# Patient Record
Sex: FEMALE | Race: WHITE | NOT HISPANIC OR LATINO | Employment: FULL TIME | URBAN - METROPOLITAN AREA
[De-identification: names, ages, dates, MRNs, and addresses within clinical notes are randomized per-mention and may not be internally consistent; named-entity substitution may affect disease eponyms.]

---

## 2017-01-25 RX ORDER — METFORMIN HYDROCHLORIDE 500 MG/1
500 TABLET ORAL 2 TIMES DAILY WITH MEALS
Qty: 60 TABLET | Refills: 0 | Status: SHIPPED | OUTPATIENT
Start: 2017-01-25 | End: 2017-02-27 | Stop reason: SDUPTHER

## 2017-02-24 DIAGNOSIS — E78.5 HYPERLIPIDEMIA LDL GOAL <100: ICD-10-CM

## 2017-02-24 RX ORDER — PRAVASTATIN SODIUM 20 MG/1
TABLET ORAL
Qty: 30 TABLET | Refills: 5 | OUTPATIENT
Start: 2017-02-24

## 2017-02-24 RX ORDER — METFORMIN HYDROCHLORIDE 500 MG/1
TABLET ORAL
Qty: 60 TABLET | Refills: 0 | OUTPATIENT
Start: 2017-02-24

## 2017-02-27 DIAGNOSIS — E78.5 HYPERLIPIDEMIA LDL GOAL <100: ICD-10-CM

## 2017-02-27 NOTE — TELEPHONE ENCOUNTER
----- Message from Jany Parker sent at 2/27/2017 10:22 AM CST -----  Contact: self  Patient [phone] (please only call the work # only)/do not call home number/ is requesting a refill on Metformin 500mg - takes one tablet twice daily and Pravastatin 20mg - takes one tab daily

## 2017-02-27 NOTE — TELEPHONE ENCOUNTER
----- Message from Jany Parker sent at 2/27/2017  2:30 PM CST -----  Contact: self  Patient [phone] is returning call to Nurse Macias/please call

## 2017-03-02 DIAGNOSIS — Z00.00 ANNUAL PHYSICAL EXAM: Primary | ICD-10-CM

## 2017-03-02 RX ORDER — METFORMIN HYDROCHLORIDE 500 MG/1
500 TABLET ORAL 2 TIMES DAILY WITH MEALS
Qty: 60 TABLET | Refills: 0 | Status: SHIPPED | OUTPATIENT
Start: 2017-03-02 | End: 2017-03-31 | Stop reason: SDUPTHER

## 2017-03-02 RX ORDER — PRAVASTATIN SODIUM 40 MG/1
40 TABLET ORAL DAILY
Qty: 30 TABLET | Refills: 0 | Status: SHIPPED | OUTPATIENT
Start: 2017-03-02 | End: 2017-03-31

## 2017-03-02 NOTE — TELEPHONE ENCOUNTER
----- Message from Carter Menjivar sent at 3/2/2017 12:34 PM CST -----  Contact: nzlz- [phone]  Patient returning the nurse phone call.Thanks!

## 2017-03-02 NOTE — TELEPHONE ENCOUNTER
Spoke to pt. Pt declined to schedule a visit as she will be coming in April for her Executive health and would like her appt at that time. Advised pt she needs to call back once appt scheduled for any further refills

## 2017-03-24 ENCOUNTER — TELEPHONE (OUTPATIENT)
Dept: OPHTHALMOLOGY | Facility: CLINIC | Age: 67
End: 2017-03-24

## 2017-03-27 ENCOUNTER — OFFICE VISIT (OUTPATIENT)
Dept: OPTOMETRY | Facility: CLINIC | Age: 67
End: 2017-03-27
Payer: COMMERCIAL

## 2017-03-27 DIAGNOSIS — H43.811 POSTERIOR VITREOUS DETACHMENT, RIGHT EYE: Primary | ICD-10-CM

## 2017-03-27 PROCEDURE — 99999 PR PBB SHADOW E&M-EST. PATIENT-LVL II: CPT | Mod: PBBFAC,,, | Performed by: OPTOMETRIST

## 2017-03-27 PROCEDURE — 92004 COMPRE OPH EXAM NEW PT 1/>: CPT | Mod: S$GLB,,, | Performed by: OPTOMETRIST

## 2017-03-27 NOTE — PATIENT INSTRUCTIONS
FLASHES / FLOATERS / POSTERIOR VITREOUS DETACHMENT    Call the clinic if you have any further changes in symptoms.  Including:  Increased numbers of floaters or flashing lights, dimness or darkness that moves through or stays constant in your vision, or any pain in the eye (s).

## 2017-03-27 NOTE — LETTER
March 27, 2017        Ector Phillips, CAITLIN  [address]  [address]  [facility] [address]             [facility]  [address]  [facility] [address]  Phone: [phone]  Fax: [phone]   Patient: Eulalia Villarreal   MR Number: [id number]   YOB: 1950   Date of Visit: 3/27/2017       Dear Dr. Phillips:    I examined Eulalia Villarreal today in clinic. Attached you will find relevant portions of my assessment and plan of care.    Acute PVD OD, she'll f/u with your office for annual visit.    If you have questions, please do not hesitate to call me. I look forward to following Eulalia Villarreal along with you.    Sincerely,      TOBY Le, OD            CC  No Recipients    Enclosure

## 2017-03-27 NOTE — PROGRESS NOTES
"HPI     Eye Problem    Additional comments: pt seeing light flashes OD x 1 week            Blurred Vision    Additional comments: "blurry line that moves across OD"           Comments   Agree above  DM 2 x 4 years  "line" in vision OD, slight blur, no pain, redness, discharge  No previous episode  Last weeks had lights in peripheral vision OD, "a lot" no only "once in a   while"         Last edited by TOBY Le, OD on 3/27/2017  9:33 AM. (History)            Assessment /Plan     For exam results, see Encounter Report.    Posterior vitreous detachment, right eye      Acute PVD OD  No evidence of RD or tear  Gave RD precautions and reviewed, knows to call if any changes.  Pt defers 4 week f/u, and will schedule with Dr Phillips for yearly visit in July                 "

## 2017-03-27 NOTE — MR AVS SNAPSHOT
[facility]  [address]  [facility] [address]  Phone: [phone]  Fax: [phone]                  Eulalia Villarreal   3/27/2017 9:00 AM   Office Visit    Description:  Female : 1950   Provider:  TOBY Le OD   Department:  [facility]           Reason for Visit     Eye Problem     Blurred Vision           Diagnoses this Visit        Comments    Posterior vitreous detachment, right eye    -  Primary            To Do List           Future Appointments        Provider Department Dept Phone    3/28/2017 7:30 AM PAMELA  HEALTH NURSE [facility] [phone]    3/28/2017 7:45 AM EKG, [facility] [phone]    3/31/2017 8:20 AM Sunny Gee MD [facility] [phone]      Goals (5 Years of Data)     None      Follow-Up and Disposition     Return in about 4 weeks (around 2017).      [facility]     [facility] -  Assistance  Registered nurses in the [facility] provide clinical advisement, health education, appointment booking, and other advisory services.  Call for this free service at [phone].             Medications           Message regarding Medications     Verify the changes and/or additions to your medication regime listed below are the same as discussed with your clinician today.  If any of these changes or additions are incorrect, please notify your healthcare provider.             Verify that the below list of medications is an accurate representation of the medications you are currently taking.  If none reported, the list may be blank. If incorrect, please contact your healthcare provider. Carry this list with you in case of emergency.           Current Medications     ascorbic acid (VITAMIN C) 500 MG tablet Take 1 tablet by mouth Daily.    b complex vitamins (B COMPLEX) capsule Take 1 capsule by mouth Daily.    metformin (GLUCOPHAGE) 500 MG  tablet Take 1 tablet (500 mg total) by mouth 2 (two) times daily with meals. Schedule a visit for further refills    pravastatin (PRAVACHOL) 40 MG tablet Take 1 tablet (40 mg total) by mouth once daily.    UBIDECARENONE (CO Q-10 ORAL) Take by mouth.    vitamin D 185 MG Tab Take 2,000 mg by mouth once daily.      VITAMIN E ACETATE (VITAMIN E ORAL) Take 1 capsule by mouth Daily.           Clinical Reference Information           Allergies as of 3/27/2017     Statins-hmg-coa Reductase Inhibitors    Meperidine    Sulfa (Sulfonamide Antibiotics)      Immunizations Administered on Date of Encounter - 3/27/2017     None      Instructions    FLASHES / FLOATERS / POSTERIOR VITREOUS DETACHMENT    Call the clinic if you have any further changes in symptoms.  Including:  Increased numbers of floaters or flashing lights, dimness or darkness that moves through or stays constant in your vision, or any pain in the eye (s).                   Language Assistance Services     ATTENTION: Language assistance services are available, free of charge. Please call [phone].      ATENCIÓN: Si earnestinela mary, tiene a desai disposición servicios gratuitos de asistencia lingüística. Llame al [phone].     CHIVO Ý: N?u b?n nói Ti?ng Vi?t, có các d?ch v? h? tr? ngôn ng? mi?n phí dành cho b?n. G?i s? [phone].         [facility] complies with applicable Federal civil rights laws and does not discriminate on the basis of race, color, national origin, age, disability, or sex.

## 2017-03-28 ENCOUNTER — CLINICAL SUPPORT (OUTPATIENT)
Dept: CARDIOLOGY | Facility: CLINIC | Age: 67
End: 2017-03-28
Payer: COMMERCIAL

## 2017-03-28 ENCOUNTER — CLINICAL SUPPORT (OUTPATIENT)
Dept: INTERNAL MEDICINE | Facility: CLINIC | Age: 67
End: 2017-03-28
Payer: COMMERCIAL

## 2017-03-28 DIAGNOSIS — Z00.00 ANNUAL PHYSICAL EXAM: Primary | ICD-10-CM

## 2017-03-28 DIAGNOSIS — Z00.00 ANNUAL PHYSICAL EXAM: ICD-10-CM

## 2017-03-28 LAB
ALBUMIN SERPL BCP-MCNC: 4 G/DL
ALP SERPL-CCNC: 79 U/L
ALT SERPL W/O P-5'-P-CCNC: 18 U/L
ANION GAP SERPL CALC-SCNC: 10 MMOL/L
AST SERPL-CCNC: 17 U/L
BILIRUB SERPL-MCNC: 1.2 MG/DL
BILIRUB UR QL STRIP: NEGATIVE
BUN SERPL-MCNC: 15 MG/DL
CALCIUM SERPL-MCNC: 9.3 MG/DL
CHLORIDE SERPL-SCNC: 103 MMOL/L
CHOLEST/HDLC SERPL: 4.5 {RATIO}
CLARITY UR: CLEAR
CO2 SERPL-SCNC: 28 MMOL/L
COLOR UR: YELLOW
CREAT SERPL-MCNC: 0.8 MG/DL
ERYTHROCYTE [DISTWIDTH] IN BLOOD BY AUTOMATED COUNT: 12.7 %
EST. GFR  (AFRICAN AMERICAN): >60 ML/MIN/1.73 M^2
EST. GFR  (NON AFRICAN AMERICAN): >60 ML/MIN/1.73 M^2
GLUCOSE SERPL-MCNC: 132 MG/DL
GLUCOSE UR QL STRIP: NEGATIVE
HCT VFR BLD AUTO: 38.8 %
HDL/CHOLESTEROL RATIO: 22.1 %
HDLC SERPL-MCNC: 235 MG/DL
HDLC SERPL-MCNC: 52 MG/DL
HGB BLD-MCNC: 13.3 G/DL
HGB UR QL STRIP: ABNORMAL
KETONES UR QL STRIP: NEGATIVE
LDLC SERPL CALC-MCNC: 146.6 MG/DL
LEUKOCYTE ESTERASE UR QL STRIP: NEGATIVE
MCH RBC QN AUTO: 30.6 PG
MCHC RBC AUTO-ENTMCNC: 34.3 %
MCV RBC AUTO: 89 FL
NITRITE UR QL STRIP: NEGATIVE
NONHDLC SERPL-MCNC: 183 MG/DL
PH UR STRIP: 6 [PH] (ref 5–8)
PLATELET # BLD AUTO: 272 K/UL
PMV BLD AUTO: 10.6 FL
POTASSIUM SERPL-SCNC: 4.5 MMOL/L
PROT SERPL-MCNC: 7.4 G/DL
PROT UR QL STRIP: NEGATIVE
RBC # BLD AUTO: 4.35 M/UL
SODIUM SERPL-SCNC: 141 MMOL/L
SP GR UR STRIP: 1.02 (ref 1–1.03)
TRIGL SERPL-MCNC: 182 MG/DL
URN SPEC COLLECT METH UR: ABNORMAL
WBC # BLD AUTO: 6.72 K/UL

## 2017-03-28 PROCEDURE — 85027 COMPLETE CBC AUTOMATED: CPT | Mod: PO

## 2017-03-28 PROCEDURE — 83036 HEMOGLOBIN GLYCOSYLATED A1C: CPT

## 2017-03-28 PROCEDURE — 81003 URINALYSIS AUTO W/O SCOPE: CPT | Mod: PO

## 2017-03-28 PROCEDURE — 80053 COMPREHEN METABOLIC PANEL: CPT | Mod: PO

## 2017-03-28 PROCEDURE — 80061 LIPID PANEL: CPT

## 2017-03-28 PROCEDURE — 93000 ELECTROCARDIOGRAM COMPLETE: CPT | Mod: S$GLB,,, | Performed by: INTERNAL MEDICINE

## 2017-03-29 LAB
ESTIMATED AVG GLUCOSE: 134 MG/DL
HBA1C MFR BLD HPLC: 6.3 %

## 2017-03-31 ENCOUNTER — OFFICE VISIT (OUTPATIENT)
Dept: FAMILY MEDICINE | Facility: CLINIC | Age: 67
End: 2017-03-31
Payer: COMMERCIAL

## 2017-03-31 VITALS
HEIGHT: 64 IN | WEIGHT: 141.13 LBS | TEMPERATURE: 98 F | SYSTOLIC BLOOD PRESSURE: 110 MMHG | BODY MASS INDEX: 24.1 KG/M2 | DIASTOLIC BLOOD PRESSURE: 70 MMHG | HEART RATE: 76 BPM

## 2017-03-31 DIAGNOSIS — E11.9 CONTROLLED TYPE 2 DIABETES MELLITUS WITHOUT COMPLICATION, WITHOUT LONG-TERM CURRENT USE OF INSULIN: ICD-10-CM

## 2017-03-31 DIAGNOSIS — Z00.00 PREVENTATIVE HEALTH CARE: Primary | ICD-10-CM

## 2017-03-31 DIAGNOSIS — E78.5 HYPERLIPIDEMIA LDL GOAL <100: ICD-10-CM

## 2017-03-31 PROCEDURE — 99999 PR PBB SHADOW E&M-EST. PATIENT-LVL III: CPT | Mod: PBBFAC,,, | Performed by: FAMILY MEDICINE

## 2017-03-31 PROCEDURE — 99397 PER PM REEVAL EST PAT 65+ YR: CPT | Mod: S$GLB,,, | Performed by: FAMILY MEDICINE

## 2017-03-31 RX ORDER — GLUCOSAMINE/D3/BOSWELLIA SERRA 1500MG-400
TABLET ORAL
COMMUNITY

## 2017-03-31 RX ORDER — ROSUVASTATIN CALCIUM 10 MG/1
10 TABLET, COATED ORAL DAILY
Qty: 30 TABLET | Refills: 11 | Status: SHIPPED | OUTPATIENT
Start: 2017-03-31 | End: 2018-05-24 | Stop reason: SDUPTHER

## 2017-03-31 RX ORDER — METFORMIN HYDROCHLORIDE 500 MG/1
500 TABLET ORAL 2 TIMES DAILY WITH MEALS
Qty: 60 TABLET | Refills: 11 | Status: SHIPPED | OUTPATIENT
Start: 2017-03-31 | End: 2018-05-03 | Stop reason: SDUPTHER

## 2017-03-31 NOTE — MR AVS SNAPSHOT
[facility]  [address]  [facility] [address]  Phone: [phone]  Fax: [phone]                  Eulalia Villarreal   3/31/2017 8:20 AM   Office Visit    Description:  Female : 1950   Provider:  Sunny Gee MD   Department:  [facility]           Reason for Visit     Executive Health           Diagnoses this Visit        Comments    Preventative health care    -  Primary     Hyperlipidemia LDL goal <100         Controlled type 2 diabetes mellitus without complication, without long-term current use of insulin                To Do List           Future Appointments        Provider Department Dept Phone    2017 10:05 AM LAB, [place] [facility] [phone]      Goals (5 Years of Data)     None      Follow-Up and Disposition     Return in about 1 year (around 3/31/2018).       These Medications        Disp Refills Start End    rosuvastatin (CRESTOR) 10 MG tablet 30 tablet 11 3/31/2017 3/31/2018    Take 1 tablet (10 mg total) by mouth once daily. - Oral    Pharmacy: [facility]/pharmacy #[id number] - [place] - [address] AT [facility] Ph #: [phone]       metformin (GLUCOPHAGE) 500 MG tablet 60 tablet 11 3/31/2017     Take 1 tablet (500 mg total) by mouth 2 (two) times daily with meals. - Oral    Pharmacy: [facility]/pharmacy #[id number] - [place], [place] - [address] AT [facility] Ph #: [phone]         [facility]     [facility] Care [facility] -  Assistance  Unless otherwise directed by your provider, please contact [facility], our nurse care line that is available for  assistance.     Registered nurses in the [facility] provide: appointment scheduling, clinical advisement, health education, and other advisory services.  Call: [phone] (toll free)               Medications           Message regarding Medications     Verify the changes and/or additions to your medication regime  listed below are the same as discussed with your clinician today.  If any of these changes or additions are incorrect, please notify your healthcare provider.        START taking these NEW medications        Refills    rosuvastatin (CRESTOR) 10 MG tablet 11    Sig: Take 1 tablet (10 mg total) by mouth once daily.    Class: Normal    Route: Oral      CHANGE how you are taking these medications     Start Taking Instead of    metformin (GLUCOPHAGE) 500 MG tablet metformin (GLUCOPHAGE) 500 MG tablet    Dosage:  Take 1 tablet (500 mg total) by mouth 2 (two) times daily with meals. Dosage:  Take 1 tablet (500 mg total) by mouth 2 (two) times daily with meals. Schedule a visit for further refills    Reason for Change:  Reorder       STOP taking these medications     pravastatin (PRAVACHOL) 40 MG tablet Take 1 tablet (40 mg total) by mouth once daily.           Verify that the below list of medications is an accurate representation of the medications you are currently taking.  If none reported, the list may be blank. If incorrect, please contact your healthcare provider. Carry this list with you in case of emergency.           Current Medications     ascorbic acid (VITAMIN C) 500 MG tablet Take 1 tablet by mouth Daily.    b complex vitamins (B COMPLEX) capsule Take 1 capsule by mouth Daily.    glucosamine-D3-Boswellia serr (OSTEO BI-FLEX, 5-LOXIN,) 1,500-400-100 mg-unit-mg Tab Take by mouth.    metformin (GLUCOPHAGE) 500 MG tablet Take 1 tablet (500 mg total) by mouth 2 (two) times daily with meals.    UBIDECARENONE (CO Q-10 ORAL) Take by mouth.    vitamin D 185 MG Tab Take 2,000 mg by mouth once daily.      VITAMIN E ACETATE (VITAMIN E ORAL) Take 1 capsule by mouth Daily.    rosuvastatin (CRESTOR) 10 MG tablet Take 1 tablet (10 mg total) by mouth once daily.           Clinical Reference Information           Your Vitals Were     BP Pulse Temp Height Weight BMI    110/70 (BP Location: Left arm, Patient Position: Sitting, BP  "Method: Manual) 76 97.9 °F (36.6 °C) (Oral) 5' 4" (1.626 m) 64 kg (141 lb 1.5 oz) 24.22 kg/m2      Blood Pressure          Most Recent Value    BP  110/70      Allergies as of 3/31/2017     Statins-hmg-coa Reductase Inhibitors    Meperidine    Sulfa (Sulfonamide Antibiotics)      Immunizations Administered on Date of Encounter - 3/31/2017     None      Orders Placed During Today's Visit     Future Labs/Procedures Expected by Expires    Comprehensive metabolic panel  6/29/2017 3/31/2018    Lipid panel  6/29/2017 3/31/2018      Language Assistance Services     ATTENTION: Language assistance services are available, free of charge. Please call [phone].      ATENCIÓN: Si dinora hernandez, tiene a desai disposición servicios gratuitos de asistencia lingüística. Llame al [phone].     CHIVO Ý: N?u b?n nói Ti?ng Vi?t, có các d?ch v? h? tr? ngôn ng? mi?n phí dành cho b?n. G?i s? [phone].         [facility] complies with applicable Federal civil rights laws and does not discriminate on the basis of race, color, national origin, age, disability, or sex.        "

## 2017-03-31 NOTE — PROGRESS NOTES
March 31, 2017                                                                                                                                                                                                                                                                                      Eulalia Villarreal  [address] [facility] [address]                                                                                                                                                                                                                                                                                                RE: Eulalia Villarreal                                                        Clinic #:[id number]                                                                                                                                   Dear  Eulalia Villarreal,                                                                                                                                           Thank you for allowing me to serve you and perform your Executive Health exam on March 31, 2017.   This letter will serve a brief summary of the history, physical findings, and laboratory/studies performed and recommendations at that time.        Can only tolerate pravastatin 20mg daily                                                                                   REASON FOR VISIT: Executive Health Preventive Physical Examination    Past Medical History:   Diagnosis Date    Arthritis     hands    Diabetes mellitus     Herniated disc     L5    Hyperlipidemia        Past Surgical History:   Procedure Laterality Date    ADENOIDECTOMY      BACK SURGERY      discectomy    FLEXIBLE SIGMOIDOSCOPY  8/7/1990  [facility]    TONSILLECTOMY      WISDOM TOOTH EXTRACTION         Family History   Problem Relation Age of Onset    Diabetes Mother     Coronary artery disease Mother     Diabetes Son     Cancer Maternal Aunt      Cerebral aneurysm Father     Coronary artery disease Sister     Coronary artery disease Brother     Heart disease Neg Hx        Social History     Social History    Marital status:      Spouse name: N/A     Occupational History          Social History Main Topics    Smoking status: Never Smoker    Smokeless tobacco: Never Used    Alcohol use No      Comment: rare     Allergies: Statins-hmg-coa reductase inhibitors; Meperidine; and Sulfa (sulfonamide antibiotics)    Current Outpatient Prescriptions   Medication Sig Dispense Refill    ascorbic acid (VITAMIN C) 500 MG tablet Take 1 tablet by mouth Daily.      b complex vitamins (B COMPLEX) capsule Take 1 capsule by mouth Daily.      metformin (GLUCOPHAGE) 500 MG tablet TAKE 1 TABLET (500 MG TOTAL) BY MOUTH 2 (TWO) TIMES DAILY WITH MEALS. 60 tablet 5    pravastatin (PRAVACHOL) 20 MG tablet Take 1 tablet (20 mg total) by mouth once daily. 90 tablet 3    UBIDECARENONE (CO Q-10 ORAL) Take by mouth.      vitamin D 185 MG Tab Take 2,000 mg by mouth once daily.        VITAMIN E ACETATE (VITAMIN E ORAL) Take 1 capsule by mouth Daily.       No current facility-administered medications for this visit.        REVIEW OF SYSTEMS:  No recent changes in weight, or complaints of fatigue. No recent changes in vision, or hearing. Denies frequent headaches.No recent changes in voice. No new or changing skin lesions. Denies abnormal bruising or bleeding.  Denies chest pain or sensation of skipped beats. No new onset of shortness of breath, or dyspnea on exertion. Denies abdominal discomfort, constipation, diarrhea,or blood in stool. Denies difficulty with urination.   No recent joint swelling or muscle discomfort. Denies pain or weakness in extremeties. No recent loss of balance. Denies problems with sleep or depression.        Remainder of the review of systems without pertinent positves at this time.                                                   "                            PHYSICAL EXAM:   VITAL SIGNS:   /70 (BP Location: Left arm, Patient Position: Sitting, BP Method: Manual)  Pulse 76  Temp 97.9 °F (36.6 °C) (Oral)   Ht 5' 4" (1.626 m)  Wt 64 kg (141 lb 1.5 oz)  BMI 24.22 kg/m2  GENERAL APPEARANCE:  Well nourished and normally developed,  pleasant 66 y.o. female, in good spirits.  SKIN: Without rashes or overt lesions.  HEENT: Head normacephalic. There was no scleral icterus. Mucous membranes were moist. Dentition. Neck is supple, no thyromegally, or carotid bruits.  LUNGS: Clear to auscultation bilaterally. Normal respiratory effort.  HEART: Exam reveals regular rate and rhythm. First and second heart sounds normal. No murmurs, rubs or gallops.   ABDOMEN: Soft, non-tender, non-distended. Exam reveals normal bowl sounds, no masses, no organomegaly and no aortic enlargement.    EXTREMITIES:  Nonedematous, both femoral and pedal pulses are normal. No joint stiffness or tenderness. Full range of motion and strength, upper and lower bilaterally.  Foot exam: inspection normal, sensation intact; 2+ DP pulses bilaterally    LAB DATA/STUDIES REVIEWED:  LABS: reviewed  Urine: normal  EKG: normal    ASSESSMENT/RECOMMENDATIONS :    At this time,  you appear to be in good medical condition.    Continue your present medications  Continue to work on regular exercise, maintenance of a healthy weight, balanced diet rich in fruits/vegetables and lean protein, and avoidance of unhealthy habits like smoking and excessive alcohol intake.  Switch to Crestor 10mg daily. Recheck labs in 3 months  I look forward to seeing you again next year.    Please contact me should you have any questions or concerns regarding physical findings, or my recommendations.       Sincerely yours,    Sunny Gee M.D.  Department of Family Practice  [facility]  "

## 2017-04-03 DIAGNOSIS — E78.5 HYPERLIPIDEMIA LDL GOAL <100: ICD-10-CM

## 2017-04-03 NOTE — TELEPHONE ENCOUNTER
Check which statin she is on; i understand she does executive health but as her pcp i am responsible for her readings such as uncontrolled ldl; recommend f/u with me in next 3 months

## 2017-04-10 RX ORDER — PRAVASTATIN SODIUM 40 MG/1
TABLET ORAL
Qty: 30 TABLET | Refills: 0 | OUTPATIENT
Start: 2017-04-10

## 2017-04-10 RX ORDER — METFORMIN HYDROCHLORIDE 500 MG/1
TABLET ORAL
Qty: 60 TABLET | Refills: 0 | Status: SHIPPED | OUTPATIENT
Start: 2017-04-10 | End: 2018-05-02 | Stop reason: SDUPTHER

## 2017-04-10 NOTE — TELEPHONE ENCOUNTER
"Left message asking patient call back.   Patient states Dr. Gee changed her to Crestor because she "couldn't take the Pravastatin 40mg"   "

## 2017-06-26 ENCOUNTER — PATIENT OUTREACH (OUTPATIENT)
Dept: ADMINISTRATIVE | Facility: HOSPITAL | Age: 67
End: 2017-06-26

## 2017-07-03 ENCOUNTER — TELEPHONE (OUTPATIENT)
Dept: FAMILY MEDICINE | Facility: CLINIC | Age: 67
End: 2017-07-03

## 2017-07-03 NOTE — TELEPHONE ENCOUNTER
----- Message from Carter Menjivar sent at 7/3/2017 11:31 AM CDT -----  Contact: self   [phone]  Patient called asking if the doctor is ordering labs to check diabetes and cholesterol. Thanks!

## 2017-07-06 ENCOUNTER — LAB VISIT (OUTPATIENT)
Dept: LAB | Facility: HOSPITAL | Age: 67
End: 2017-07-06
Attending: FAMILY MEDICINE
Payer: COMMERCIAL

## 2017-07-06 DIAGNOSIS — E78.5 HYPERLIPIDEMIA LDL GOAL <100: ICD-10-CM

## 2017-07-06 LAB
ALBUMIN SERPL BCP-MCNC: 3.9 G/DL
ALP SERPL-CCNC: 85 U/L
ALT SERPL W/O P-5'-P-CCNC: 17 U/L
ANION GAP SERPL CALC-SCNC: 5 MMOL/L
AST SERPL-CCNC: 21 U/L
BILIRUB SERPL-MCNC: 1.3 MG/DL
BUN SERPL-MCNC: 17 MG/DL
CALCIUM SERPL-MCNC: 9.7 MG/DL
CHLORIDE SERPL-SCNC: 103 MMOL/L
CHOLEST/HDLC SERPL: 3.3 {RATIO}
CO2 SERPL-SCNC: 32 MMOL/L
CREAT SERPL-MCNC: 0.8 MG/DL
EST. GFR  (AFRICAN AMERICAN): >60 ML/MIN/1.73 M^2
EST. GFR  (NON AFRICAN AMERICAN): >60 ML/MIN/1.73 M^2
GLUCOSE SERPL-MCNC: 125 MG/DL
HDL/CHOLESTEROL RATIO: 30.1 %
HDLC SERPL-MCNC: 183 MG/DL
HDLC SERPL-MCNC: 55 MG/DL
LDLC SERPL CALC-MCNC: 101.2 MG/DL
NONHDLC SERPL-MCNC: 128 MG/DL
POTASSIUM SERPL-SCNC: 4.9 MMOL/L
PROT SERPL-MCNC: 7.5 G/DL
SODIUM SERPL-SCNC: 140 MMOL/L
TRIGL SERPL-MCNC: 134 MG/DL

## 2017-07-06 PROCEDURE — 36415 COLL VENOUS BLD VENIPUNCTURE: CPT | Mod: PO

## 2017-07-06 PROCEDURE — 80053 COMPREHEN METABOLIC PANEL: CPT

## 2017-07-06 PROCEDURE — 80061 LIPID PANEL: CPT

## 2018-03-28 DIAGNOSIS — E78.5 HYPERLIPIDEMIA LDL GOAL <100: ICD-10-CM

## 2018-03-28 DIAGNOSIS — E11.8 CONTROLLED TYPE 2 DIABETES MELLITUS WITH COMPLICATION, WITHOUT LONG-TERM CURRENT USE OF INSULIN: Primary | ICD-10-CM

## 2018-03-29 NOTE — PROGRESS NOTES
Refill Authorization Note     is requesting a refill authorization.    Brief assessment and rationale for refill: DEFER: needs annual; hasn't been seen since 2015 and labs  Amount/Quantity of medication ordered: 90d        Refills Authorized: Yes  If authorized number of refills: 0        Medication-related problems identified:   Requires appointment  Requires labs  Medication Therapy Plan: Will order a1c; graham 3 more; also needs annual  Name and strength of medication: ROSUVASTATIN CALCIUM 10 MG TAB  How patient will take medication: t1t po daily   Medication reconciliation completed: No    Lab Results   Component Value Date    CHOL 183 07/06/2017    CHOL 235 (H) 03/28/2017    CHOL 248 (H) 07/22/2016     Lab Results   Component Value Date    HDL 55 07/06/2017    HDL 52 03/28/2017    HDL 53 07/22/2016     Lab Results   Component Value Date    LDLCALC 101.2 07/06/2017    LDLCALC 146.6 03/28/2017    LDLCALC 164.0 (H) 07/22/2016     Lab Results   Component Value Date    TRIG 134 07/06/2017    TRIG 182 (H) 03/28/2017    TRIG 155 (H) 07/22/2016     Lab Results   Component Value Date    CHOLHDL 30.1 07/06/2017    CHOLHDL 22.1 03/28/2017    CHOLHDL 21.4 07/22/2016

## 2018-04-02 RX ORDER — ROSUVASTATIN CALCIUM 10 MG/1
10 TABLET, COATED ORAL DAILY
Qty: 90 TABLET | Refills: 0 | OUTPATIENT
Start: 2018-04-02 | End: 2019-04-02

## 2018-04-23 DIAGNOSIS — Z00.00 ANNUAL PHYSICAL EXAM: Primary | ICD-10-CM

## 2018-04-23 DIAGNOSIS — Z12.39 SCREENING BREAST EXAMINATION: ICD-10-CM

## 2018-05-02 DIAGNOSIS — E11.9 CONTROLLED TYPE 2 DIABETES MELLITUS WITHOUT COMPLICATION, WITHOUT LONG-TERM CURRENT USE OF INSULIN: ICD-10-CM

## 2018-05-02 RX ORDER — METFORMIN HYDROCHLORIDE 500 MG/1
500 TABLET ORAL 2 TIMES DAILY WITH MEALS
Qty: 180 TABLET | Refills: 0 | OUTPATIENT
Start: 2018-05-02

## 2018-05-02 NOTE — PROGRESS NOTES
Refill Authorization Note     is requesting a refill authorization.    Brief assessment and rationale for refill: DEFER: unclear who is PCP; needs labs (AREX)   Amount/Quantity of medication ordered: 90d         Refills Authorized: Yes  If authorized number of refills: 0        Medication-related problems identified: Requires labs  Medication Therapy Plan: looks like he will follow with Dr. Gee soon but he routed to your staff; defer; needs labs  Name and strength of medication: METFORMIN  MG TABLET  How patient will take medication: t1t po BID   Medication reconciliation completed: No  Comments:   Lab Results   Component Value Date    HGBA1C 6.3 (H) 03/28/2017      Lab Results   Component Value Date    CREATININE 0.8 07/06/2017    BUN 17 07/06/2017     07/06/2017    K 4.9 07/06/2017     07/06/2017    CO2 32 (H) 07/06/2017

## 2018-05-03 RX ORDER — METFORMIN HYDROCHLORIDE 500 MG/1
500 TABLET ORAL 2 TIMES DAILY WITH MEALS
Qty: 60 TABLET | Refills: 11 | Status: SHIPPED | OUTPATIENT
Start: 2018-05-03 | End: 2019-05-08 | Stop reason: SDUPTHER

## 2018-05-21 ENCOUNTER — HOSPITAL ENCOUNTER (OUTPATIENT)
Dept: RADIOLOGY | Facility: HOSPITAL | Age: 68
Discharge: HOME | End: 2018-05-21
Attending: FAMILY MEDICINE
Payer: COMMERCIAL

## 2018-05-21 ENCOUNTER — CLINICAL SUPPORT (OUTPATIENT)
Dept: INTERNAL MEDICINE | Facility: CLINIC | Age: 68
End: 2018-05-21
Attending: FAMILY MEDICINE
Payer: COMMERCIAL

## 2018-05-21 ENCOUNTER — CLINICAL SUPPORT (OUTPATIENT)
Dept: CARDIOLOGY | Facility: CLINIC | Age: 68
End: 2018-05-21
Payer: COMMERCIAL

## 2018-05-21 VITALS — HEIGHT: 64 IN | BODY MASS INDEX: 24.07 KG/M2 | WEIGHT: 141 LBS

## 2018-05-21 DIAGNOSIS — Z00.00 ANNUAL PHYSICAL EXAM: Primary | ICD-10-CM

## 2018-05-21 DIAGNOSIS — Z12.39 SCREENING BREAST EXAMINATION: ICD-10-CM

## 2018-05-21 DIAGNOSIS — Z00.00 ANNUAL PHYSICAL EXAM: ICD-10-CM

## 2018-05-21 LAB
ALBUMIN SERPL BCP-MCNC: 4.1 G/DL
ALP SERPL-CCNC: 76 U/L
ALT SERPL W/O P-5'-P-CCNC: 15 U/L
ANION GAP SERPL CALC-SCNC: 13 MMOL/L
AST SERPL-CCNC: 18 U/L
BILIRUB SERPL-MCNC: 1.4 MG/DL
BILIRUB UR QL STRIP: NEGATIVE
BUN SERPL-MCNC: 19 MG/DL
CALCIUM SERPL-MCNC: 9.8 MG/DL
CHLORIDE SERPL-SCNC: 108 MMOL/L
CHOLEST SERPL-MCNC: 178 MG/DL
CHOLEST/HDLC SERPL: 3.1 {RATIO}
CLARITY UR: ABNORMAL
CO2 SERPL-SCNC: 27 MMOL/L
COLOR UR: YELLOW
CREAT SERPL-MCNC: 0.8 MG/DL
ERYTHROCYTE [DISTWIDTH] IN BLOOD BY AUTOMATED COUNT: 13 %
EST. GFR  (AFRICAN AMERICAN): >60 ML/MIN/1.73 M^2
EST. GFR  (NON AFRICAN AMERICAN): >60 ML/MIN/1.73 M^2
GLUCOSE SERPL-MCNC: 118 MG/DL
GLUCOSE UR QL STRIP: NEGATIVE
HCT VFR BLD AUTO: 40.8 %
HDLC SERPL-MCNC: 57 MG/DL
HDLC SERPL: 32 %
HGB BLD-MCNC: 13.6 G/DL
HGB UR QL STRIP: ABNORMAL
KETONES UR QL STRIP: NEGATIVE
LDLC SERPL CALC-MCNC: 99 MG/DL
LEUKOCYTE ESTERASE UR QL STRIP: NEGATIVE
MCH RBC QN AUTO: 30.2 PG
MCHC RBC AUTO-ENTMCNC: 33.3 G/DL
MCV RBC AUTO: 91 FL
NITRITE UR QL STRIP: NEGATIVE
NONHDLC SERPL-MCNC: 121 MG/DL
PH UR STRIP: 5 [PH] (ref 5–8)
PLATELET # BLD AUTO: 261 K/UL
PMV BLD AUTO: 11.3 FL
POTASSIUM SERPL-SCNC: 5.4 MMOL/L
PROT SERPL-MCNC: 7.4 G/DL
PROT UR QL STRIP: NEGATIVE
RBC # BLD AUTO: 4.51 M/UL
SODIUM SERPL-SCNC: 148 MMOL/L
SP GR UR STRIP: >=1.03 (ref 1–1.03)
TRIGL SERPL-MCNC: 110 MG/DL
URN SPEC COLLECT METH UR: ABNORMAL
WBC # BLD AUTO: 7.07 K/UL

## 2018-05-21 PROCEDURE — 77067 SCR MAMMO BI INCL CAD: CPT | Mod: 26,,, | Performed by: RADIOLOGY

## 2018-05-21 PROCEDURE — 36415 COLL VENOUS BLD VENIPUNCTURE: CPT | Mod: PO

## 2018-05-21 PROCEDURE — 85027 COMPLETE CBC AUTOMATED: CPT | Mod: PO

## 2018-05-21 PROCEDURE — 93000 ELECTROCARDIOGRAM COMPLETE: CPT | Mod: S$GLB,,, | Performed by: INTERNAL MEDICINE

## 2018-05-21 PROCEDURE — 77080 DXA BONE DENSITY AXIAL: CPT | Mod: TC,PO

## 2018-05-21 PROCEDURE — 77063 BREAST TOMOSYNTHESIS BI: CPT | Mod: 26,,, | Performed by: RADIOLOGY

## 2018-05-21 PROCEDURE — 80061 LIPID PANEL: CPT

## 2018-05-21 PROCEDURE — 81003 URINALYSIS AUTO W/O SCOPE: CPT | Mod: PO

## 2018-05-21 PROCEDURE — 77067 SCR MAMMO BI INCL CAD: CPT | Mod: TC,PO

## 2018-05-21 PROCEDURE — 80053 COMPREHEN METABOLIC PANEL: CPT | Mod: PO

## 2018-05-21 PROCEDURE — 77080 DXA BONE DENSITY AXIAL: CPT | Mod: 26,,, | Performed by: RADIOLOGY

## 2018-05-24 ENCOUNTER — OFFICE VISIT (OUTPATIENT)
Dept: FAMILY MEDICINE | Facility: CLINIC | Age: 68
End: 2018-05-24
Payer: COMMERCIAL

## 2018-05-24 VITALS
OXYGEN SATURATION: 98 % | HEART RATE: 57 BPM | RESPIRATION RATE: 18 BRPM | BODY MASS INDEX: 23.45 KG/M2 | HEIGHT: 64 IN | WEIGHT: 137.38 LBS | SYSTOLIC BLOOD PRESSURE: 110 MMHG | DIASTOLIC BLOOD PRESSURE: 70 MMHG

## 2018-05-24 DIAGNOSIS — Z00.00 PREVENTATIVE HEALTH CARE: Primary | ICD-10-CM

## 2018-05-24 DIAGNOSIS — E78.5 HYPERLIPIDEMIA LDL GOAL <100: ICD-10-CM

## 2018-05-24 DIAGNOSIS — E11.9 CONTROLLED TYPE 2 DIABETES MELLITUS WITHOUT COMPLICATION, WITHOUT LONG-TERM CURRENT USE OF INSULIN: ICD-10-CM

## 2018-05-24 PROCEDURE — 99999 PR PBB SHADOW E&M-EST. PATIENT-LVL III: CPT | Mod: PBBFAC,,, | Performed by: FAMILY MEDICINE

## 2018-05-24 PROCEDURE — 90732 PPSV23 VACC 2 YRS+ SUBQ/IM: CPT | Mod: S$GLB,,, | Performed by: FAMILY MEDICINE

## 2018-05-24 PROCEDURE — 90471 IMMUNIZATION ADMIN: CPT | Mod: S$GLB,,, | Performed by: FAMILY MEDICINE

## 2018-05-24 PROCEDURE — 99397 PER PM REEVAL EST PAT 65+ YR: CPT | Mod: 25,S$GLB,, | Performed by: FAMILY MEDICINE

## 2018-05-24 RX ORDER — ROSUVASTATIN CALCIUM 10 MG/1
10 TABLET, COATED ORAL DAILY
Qty: 30 TABLET | Refills: 11 | Status: SHIPPED | OUTPATIENT
Start: 2018-05-24 | End: 2019-05-29 | Stop reason: SDUPTHER

## 2018-05-24 NOTE — PROGRESS NOTES
May 24, 2018                                                                                                                                                                                                                                                                                      Eulalia Villarreal  [address] [facility] [address]                                                                                                                                                                                                                                                                                                RE: Eulalia Villarreal                                                        Clinic #:[id number]                                                                                                                                   Dear  Eulalia Villarreal,                                                                                                                                           Thank you for allowing me to serve you and perform your Executive Health exam on May 24, 2018.   This letter will serve a brief summary of the history, physical findings, and laboratory/studies performed and recommendations at that time.                                                                                     REASON FOR VISIT: Executive Health Preventive Physical Examination    Past Medical History:   Diagnosis Date    Arthritis     hands    Diabetes mellitus     Herniated disc     L5    Hyperlipidemia        Past Surgical History:   Procedure Laterality Date    ADENOIDECTOMY      BACK SURGERY      discectomy    [facility]7/[facility]    TONSILLECTOMY      WISDOM TOOTH EXTRACTION         Family History   Problem Relation Age of Onset    Diabetes Mother     Coronary artery disease Mother     Diabetes Son     Cancer Maternal Aunt     Cerebral aneurysm Father     Coronary artery  disease Sister     Coronary artery disease Brother     Heart disease Neg Hx        Social History     Social History    Marital status:      Spouse name: N/A     Occupational History          Social History Main Topics    Smoking status: Never Smoker    Smokeless tobacco: Never Used    Alcohol use No      Comment: rare     Allergies: Statins-hmg-coa reductase inhibitors; Meperidine; and Sulfa (sulfonamide antibiotics)    Current Outpatient Prescriptions on File Prior to Visit   Medication Sig Dispense Refill    ascorbic acid (VITAMIN C) 500 MG tablet Take 1 tablet by mouth Daily.      glucosamine-D3-Boswellia serr (OSTEO BI-FLEX, 5-LOXIN,) 1,500-400-100 mg-unit-mg Tab Take by mouth.      metFORMIN (GLUCOPHAGE) 500 MG tablet Take 1 tablet (500 mg total) by mouth 2 (two) times daily with meals. 60 tablet 11    rosuvastatin (CRESTOR) 10 MG tablet Take 1 tablet (10 mg total) by mouth once daily. 30 tablet 11    UBIDECARENONE (CO Q-10 ORAL) Take by mouth.      vitamin D 185 MG Tab Take 2,000 mg by mouth once daily.        VITAMIN E ACETATE (VITAMIN E ORAL) Take 1 capsule by mouth Daily.       No current facility-administered medications on file prior to visit.    ]    REVIEW OF SYSTEMS:  No recent changes in weight, or complaints of fatigue. No recent changes in vision, or hearing. Denies frequent headaches.No recent changes in voice. No new or changing skin lesions. Denies abnormal bruising or bleeding.  Denies chest pain or sensation of skipped beats. No new onset of shortness of breath, or dyspnea on exertion. Denies abdominal discomfort, constipation, diarrhea,or blood in stool. Denies difficulty with urination.   No recent joint swelling or muscle discomfort. Denies pain or weakness in extremeties. No recent loss of balance. Denies problems with sleep or depression.        Remainder of the review of systems without pertinent positves at this time.  Some bilateral hip pain.            "                                                                   PHYSICAL EXAM:   VITAL SIGNS:   /70   Pulse (!) 57   Resp 18   Ht 5' 4" (1.626 m)   Wt 62.3 kg (137 lb 5.6 oz)   SpO2 98%   BMI 23.58 kg/m²   GENERAL APPEARANCE:  Well nourished and normally developed,  pleasant 67 y.o. female, in good spirits.  SKIN: Without rashes or overt lesions.  HEENT: Head normacephalic. There was no scleral icterus. Mucous membranes were moist. Dentition. Neck is supple, no thyromegally, or carotid bruits.  LUNGS: Clear to auscultation bilaterally. Normal respiratory effort.  HEART: Exam reveals regular rate and rhythm. First and second heart sounds normal. No murmurs, rubs or gallops.   ABDOMEN: Soft, non-tender, non-distended. Exam reveals normal bowl sounds, no masses, no organomegaly and no aortic enlargement.    EXTREMITIES:  Nonedematous, both femoral and pedal pulses are normal. No joint stiffness or tenderness. Full range of motion and strength, upper and lower bilaterally.  Foot exam: inspection normal, sensation intact; 2+ DP pulses bilaterally    LAB DATA/STUDIES REVIEWED:  LABS: reviewed  Urine: normal  EKG: normal  Bone density: osteopenia  Mammogram: normal    ASSESSMENT/RECOMMENDATIONS :    At this time,  you appear to be in good medical condition.    Pneumonia vaccine  Continue your present medications  Continue to work on regular exercise, maintenance of a healthy weight, balanced diet rich in fruits/vegetables and lean protein, and avoidance of unhealthy habits like smoking and excessive alcohol intake.  I look forward to seeing you again in 6 months with the labwork  Please contact me should you have any questions or concerns regarding physical findings, or my recommendations.       Sincerely yours,    Sunny Gee M.D.  Department of Family Practice  [facility]  "

## 2018-06-09 ENCOUNTER — OFFICE VISIT (OUTPATIENT)
Dept: URGENT CARE | Facility: CLINIC | Age: 68
End: 2018-06-09
Payer: COMMERCIAL

## 2018-06-09 VITALS
RESPIRATION RATE: 16 BRPM | OXYGEN SATURATION: 99 % | TEMPERATURE: 98 F | WEIGHT: 137 LBS | SYSTOLIC BLOOD PRESSURE: 163 MMHG | HEIGHT: 64 IN | DIASTOLIC BLOOD PRESSURE: 81 MMHG | BODY MASS INDEX: 23.39 KG/M2
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
## 2018-06-09 DIAGNOSIS — E11.9 TYPE 2 DIABETES MELLITUS WITHOUT COMPLICATION, WITHOUT LONG-TERM CURRENT USE OF INSULIN: ICD-10-CM

## 2018-06-09 DIAGNOSIS — R03.0 ELEVATED BLOOD PRESSURE READING: ICD-10-CM

## 2018-06-09 PROCEDURE — 99214 OFFICE O/P EST MOD 30 MIN: CPT | Mod: 25,S$GLB,, | Performed by: FAMILY MEDICINE

## 2018-06-09 PROCEDURE — 96372 THER/PROPH/DIAG INJ SC/IM: CPT | Mod: S$GLB,,, | Performed by: FAMILY MEDICINE

## 2018-06-09 RX ORDER — DEXAMETHASONE SODIUM PHOSPHATE 100 MG/10ML
10 INJECTION INTRAMUSCULAR; INTRAVENOUS
Status: COMPLETED | OUTPATIENT
Start: 2018-06-09 | End: 2018-06-09

## 2018-06-09 RX ADMIN — DEXAMETHASONE SODIUM PHOSPHATE 10 MG: 100 INJECTION INTRAMUSCULAR; INTRAVENOUS at 11:06

## 2018-06-09 NOTE — PROGRESS NOTES
"Subjective:       Patient ID: Eulalia Villarreal is a 67 y.o. female.    Vitals:  height is 5' 4" (1.626 m) and weight is 62.1 kg (137 lb). Her temperature is 97.9 °F (36.6 °C). Her blood pressure is 163/81 (abnormal). Her respiration is 16 and oxygen saturation is 99%.     Chief Complaint: Insect Bite    Pt was picking blackberries and was bit by a bug, on left hand not sure what type. Left hand is swollen and itchy. Had been using OTC benadryl and cortisone cream.       Insect Bite   This is a new problem. The current episode started in the past 7 days. The problem has been gradually worsening.     Review of Systems   Eyes: Negative for blurred vision.   Respiratory: Negative for shortness of breath.    Skin: Positive for color change and itching.   Musculoskeletal: Negative for back pain and joint pain.   Gastrointestinal: Negative for diarrhea.   Psychiatric/Behavioral: The patient is not nervous/anxious.        Objective:      Physical Exam   Constitutional: She appears well-developed and well-nourished.   HENT:   Head: Normocephalic and atraumatic.   Skin:   There is visible swelling and mild erythema of left dorsal hand that is not warm to touch. Good ROM of digits.   Psychiatric: She has a normal mood and affect. Her behavior is normal.   Nursing note and vitals reviewed.      Assessment:       1. Insect bite, initial encounter    2. Type 2 diabetes mellitus without complication, without long-term current use of insulin    3. Elevated blood pressure reading        Plan:         Insect bite, initial encounter  -     dexamethasone injection 10 mg; Inject 1 mL (10 mg total) into the muscle one time.        -     Zyrtec PRN    Type 2 diabetes mellitus without complication, without long-term current use of insulin        -     FBG averages < 120 and last A1C was 6.3    Elevated blood pressure reading         -    Monitor BP at home and notify PCP if readings > 140/90      "

## 2018-06-12 ENCOUNTER — TELEPHONE (OUTPATIENT)
Dept: URGENT CARE | Facility: CLINIC | Age: 68
End: 2018-06-12

## 2018-06-18 ENCOUNTER — PATIENT OUTREACH (OUTPATIENT)
Dept: ADMINISTRATIVE | Facility: HOSPITAL | Age: 68
End: 2018-06-18

## 2018-06-18 NOTE — PROGRESS NOTES
[facility] scanned into media.      Eye exam

## 2018-11-12 ENCOUNTER — PATIENT OUTREACH (OUTPATIENT)
Dept: ADMINISTRATIVE | Facility: HOSPITAL | Age: 68
End: 2018-11-12

## 2018-11-12 NOTE — LETTER
November 12, 2018    Eulalia Villarreal  [address]  [facility] [id number]             [facility]  [address]  [facility] [address]  Phone: [phone] Dear Ms. Villarreal:    [facility] is committed to your overall health.  To help you get the most out of each of your visits, we will review your information to make sure you are up to date on all of your recommended tests and/or procedures.      Sunny Gee MD    has found that your chart shows you may be due for the following:    Influenza Vaccine    REMINDER: lab appointment 11/16/18    If you have had any of the above done at another facility, please bring the records or information with you so that your record at [facility] will be complete.  If you would like to schedule any of these, please contact me.    If you are currently taking medication, please bring it with you to your appointment for review.    Sincerely,    Nuris Irwin  Clinical Care Coordinator  [facility] [address] [place] [address]  Phone: [phone]   Fax: [phone]

## 2018-11-13 NOTE — PROGRESS NOTES
Health Maintenance Due   Topic Date Due    Urine Microalbumin  07/22/2017    Hemoglobin A1c  09/28/2017    Influenza Vaccine  08/01/2018     Pre-visit outreach via mail

## 2018-11-15 ENCOUNTER — TELEPHONE (OUTPATIENT)
Dept: FAMILY MEDICINE | Facility: CLINIC | Age: 68
End: 2018-11-15

## 2018-11-15 NOTE — TELEPHONE ENCOUNTER
----- Message from Sarkis Sheppard sent at 11/15/2018  8:42 AM CST -----  Contact: Pt   States she's calling rg labs and 's appt on the 26th and wants to discuss that / had to go to the dentist and have been on steriods and may affect her labs & can be reached at [phone]

## 2018-11-15 NOTE — TELEPHONE ENCOUNTER
Get labs as scheduled.  Steroids have not been in her system long enough to dramatically affect the test.  If she waits a few weeks, the glucose readings will go up due to the steroids.

## 2018-11-16 ENCOUNTER — TELEPHONE (OUTPATIENT)
Dept: FAMILY MEDICINE | Facility: CLINIC | Age: 68
End: 2018-11-16

## 2018-11-16 ENCOUNTER — LAB VISIT (OUTPATIENT)
Dept: LAB | Facility: HOSPITAL | Age: 68
End: 2018-11-16
Attending: FAMILY MEDICINE
Payer: COMMERCIAL

## 2018-11-16 DIAGNOSIS — E11.9 CONTROLLED TYPE 2 DIABETES MELLITUS WITHOUT COMPLICATION, WITHOUT LONG-TERM CURRENT USE OF INSULIN: ICD-10-CM

## 2018-11-16 DIAGNOSIS — E78.5 HYPERLIPIDEMIA, UNSPECIFIED HYPERLIPIDEMIA TYPE: Primary | ICD-10-CM

## 2018-11-16 DIAGNOSIS — E78.5 HYPERLIPIDEMIA, UNSPECIFIED HYPERLIPIDEMIA TYPE: ICD-10-CM

## 2018-11-16 LAB
ALBUMIN SERPL BCP-MCNC: 4.4 G/DL
ALP SERPL-CCNC: 91 U/L
ALT SERPL W/O P-5'-P-CCNC: 19 U/L
ANION GAP SERPL CALC-SCNC: 12 MMOL/L
AST SERPL-CCNC: 22 U/L
BILIRUB SERPL-MCNC: 1.4 MG/DL
BUN SERPL-MCNC: 16 MG/DL
CALCIUM SERPL-MCNC: 9.7 MG/DL
CHLORIDE SERPL-SCNC: 102 MMOL/L
CHOLEST SERPL-MCNC: 191 MG/DL
CHOLEST/HDLC SERPL: 2.9 {RATIO}
CO2 SERPL-SCNC: 27 MMOL/L
CREAT SERPL-MCNC: 0.8 MG/DL
EST. GFR  (AFRICAN AMERICAN): >60 ML/MIN/1.73 M^2
EST. GFR  (NON AFRICAN AMERICAN): >60 ML/MIN/1.73 M^2
ESTIMATED AVG GLUCOSE: 128 MG/DL
GLUCOSE SERPL-MCNC: 141 MG/DL
HBA1C MFR BLD HPLC: 6.1 %
HDLC SERPL-MCNC: 66 MG/DL
HDLC SERPL: 34.6 %
LDLC SERPL CALC-MCNC: 106.6 MG/DL
NONHDLC SERPL-MCNC: 125 MG/DL
POTASSIUM SERPL-SCNC: 4 MMOL/L
PROT SERPL-MCNC: 8.2 G/DL
SODIUM SERPL-SCNC: 141 MMOL/L
TRIGL SERPL-MCNC: 92 MG/DL

## 2018-11-16 PROCEDURE — 83036 HEMOGLOBIN GLYCOSYLATED A1C: CPT

## 2018-11-16 PROCEDURE — 36415 COLL VENOUS BLD VENIPUNCTURE: CPT | Mod: PO

## 2018-11-16 PROCEDURE — 80061 LIPID PANEL: CPT

## 2018-11-16 PROCEDURE — 80053 COMPREHEN METABOLIC PANEL: CPT

## 2018-11-16 NOTE — TELEPHONE ENCOUNTER
----- Message from Franny Donovan sent at 11/16/2018  7:20 AM CST -----  Contact: self  Patient had lab work done this morning, however a lipid was left off. Please add this to the orders as the lab took an extra tube of blood. Also, check and make sure there is nothing else you need to order.  Please call back at    Thanks!

## 2018-11-26 ENCOUNTER — OFFICE VISIT (OUTPATIENT)
Dept: FAMILY MEDICINE | Facility: CLINIC | Age: 68
End: 2018-11-26
Payer: COMMERCIAL

## 2018-11-26 VITALS
OXYGEN SATURATION: 98 % | SYSTOLIC BLOOD PRESSURE: 118 MMHG | BODY MASS INDEX: 24.05 KG/M2 | RESPIRATION RATE: 18 BRPM | DIASTOLIC BLOOD PRESSURE: 70 MMHG | HEART RATE: 82 BPM | WEIGHT: 140.88 LBS | HEIGHT: 64 IN

## 2018-11-26 DIAGNOSIS — E11.9 CONTROLLED TYPE 2 DIABETES MELLITUS WITHOUT COMPLICATION, WITHOUT LONG-TERM CURRENT USE OF INSULIN: Primary | ICD-10-CM

## 2018-11-26 DIAGNOSIS — E78.5 HYPERLIPIDEMIA LDL GOAL <100: ICD-10-CM

## 2018-11-26 PROCEDURE — 99213 OFFICE O/P EST LOW 20 MIN: CPT | Mod: S$GLB,,, | Performed by: FAMILY MEDICINE

## 2018-11-26 PROCEDURE — 99999 PR PBB SHADOW E&M-EST. PATIENT-LVL III: CPT | Mod: PBBFAC,,, | Performed by: FAMILY MEDICINE

## 2018-11-26 NOTE — PROGRESS NOTES
Subjective:       Patient ID: Eulalia Villarreal is a 68 y.o. female.    Chief Complaint: Follow-up    Here for 6 month f/u on diabetes    DM2 - taking metformin 500mg BID  HLD - taking Crestor 10mg daily with CoQ 10          Past Medical History:   Diagnosis Date    Arthritis     hands    Diabetes mellitus     Herniated disc     L5    Hyperlipidemia        Past Surgical History:   Procedure Laterality Date    ADENOIDECTOMY      BACK SURGERY      discectomy    COLONOSCOPY N/A 9/12/2013    [facility]    FLEXIBLE SIGMOIDOSCOPY  8/7/1990  [facility]    TONSILLECTOMY      WISDOM TOOTH EXTRACTION         Review of patient's allergies indicates:   Allergen Reactions    Statins-hmg-coa reductase inhibitors Other (See Comments)     Severe pain to her left side    Meperidine      Other reaction(s): hyper    Sulfa (sulfonamide antibiotics) Rash     Other reaction(s): Itching       Social History     Socioeconomic History    Marital status:      Spouse name: Not on file    Number of children: Not on file    Years of education: Not on file    Highest education level: Not on file   Social Needs    Financial resource strain: Not on file    Food insecurity - worry: Not on file    Food insecurity - inability: Not on file    Transportation needs - medical: Not on file    Transportation needs - non-medical: Not on file   Occupational History    Occupation:    Tobacco Use    Smoking status: Never Smoker    Smokeless tobacco: Never Used   Substance and Sexual Activity    Alcohol use: No     Comment: rare    Drug use: No    Sexual activity: No   Other Topics Concern    Not on file   Social History Narrative    Not on file       Current Outpatient Medications on File Prior to Visit   Medication Sig Dispense Refill    ascorbic acid (VITAMIN C) 500 MG tablet Take 1 tablet by mouth Daily.      glucosamine-D3-Boswellia serr (OSTEO BI-FLEX, 5-LOXIN,)  "1,500-400-100 mg-unit-mg Tab Take by mouth.      metFORMIN (GLUCOPHAGE) 500 MG tablet Take 1 tablet (500 mg total) by mouth 2 (two) times daily with meals. 60 tablet 11    rosuvastatin (CRESTOR) 10 MG tablet Take 1 tablet (10 mg total) by mouth once daily. 30 tablet 11    UBIDECARENONE (CO Q-10 ORAL) Take by mouth.      vitamin D 185 MG Tab Take 2,000 mg by mouth once daily.        VITAMIN E ACETATE (VITAMIN E ORAL) Take 1 capsule by mouth Daily.       No current facility-administered medications on file prior to visit.        Family History   Problem Relation Age of Onset    Diabetes Mother     Coronary artery disease Mother     Diabetes Son     Cancer Maternal Aunt     Cerebral aneurysm Father     Coronary artery disease Sister     Coronary artery disease Brother     Heart disease Neg Hx        Review of Systems   Constitutional: Negative for appetite change, chills, fever and unexpected weight change.   HENT: Negative for sore throat and trouble swallowing.    Eyes: Negative for pain and visual disturbance.   Respiratory: Negative for cough, shortness of breath and wheezing.    Cardiovascular: Negative for chest pain and palpitations.   Gastrointestinal: Negative for abdominal pain, blood in stool, diarrhea, nausea and vomiting.   Genitourinary: Negative for difficulty urinating, dysuria and hematuria.   Musculoskeletal: Negative for arthralgias, gait problem and neck pain.   Skin: Negative for rash and wound.   Neurological: Negative for dizziness, weakness, numbness and headaches.   Hematological: Negative for adenopathy.   Psychiatric/Behavioral: Negative for dysphoric mood.       Objective:      /70   Pulse 82   Resp 18   Ht 5' 4" (1.626 m)   Wt 63.9 kg (140 lb 14 oz)   SpO2 98%   BMI 24.18 kg/m²   Physical Exam   Constitutional: She is oriented to person, place, and time. She appears well-developed and well-nourished.   HENT:   Head: Normocephalic.   Mouth/Throat: Oropharynx is clear " and moist. No oropharyngeal exudate or posterior oropharyngeal erythema.   Eyes: Conjunctivae and EOM are normal. Pupils are equal, round, and reactive to light.   Neck: Normal range of motion. Neck supple. No thyromegaly present.   Cardiovascular: Normal rate, regular rhythm, S1 normal, S2 normal, normal heart sounds and intact distal pulses. Exam reveals no gallop and no friction rub.   No murmur heard.  Pulmonary/Chest: Effort normal and breath sounds normal. She has no wheezes. She has no rales.   Abdominal: Normal appearance.   Musculoskeletal:        Right lower leg: She exhibits no edema.        Left lower leg: She exhibits no edema.   Lymphadenopathy:     She has no cervical adenopathy.   Neurological: She is alert and oriented to person, place, and time. No cranial nerve deficit. Gait normal.   Skin: Skin is warm and intact. No rash noted.   Psychiatric: She has a normal mood and affect.       Results for orders placed or performed in visit on 11/16/18   Hemoglobin A1c   Result Value Ref Range    Hemoglobin A1C 6.1 (H) 4.0 - 5.6 %    Estimated Avg Glucose 128 68 - 131 mg/dL   Comprehensive metabolic panel   Result Value Ref Range    Sodium 141 136 - 145 mmol/L    Potassium 4.0 3.5 - 5.1 mmol/L    Chloride 102 95 - 110 mmol/L    CO2 27 23 - 29 mmol/L    Glucose 141 (H) 70 - 110 mg/dL    BUN, Bld 16 8 - 23 mg/dL    Creatinine 0.8 0.5 - 1.4 mg/dL    Calcium 9.7 8.7 - 10.5 mg/dL    Total Protein 8.2 6.0 - 8.4 g/dL    Albumin 4.4 3.5 - 5.2 g/dL    Total Bilirubin 1.4 (H) 0.1 - 1.0 mg/dL    Alkaline Phosphatase 91 55 - 135 U/L    AST 22 10 - 40 U/L    ALT 19 10 - 44 U/L    Anion Gap 12 8 - 16 mmol/L    eGFR if African American >60.0 >60 mL/min/1.73 m^2    eGFR if non African American >60.0 >60 mL/min/1.73 m^2   Lipid panel   Result Value Ref Range    Cholesterol 191 120 - 199 mg/dL    Triglycerides 92 30 - 150 mg/dL    HDL 66 40 - 75 mg/dL    LDL Cholesterol 106.6 63.0 - 159.0 mg/dL    HDL/Chol Ratio 34.6 20.0 -  50.0 %    Total Cholesterol/HDL Ratio 2.9 2.0 - 5.0    Non-HDL Cholesterol 125 mg/dL       Assessment:       1. Controlled type 2 diabetes mellitus without complication, without long-term current use of insulin    2. Hyperlipidemia LDL goal <100        Plan:       Controlled type 2 diabetes mellitus without complication, without long-term current use of insulin    Hyperlipidemia LDL goal <100      continue present meds  F/u 6 months for executive health  Counseled on regular exercise, maintenance of a healthy weight, balanced diet rich in fruits/vegetables and lean protein, and avoidance of unhealthy habits like smoking and excessive alcohol intake.

## 2019-04-12 DIAGNOSIS — Z00.00 ANNUAL PHYSICAL EXAM: Primary | ICD-10-CM

## 2019-05-08 DIAGNOSIS — E11.9 CONTROLLED TYPE 2 DIABETES MELLITUS WITHOUT COMPLICATION, WITHOUT LONG-TERM CURRENT USE OF INSULIN: ICD-10-CM

## 2019-05-08 RX ORDER — METFORMIN HYDROCHLORIDE 500 MG/1
TABLET ORAL
Qty: 60 TABLET | Refills: 11 | Status: SHIPPED | OUTPATIENT
Start: 2019-05-08 | End: 2019-11-05 | Stop reason: SDUPTHER

## 2019-05-16 ENCOUNTER — PATIENT OUTREACH (OUTPATIENT)
Dept: ADMINISTRATIVE | Facility: HOSPITAL | Age: 69
End: 2019-05-16

## 2019-05-16 NOTE — LETTER
May 23, 2019    Eulalia Villarreal  [address]  [facility] [address]             [facility]  [address]  [facility] [address]  Phone: [phone] Dear Ms. Villarreal:    We have tried to reach you by [facility] email unsuccessfully.      [facility] is committed to your overall health.  To help you get the most out of each of your visits, we will review your information to make sure you are up to date on all of your recommended tests and/or procedures.       Sunny Gee MD   has found that your chart shows you may be due for the following:     Hemoglobin A1c   Mammogram   Foot Exam   Eye Exam     If you have had any of the above done at another facility, please bring the records with you or fax them to [phone] so that your record at [facility] will be complete. If you have not had any of these tests or procedures done recently and would like to complete this testing ,  please call [phone] or send a message through your [facility] portal to your provider's office.     If you have an upcoming scheduled appointment for the above test and/or procedures, please disregard this letter.      Sincerely,    Nuris Irwin, Care Coordinator  [facility]  Phone: [phone]  Fax: [phone]

## 2019-05-16 NOTE — PROGRESS NOTES
Health Maintenance Due   Topic Date Due    Shingles Vaccine (1 of 2) 07/20/2000    Hemoglobin A1c  05/16/2019    Mammogram  05/21/2019    Foot Exam  05/24/2019    Eye Exam  06/07/2019     Chart review complete/scrubbed 05/16/2019  Future Appointments   Date Time Provider Department Center   5/27/2019  7:45 AM EKG, PAMELA [facility] CARDIO [place]   5/27/2019  8:00 AM [facility]   5/27/2019  8:15 AM [facility] MAMMO1 [facility] MAMMO [place]   5/29/2019  8:40 AM Sunny Gee MD [facility] MED [place]

## 2019-05-23 DIAGNOSIS — E11.9 TYPE 2 DIABETES MELLITUS WITHOUT COMPLICATION: ICD-10-CM

## 2019-05-23 LAB
LEFT EYE DM RETINOPATHY: NEGATIVE
RIGHT EYE DM RETINOPATHY: NEGATIVE

## 2019-05-27 ENCOUNTER — HOSPITAL ENCOUNTER (OUTPATIENT)
Dept: RADIOLOGY | Facility: HOSPITAL | Age: 69
Discharge: HOME | End: 2019-05-27
Attending: FAMILY MEDICINE
Payer: COMMERCIAL

## 2019-05-27 ENCOUNTER — CLINICAL SUPPORT (OUTPATIENT)
Dept: CARDIOLOGY | Facility: CLINIC | Age: 69
End: 2019-05-27
Payer: COMMERCIAL

## 2019-05-27 ENCOUNTER — CLINICAL SUPPORT (OUTPATIENT)
Dept: INTERNAL MEDICINE | Facility: CLINIC | Age: 69
End: 2019-05-27
Payer: COMMERCIAL

## 2019-05-27 DIAGNOSIS — Z00.00 ANNUAL PHYSICAL EXAM: ICD-10-CM

## 2019-05-27 DIAGNOSIS — Z00.00 ANNUAL PHYSICAL EXAM: Primary | ICD-10-CM

## 2019-05-27 LAB
ALBUMIN SERPL BCP-MCNC: 4 G/DL (ref 3.5–5.2)
ALP SERPL-CCNC: 77 U/L (ref 55–135)
ALT SERPL W/O P-5'-P-CCNC: 21 U/L (ref 10–44)
ANION GAP SERPL CALC-SCNC: 9 MMOL/L (ref 8–16)
AST SERPL-CCNC: 26 U/L (ref 10–40)
BILIRUB SERPL-MCNC: 1.5 MG/DL (ref 0.1–1)
BUN SERPL-MCNC: 23 MG/DL (ref 8–23)
CALCIUM SERPL-MCNC: 9.8 MG/DL (ref 8.7–10.5)
CHLORIDE SERPL-SCNC: 104 MMOL/L (ref 95–110)
CHOLEST SERPL-MCNC: 185 MG/DL (ref 120–199)
CHOLEST/HDLC SERPL: 3.2 {RATIO} (ref 2–5)
CO2 SERPL-SCNC: 26 MMOL/L (ref 23–29)
CREAT SERPL-MCNC: 0.7 MG/DL (ref 0.5–1.4)
ERYTHROCYTE [DISTWIDTH] IN BLOOD BY AUTOMATED COUNT: 13.1 % (ref 11.5–14.5)
EST. GFR  (AFRICAN AMERICAN): >60 ML/MIN/1.73 M^2
EST. GFR  (NON AFRICAN AMERICAN): >60 ML/MIN/1.73 M^2
GLUCOSE SERPL-MCNC: 114 MG/DL (ref 70–110)
HCT VFR BLD AUTO: 43 % (ref 37–48.5)
HDLC SERPL-MCNC: 58 MG/DL (ref 40–75)
HDLC SERPL: 31.4 % (ref 20–50)
HGB BLD-MCNC: 13.6 G/DL (ref 12–16)
LDLC SERPL CALC-MCNC: 101.6 MG/DL (ref 63–159)
MCH RBC QN AUTO: 30.2 PG (ref 27–31)
MCHC RBC AUTO-ENTMCNC: 31.6 G/DL (ref 32–36)
MCV RBC AUTO: 95 FL (ref 82–98)
NONHDLC SERPL-MCNC: 127 MG/DL
PLATELET # BLD AUTO: 258 K/UL (ref 150–350)
PMV BLD AUTO: 12.8 FL (ref 9.2–12.9)
POTASSIUM SERPL-SCNC: 4.6 MMOL/L (ref 3.5–5.1)
PROT SERPL-MCNC: 7.3 G/DL (ref 6–8.4)
RBC # BLD AUTO: 4.51 M/UL (ref 4–5.4)
SODIUM SERPL-SCNC: 139 MMOL/L (ref 136–145)
TRIGL SERPL-MCNC: 127 MG/DL (ref 30–150)
WBC # BLD AUTO: 6.83 K/UL (ref 3.9–12.7)

## 2019-05-27 PROCEDURE — 77063 BREAST TOMOSYNTHESIS BI: CPT | Mod: 26,,, | Performed by: RADIOLOGY

## 2019-05-27 PROCEDURE — 77067 SCR MAMMO BI INCL CAD: CPT | Mod: 26,,, | Performed by: RADIOLOGY

## 2019-05-27 PROCEDURE — 77063 MAMMO DIGITAL SCREENING BILAT WITH TOMOSYNTHESIS_CAD: ICD-10-PCS | Mod: 26,,, | Performed by: RADIOLOGY

## 2019-05-27 PROCEDURE — 77067 MAMMO DIGITAL SCREENING BILAT WITH TOMOSYNTHESIS_CAD: ICD-10-PCS | Mod: 26,,, | Performed by: RADIOLOGY

## 2019-05-27 PROCEDURE — 85027 COMPLETE CBC AUTOMATED: CPT

## 2019-05-27 PROCEDURE — 93010 ELECTROCARDIOGRAM REPORT: CPT | Mod: S$PBB,,, | Performed by: INTERNAL MEDICINE

## 2019-05-27 PROCEDURE — 80053 COMPREHEN METABOLIC PANEL: CPT

## 2019-05-27 PROCEDURE — 93010 EKG 12-LEAD: ICD-10-PCS | Mod: S$PBB,,, | Performed by: INTERNAL MEDICINE

## 2019-05-27 PROCEDURE — 93005 ELECTROCARDIOGRAM TRACING: CPT | Mod: PBBFAC,PO | Performed by: INTERNAL MEDICINE

## 2019-05-27 PROCEDURE — 80061 LIPID PANEL: CPT

## 2019-05-27 PROCEDURE — 77067 SCR MAMMO BI INCL CAD: CPT | Mod: TC,PO

## 2019-05-27 NOTE — PROGRESS NOTES
Unable to obtain blood draw on 1st attempt, brought pt to lab for blood draw. Labs brought down to lab for processing.

## 2019-05-28 ENCOUNTER — PATIENT OUTREACH (OUTPATIENT)
Dept: ADMINISTRATIVE | Facility: HOSPITAL | Age: 69
End: 2019-05-28

## 2019-05-29 ENCOUNTER — OFFICE VISIT (OUTPATIENT)
Dept: FAMILY MEDICINE | Facility: CLINIC | Age: 69
End: 2019-05-29
Payer: COMMERCIAL

## 2019-05-29 VITALS
HEART RATE: 91 BPM | TEMPERATURE: 98 F | BODY MASS INDEX: 23.78 KG/M2 | DIASTOLIC BLOOD PRESSURE: 70 MMHG | WEIGHT: 139.31 LBS | HEIGHT: 64 IN | OXYGEN SATURATION: 96 % | SYSTOLIC BLOOD PRESSURE: 110 MMHG

## 2019-05-29 DIAGNOSIS — E11.9 CONTROLLED TYPE 2 DIABETES MELLITUS WITHOUT COMPLICATION, WITHOUT LONG-TERM CURRENT USE OF INSULIN: ICD-10-CM

## 2019-05-29 DIAGNOSIS — Z00.00 PREVENTATIVE HEALTH CARE: Primary | ICD-10-CM

## 2019-05-29 DIAGNOSIS — E78.5 HYPERLIPIDEMIA LDL GOAL <100: ICD-10-CM

## 2019-05-29 PROCEDURE — 99397 PR PREVENTIVE VISIT,EST,65 & OVER: ICD-10-PCS | Mod: S$GLB,,, | Performed by: FAMILY MEDICINE

## 2019-05-29 PROCEDURE — 99397 PER PM REEVAL EST PAT 65+ YR: CPT | Mod: S$GLB,,, | Performed by: FAMILY MEDICINE

## 2019-05-29 PROCEDURE — 99999 PR PBB SHADOW E&M-EST. PATIENT-LVL III: CPT | Mod: PBBFAC,,, | Performed by: FAMILY MEDICINE

## 2019-05-29 PROCEDURE — 99999 PR PBB SHADOW E&M-EST. PATIENT-LVL III: ICD-10-PCS | Mod: PBBFAC,,, | Performed by: FAMILY MEDICINE

## 2019-05-29 RX ORDER — ROSUVASTATIN CALCIUM 10 MG/1
10 TABLET, COATED ORAL DAILY
Qty: 30 TABLET | Refills: 11 | Status: SHIPPED | OUTPATIENT
Start: 2019-05-29 | End: 2019-11-05 | Stop reason: SDUPTHER

## 2019-05-29 NOTE — PROGRESS NOTES
May 29, 2019                                                                                                                                                                                                                                                                                      Eulalia Villarreal  [address]  [place] [address]                                                                                                                                                                                                                                                                                                RE: Eulalia Villarreal                                                        Clinic #:[id number]                                                                                                                                   Dear  Eulalia Villarreal,                                                                                                                                           Thank you for allowing me to serve you and perform your Executive Health exam on May 29, 2019.   This letter will serve a brief summary of the history, physical findings, and laboratory/studies performed and recommendations at that time.                                                                                     REASON FOR VISIT: Executive Health Preventive Physical Examination    Past Medical History:   Diagnosis Date    Arthritis     hands    Diabetes mellitus     Herniated disc     L5    Hyperlipidemia        Past Surgical History:   Procedure Laterality Date    ADENOIDECTOMY      BACK SURGERY      discectomy    COLONOSCOPY N/A 9/12/2013    Performed by Eliseo Yates Jr., MD at [facility]    FLEX[facility]  8/7/1990  [facility]    TONSILLECTOMY      WISDOM TOOTH EXTRACTION         Family History   Problem Relation Age of Onset    Diabetes Mother     Coronary artery disease Mother      Diabetes Son     Cancer Maternal Aunt     Cerebral aneurysm Father     Coronary artery disease Sister     Coronary artery disease Brother     Heart disease Neg Hx        Social History     Social History    Marital status:      Spouse name: N/A     Occupational History          Social History Main Topics    Smoking status: Never Smoker    Smokeless tobacco: Never Used    Alcohol use No      Comment: rare     Allergies: Statins-hmg-coa reductase inhibitors; Meperidine; and Sulfa (sulfonamide antibiotics)    Current Outpatient Medications on File Prior to Visit   Medication Sig Dispense Refill    ascorbic acid (VITAMIN C) 500 MG tablet Take 1 tablet by mouth Daily.      glucosamine-D3-Boswellia serr (OSTEO BI-FLEX, 5-LOXIN,) 1,500-400-100 mg-unit-mg Tab Take by mouth.      metFORMIN (GLUCOPHAGE) 500 MG tablet TAKE 1 TABLET BY MOUTH TWICE A DAY WITH FOOD 60 tablet 11    UBIDECARENONE (CO Q-10 ORAL) Take by mouth.      vitamin D 185 MG Tab Take 2,000 mg by mouth once daily.        VITAMIN E ACETATE (VITAMIN E ORAL) Take 1 capsule by mouth Daily.      [DISCONTINUED] rosuvastatin (CRESTOR) 10 MG tablet Take 1 tablet (10 mg total) by mouth once daily. 30 tablet 11     No current facility-administered medications on file prior to visit.    ]    REVIEW OF SYSTEMS:  No recent changes in weight, or complaints of fatigue. No recent changes in vision, or hearing. Denies frequent headaches.No recent changes in voice. No new or changing skin lesions. Denies abnormal bruising or bleeding.  Denies chest pain or sensation of skipped beats. No new onset of shortness of breath, or dyspnea on exertion. Denies abdominal discomfort, constipation, diarrhea,or blood in stool. Denies difficulty with urination.   No recent joint swelling or muscle discomfort. Denies pain or weakness in extremeties. No recent loss of balance. Denies problems with sleep or depression.        Remainder of the review of  "systems without pertinent positves at this time.  Some bilateral hip pain.; left greater than right                                                                              PHYSICAL EXAM:   VITAL SIGNS:   /70 (BP Location: Left arm, Patient Position: Sitting)   Pulse 91   Temp 98.3 °F (36.8 °C) (Oral)   Ht 5' 4" (1.626 m)   Wt 63.2 kg (139 lb 5.3 oz)   SpO2 96%   BMI 23.92 kg/m²   GENERAL APPEARANCE:  Well nourished and normally developed,  pleasant 68 y.o. female, in good spirits.  SKIN: Without rashes or overt lesions.  HEENT: Head normacephalic. There was no scleral icterus. Mucous membranes were moist. Dentition. Neck is supple, no thyromegally, or carotid bruits.  LUNGS: Clear to auscultation bilaterally. Normal respiratory effort.  HEART: Exam reveals regular rate and rhythm. First and second heart sounds normal. No murmurs, rubs or gallops.   ABDOMEN: Soft, non-tender, non-distended. Exam reveals normal bowl sounds, no masses, no organomegaly and no aortic enlargement.    EXTREMITIES:  Nonedematous, both femoral and pedal pulses are normal. No joint stiffness or tenderness. Full range of motion and strength, upper and lower bilaterally.  Foot exam: inspection normal, sensation intact; 2+ DP pulses bilaterally    LAB DATA/STUDIES REVIEWED:  LABS: reviewed  Mammogram: normal  EKG: normal    ASSESSMENT/RECOMMENDATIONS :    At this time,  you appear to be in good medical condition.    Continue your present medications  shingrix vaccine advised  Continue to work on regular exercise, maintenance of a healthy weight, balanced diet rich in fruits/vegetables and lean protein, and avoidance of unhealthy habits like smoking and excessive alcohol intake.  I look forward to seeing you again in 6 months with the labwork  Please contact me should you have any questions or concerns regarding physical findings, or my recommendations.       Sincerely yours,    Sunny Gee M.D.  Department of Family " Practice  [facility]

## 2019-09-24 ENCOUNTER — TELEPHONE (OUTPATIENT)
Dept: FAMILY MEDICINE | Facility: CLINIC | Age: 69
End: 2019-09-24

## 2019-09-24 DIAGNOSIS — L60.8 TOENAIL DEFORMITY: Primary | ICD-10-CM

## 2019-09-24 NOTE — TELEPHONE ENCOUNTER
Referral needed for podiatry.   See message thread.  Will need to forward to [place] if approved.

## 2019-09-24 NOTE — TELEPHONE ENCOUNTER
----- Message from Lianna Cardona sent at 9/24/2019 11:54 AM CDT -----  .Type:  Patient Requesting Referral    Who Called:  Patient   Does the patient already have the specialty appointment scheduled?:  no  If yes, what is the date of that appointment?:    Referral to What Specialty:  Podiatry  Reason for Referral:  Spot on toe nail  Does the patient want the referral with a specific physician?:  [facility]  [facility]  Patient Requesting a Call Back?:  yes  Best Call Back Number:  [phone] home, [phone] cell  Additional Information:

## 2019-09-25 NOTE — TELEPHONE ENCOUNTER
Spoke with pt and informed her that requested referral was entered, pt states that she will call back to schedule once she clears up a couple of insurance issues.

## 2019-10-14 DIAGNOSIS — E11.9 TYPE 2 DIABETES MELLITUS WITHOUT COMPLICATION, UNSPECIFIED WHETHER LONG TERM INSULIN USE: ICD-10-CM

## 2019-10-18 ENCOUNTER — OFFICE VISIT (OUTPATIENT)
Dept: PODIATRY | Facility: CLINIC | Age: 69
End: 2019-10-18
Payer: MEDICARE

## 2019-10-18 VITALS
HEART RATE: 83 BPM | RESPIRATION RATE: 14 BRPM | WEIGHT: 138.44 LBS | BODY MASS INDEX: 23.64 KG/M2 | HEIGHT: 64 IN | DIASTOLIC BLOOD PRESSURE: 75 MMHG | SYSTOLIC BLOOD PRESSURE: 143 MMHG

## 2019-10-18 DIAGNOSIS — L60.3 DYSTROPHIC NAIL: ICD-10-CM

## 2019-10-18 DIAGNOSIS — E11.9 CONTROLLED TYPE 2 DIABETES MELLITUS WITHOUT COMPLICATION, WITHOUT LONG-TERM CURRENT USE OF INSULIN: Primary | ICD-10-CM

## 2019-10-18 PROCEDURE — 99499 RISK ADDL DX/OHS AUDIT: ICD-10-PCS | Mod: S$GLB,,, | Performed by: PODIATRIST

## 2019-10-18 PROCEDURE — 3044F HG A1C LEVEL LT 7.0%: CPT | Mod: CPTII,S$GLB,, | Performed by: PODIATRIST

## 2019-10-18 PROCEDURE — 99202 OFFICE O/P NEW SF 15 MIN: CPT | Mod: S$GLB,,, | Performed by: PODIATRIST

## 2019-10-18 PROCEDURE — 1101F PT FALLS ASSESS-DOCD LE1/YR: CPT | Mod: CPTII,S$GLB,, | Performed by: PODIATRIST

## 2019-10-18 PROCEDURE — 99202 PR OFFICE/OUTPT VISIT, NEW, LEVL II, 15-29 MIN: ICD-10-PCS | Mod: S$GLB,,, | Performed by: PODIATRIST

## 2019-10-18 PROCEDURE — 3044F PR MOST RECENT HEMOGLOBIN A1C LEVEL <7.0%: ICD-10-PCS | Mod: CPTII,S$GLB,, | Performed by: PODIATRIST

## 2019-10-18 PROCEDURE — 99999 PR PBB SHADOW E&M-EST. PATIENT-LVL III: CPT | Mod: PBBFAC,,, | Performed by: PODIATRIST

## 2019-10-18 PROCEDURE — 1101F PR PT FALLS ASSESS DOC 0-1 FALLS W/OUT INJ PAST YR: ICD-10-PCS | Mod: CPTII,S$GLB,, | Performed by: PODIATRIST

## 2019-10-18 PROCEDURE — 99499 UNLISTED E&M SERVICE: CPT | Mod: S$GLB,,, | Performed by: PODIATRIST

## 2019-10-18 PROCEDURE — 99999 PR PBB SHADOW E&M-EST. PATIENT-LVL III: ICD-10-PCS | Mod: PBBFAC,,, | Performed by: PODIATRIST

## 2019-10-18 NOTE — LETTER
October 18, 2019      Sunny Gee MD  [address] [facility] [address] [place] [address]           [facility]  [address] [facility] [address] [place] [address]  Phone: [phone]          Patient: Eulalia Villarreal   MR Number: [id number]   YOB: 1950   Date of Visit: 10/18/2019       Dear Dr. Sunny Gee:    Thank you for referring Eulalia Villarreal to me for evaluation. Attached you will find relevant portions of my assessment and plan of care.    If you have questions, please do not hesitate to call me. I look forward to following Eulalia Villarreal along with you.    Sincerely,    Perez Rangel, ZOHRA    Enclosure  CC:  No Recipients    If you would like to receive this communication electronically, please contact [email or web address] or [phone] to request more information on [facility] access.    For providers and/or their staff who would like to refer a patient to [facility], please contact us through our one-stop-shop provider referral line, Amauri Blake, at [phone].    If you feel you have received this communication in error or would no longer like to receive these types of communications, please e-mail [email or web address]

## 2019-10-18 NOTE — PROGRESS NOTES
Subjective:      Patient ID: Eulalia Villarreal is a 69 y.o. female.    Chief Complaint: Foot Problem (Left Toenail deformity; PCP- Dr. Gee- 5/29/19 A1C-6.1- 11/16/18)    Eulalia is a 69 y.o. female who presents to the clinic upon referral from Dr. Gee  for evaluation and treatment of diabetic feet. Eulalia has a past medical history of Arthritis, Diabetes mellitus, Herniated disc, and Hyperlipidemia. Patient relates no major problem with feet. Only complaints today consist of left great toenail there is a transverse line in the nail with slight thickness, it has been present for months and seems to be growing out distal slowly. No pain to the nail.    PCP: Sunny Gee MD    Date Last Seen by PCP: 5/29/19    Current shoe gear: Casual shoes    Hemoglobin A1C   Date Value Ref Range Status   11/16/2018 6.1 (H) 4.0 - 5.6 % Final     Comment:     ADA Screening Guidelines:  5.7-6.4%  Consistent with prediabetes  >or=6.5%  Consistent with diabetes  High levels of fetal hemoglobin interfere with the HbA1C  assay. Heterozygous hemoglobin variants (HbS, HgC, etc)do  not significantly interfere with this assay.   However, presence of multiple variants may affect accuracy.     03/28/2017 6.3 (H) 4.5 - 6.2 % Final     Comment:     According to ADA guidelines, hemoglobin A1C <7.0% represents  optimal control in non-pregnant diabetic patients.  Different  metrics may apply to specific populations.   Standards of Medical Care in Diabetes - 2016.  For the purpose of screening for the presence of diabetes:  <5.7%     Consistent with the absence of diabetes  5.7-6.4%  Consistent with increasing risk for diabetes   (prediabetes)  >or=6.5%  Consistent with diabetes  Currently no consensus exists for use of hemoglobin A1C  for diagnosis of diabetes for children.     07/22/2016 6.3 (H) 4.5 - 6.2 % Final     Comment:     According to ADA guidelines, hemoglobin A1C <7.0% represents  optimal control in non-pregnant diabetic  patients.  Different  metrics may apply to specific populations.   Standards of Medical Care in Diabetes - 2016.  For the purpose of screening for the presence of diabetes:  <5.7%     Consistent with the absence of diabetes  5.7-6.4%  Consistent with increasing risk for diabetes   (prediabetes)  >or=6.5%  Consistent with diabetes  Currently no consensus exists for use of hemoglobin A1C  for diagnosis of diabetes for children.             Review of Systems   Constitution: Negative for chills and fever.   Cardiovascular: Negative for claudication and leg swelling.   Respiratory: Negative for shortness of breath.    Skin: Positive for nail changes. Negative for itching and rash.   Musculoskeletal: Negative for muscle cramps, muscle weakness and myalgias.   Gastrointestinal: Negative for nausea and vomiting.   Neurological: Negative for focal weakness, loss of balance, numbness and paresthesias.           Objective:      Physical Exam   Constitutional: She is oriented to person, place, and time. She appears well-developed and well-nourished. No distress.   Cardiovascular:   Pulses:       Dorsalis pedis pulses are 2+ on the right side, and 2+ on the left side.        Posterior tibial pulses are 2+ on the right side, and 2+ on the left side.   < 3 sec capillary refill time to toes 1-5 bilateral. Toes and feet are warm to touch proximally with normal distal cooling b/l. There is some hair growth on the feet and toes b/l. There is no edema b/l. No spider veins or varicosities present b/l.      Musculoskeletal:   Equinus noted b/l ankles with < 10 deg DF noted. MMT 5/5 in DF/PF/Inv/Ev resistance with no reproduction of pain in any direction. Passive range of motion of ankle and pedal joints is painless b/l.     Feet:   Right Foot:   Protective Sensation: 10 sites tested. 10 sites sensed.   Left Foot:   Protective Sensation: 10 sites tested. 10 sites sensed.   Neurological: She is alert and oriented to person, place, and  time. She has normal strength. She displays no atrophy and no tremor. No sensory deficit. She exhibits normal muscle tone.   Negative tinel sign bilateral.   Skin: Skin is warm, dry and intact. No abrasion, no bruising, no burn, no ecchymosis, no laceration, no lesion, no petechiae and no rash noted. She is not diaphoretic. No cyanosis or erythema. No pallor. Nails show no clubbing.   Skin temperature, texture and turgor within normal limits.    Left great toenail there is a transverse line noted appears to have been trauma at one point to that area. No underlying debris, almost grown out at the end of the nail with the more proximal portion of the nail normal in color and thickness/appearance.   Psychiatric: She has a normal mood and affect. Her behavior is normal.             Assessment:       Encounter Diagnoses   Name Primary?    Controlled type 2 diabetes mellitus without complication, without long-term current use of insulin Yes    Dystrophic nail          Plan:       Eulalia was seen today for foot problem.    Diagnoses and all orders for this visit:    Controlled type 2 diabetes mellitus without complication, without long-term current use of insulin    Dystrophic nail      I counseled the patient on her conditions, their implications and medical management.    The thickened part of the nail appears to be growing out, I believe it will grow out and can be cut and trimmed down without problem within the next month or so.    Shoe inspection. Diabetic Foot Education. Patient reminded of the importance of good nutrition and blood sugar control to help prevent podiatric complications of diabetes. Patient instructed on proper foot hygeine. We discussed wearing proper shoe gear, daily foot inspections, never walking without protective shoe gear, never putting sharp instruments to feet    Return PRN or 1 year diabetic foot check, low risk with good circulation and without signs of neuropathy    Perez Rangel DPM

## 2019-10-22 ENCOUNTER — PATIENT OUTREACH (OUTPATIENT)
Dept: ADMINISTRATIVE | Facility: HOSPITAL | Age: 69
End: 2019-10-22

## 2019-10-22 NOTE — LETTER
AUTHORIZATION FOR RELEASE OF   CONFIDENTIAL INFORMATION    Ector Phillips OD    We are seeing Eulalia Villarreal, date of birth 1950, in the clinic at [facility]. Sunny Gee MD is the patient's PCP. Eulalia Villarreal has an outstanding lab/procedure at the time we reviewed her chart. In order to help keep her health information updated, she has authorized us to request the following medical record(s):       EYE EXAM                Please fax records to [facility], Kevin C. Plaisance, MD,  [phone]     If you have any questions, please contact Nuris Irwin, Care Coordinator   at [phone].            Patient Name: Eulalia Villarreal  : 1950  Patient Phone #: [phone]

## 2019-10-30 ENCOUNTER — TELEPHONE (OUTPATIENT)
Dept: ADMINISTRATIVE | Facility: HOSPITAL | Age: 69
End: 2019-10-30

## 2019-11-01 ENCOUNTER — LAB VISIT (OUTPATIENT)
Dept: LAB | Facility: HOSPITAL | Age: 69
End: 2019-11-01
Attending: FAMILY MEDICINE
Payer: MEDICARE

## 2019-11-01 DIAGNOSIS — E11.9 CONTROLLED TYPE 2 DIABETES MELLITUS WITHOUT COMPLICATION, WITHOUT LONG-TERM CURRENT USE OF INSULIN: ICD-10-CM

## 2019-11-01 LAB
ALBUMIN/CREAT UR: 12 UG/MG (ref 0–30)
CREAT UR-MCNC: 83 MG/DL (ref 15–325)
MICROALBUMIN UR DL<=1MG/L-MCNC: 10 UG/ML

## 2019-11-01 PROCEDURE — 82043 UR ALBUMIN QUANTITATIVE: CPT

## 2019-11-05 ENCOUNTER — HOSPITAL ENCOUNTER (OUTPATIENT)
Dept: RADIOLOGY | Facility: HOSPITAL | Age: 69
Discharge: HOME | End: 2019-11-05
Attending: FAMILY MEDICINE
Payer: MEDICARE

## 2019-11-05 ENCOUNTER — OFFICE VISIT (OUTPATIENT)
Dept: FAMILY MEDICINE | Facility: CLINIC | Age: 69
End: 2019-11-05
Payer: MEDICARE

## 2019-11-05 ENCOUNTER — IMMUNIZATION (OUTPATIENT)
Dept: PHARMACY | Facility: CLINIC | Age: 69
End: 2019-11-05
Payer: MEDICARE

## 2019-11-05 VITALS
OXYGEN SATURATION: 98 % | TEMPERATURE: 99 F | BODY MASS INDEX: 23.49 KG/M2 | HEART RATE: 71 BPM | HEIGHT: 64 IN | DIASTOLIC BLOOD PRESSURE: 82 MMHG | SYSTOLIC BLOOD PRESSURE: 136 MMHG | WEIGHT: 137.56 LBS

## 2019-11-05 DIAGNOSIS — Z00.00 PREVENTATIVE HEALTH CARE: Primary | ICD-10-CM

## 2019-11-05 DIAGNOSIS — E11.9 CONTROLLED TYPE 2 DIABETES MELLITUS WITHOUT COMPLICATION, WITHOUT LONG-TERM CURRENT USE OF INSULIN: ICD-10-CM

## 2019-11-05 DIAGNOSIS — G54.9 PLEXOPATHY: ICD-10-CM

## 2019-11-05 DIAGNOSIS — E78.5 HYPERLIPIDEMIA LDL GOAL <100: ICD-10-CM

## 2019-11-05 PROCEDURE — 99999 PR PBB SHADOW E&M-EST. PATIENT-LVL IV: ICD-10-PCS | Mod: PBBFAC,,, | Performed by: FAMILY MEDICINE

## 2019-11-05 PROCEDURE — 99214 OFFICE O/P EST MOD 30 MIN: CPT | Mod: S$GLB,,, | Performed by: FAMILY MEDICINE

## 2019-11-05 PROCEDURE — 99214 PR OFFICE/OUTPT VISIT, EST, LEVL IV, 30-39 MIN: ICD-10-PCS | Mod: S$GLB,,, | Performed by: FAMILY MEDICINE

## 2019-11-05 PROCEDURE — 3044F PR MOST RECENT HEMOGLOBIN A1C LEVEL <7.0%: ICD-10-PCS | Mod: CPTII,S$GLB,, | Performed by: FAMILY MEDICINE

## 2019-11-05 PROCEDURE — 72110 XR LUMBAR SPINE 5 VIEW WITH FLEX AND EXT: ICD-10-PCS | Mod: 26,,, | Performed by: RADIOLOGY

## 2019-11-05 PROCEDURE — 3044F HG A1C LEVEL LT 7.0%: CPT | Mod: CPTII,S$GLB,, | Performed by: FAMILY MEDICINE

## 2019-11-05 PROCEDURE — 72110 X-RAY EXAM L-2 SPINE 4/>VWS: CPT | Mod: TC,FY,PO

## 2019-11-05 PROCEDURE — 99999 PR PBB SHADOW E&M-EST. PATIENT-LVL IV: CPT | Mod: PBBFAC,,, | Performed by: FAMILY MEDICINE

## 2019-11-05 PROCEDURE — 72110 X-RAY EXAM L-2 SPINE 4/>VWS: CPT | Mod: 26,,, | Performed by: RADIOLOGY

## 2019-11-05 RX ORDER — METFORMIN HYDROCHLORIDE 500 MG/1
500 TABLET ORAL 2 TIMES DAILY WITH MEALS
Qty: 180 TABLET | Refills: 3 | Status: SHIPPED | OUTPATIENT
Start: 2019-11-05 | End: 2020-12-23 | Stop reason: SDUPTHER

## 2019-11-05 RX ORDER — ROSUVASTATIN CALCIUM 10 MG/1
10 TABLET, COATED ORAL DAILY
Qty: 90 TABLET | Refills: 3 | Status: SHIPPED | OUTPATIENT
Start: 2019-11-05 | End: 2020-12-23 | Stop reason: SDUPTHER

## 2019-11-05 NOTE — PROGRESS NOTES
Subjective:       Patient ID: Eulalia Villarreal is a 69 y.o. female.    Chief Complaint: Follow-up (6 month) and Hip Pain (left hip is hurting)    Here for annual exam and for 6 month f/u on diabetes    DM2 - taking metformin 500mg BID  HLD - taking Crestor 10mg daily with CoQ 10    C/o some left lateral hip pain which radiates down to the ankle. Aching.  8/10  It awakens her every night  This has been progressive over months.  Tylenol does give some relief.        Follow-up   Associated symptoms include arthralgias. Pertinent negatives include no abdominal pain, chest pain, chills, coughing, fever, headaches, nausea, neck pain, numbness, rash, sore throat, vomiting or weakness.   Hip Pain    Pertinent negatives include no numbness.       Past Medical History:   Diagnosis Date    Arthritis     hands    Diabetes mellitus     Herniated disc     L5    Hyperlipidemia        Past Surgical History:   Procedure Laterality Date    ADENOIDECTOMY      BACK SURGERY  1982    discectomy    [facility]    TONSILLECTOMY      WISDOM TOOTH EXTRACTION         Review of patient's allergies indicates:   Allergen Reactions    Statins-hmg-coa reductase inhibitors Other (See Comments)     Severe pain to her left side    Meperidine      Other reaction(s): hyper    Sulfa (sulfonamide antibiotics) Rash     Other reaction(s): Itching       Social History     Socioeconomic History    Marital status:      Spouse name: Not on file    Number of children: Not on file    Years of education: Not on file    Highest education level: Not on file   Occupational History    Occupation:    Social Needs    Financial resource strain: Not on file    Food insecurity:     Worry: Not on file     Inability: Not on file    Transportation needs:     Medical: Not on file     Non-medical: Not on file   Tobacco Use    Smoking status: Never Smoker    Smokeless tobacco: Never Used   Substance and  Sexual Activity    Alcohol use: No     Comment: rare    Drug use: No    Sexual activity: Never   Lifestyle    Physical activity:     Days per week: Not on file     Minutes per session: Not on file    Stress: Not on file   Relationships    Social connections:     Talks on phone: Not on file     Gets together: Not on file     Attends L[employer]-day service: Not on file     Active member of club or organization: Not on file     Attends meetings of clubs or organizations: Not on file     Relationship status: Not on file   Other Topics Concern    Not on file   Social History Narrative    Not on file       Current Outpatient Medications on File Prior to Visit   Medication Sig Dispense Refill    ascorbic acid (VITAMIN C) 500 MG tablet Take 1 tablet by mouth Daily.      glucosamine-D3-Boswellia serr (OSTEO BI-FLEX, 5-LOXIN,) 1,500-400-100 mg-unit-mg Tab Take by mouth.      UBIDECARENONE (CO Q-10 ORAL) Take by mouth.      vitamin D 185 MG Tab Take 2,000 mg by mouth once daily.        VITAMIN E ACETATE (VITAMIN E ORAL) Take 1 capsule by mouth Daily.       No current facility-administered medications on file prior to visit.        Family History   Problem Relation Age of Onset    Diabetes Mother     Coronary artery disease Mother     Diabetes Son     Cancer Maternal Aunt     Cerebral aneurysm Father     Coronary artery disease Sister     Coronary artery disease Brother     Heart disease Neg Hx        Review of Systems   Constitutional: Negative for appetite change, chills, fever and unexpected weight change.   HENT: Negative for sore throat and trouble swallowing.    Eyes: Negative for pain and visual disturbance.   Respiratory: Negative for cough, shortness of breath and wheezing.    Cardiovascular: Negative for chest pain and palpitations.   Gastrointestinal: Negative for abdominal pain, blood in stool, diarrhea, nausea and vomiting.   Genitourinary: Negative for difficulty urinating, dysuria and hematuria.  "  Musculoskeletal: Positive for arthralgias. Negative for gait problem and neck pain.   Skin: Negative for rash and wound.   Neurological: Negative for dizziness, weakness, numbness and headaches.   Hematological: Negative for adenopathy.   Psychiatric/Behavioral: Negative for dysphoric mood.       Objective:      /82 (BP Location: Left arm, Patient Position: Sitting)   Pulse 71   Temp 98.8 °F (37.1 °C) (Oral)   Ht 5' 4" (1.626 m)   Wt 62.4 kg (137 lb 9.1 oz)   SpO2 98%   BMI 23.61 kg/m²   Physical Exam   Constitutional: She is oriented to person, place, and time. She appears well-developed and well-nourished.   HENT:   Head: Normocephalic.   Mouth/Throat: Oropharynx is clear and moist. No oropharyngeal exudate or posterior oropharyngeal erythema.   Eyes: Pupils are equal, round, and reactive to light. Conjunctivae and EOM are normal.   Neck: Normal range of motion. Neck supple. No thyromegaly present.   Cardiovascular: Normal rate, regular rhythm, S1 normal, S2 normal, normal heart sounds and intact distal pulses. Exam reveals no gallop and no friction rub.   No murmur heard.  Pulmonary/Chest: Effort normal and breath sounds normal. She has no wheezes. She has no rales.   Abdominal: Soft. Normal appearance and bowel sounds are normal.   Musculoskeletal:        Left hip: Normal.        Lumbar back: Normal.        Right lower leg: She exhibits no edema.        Left lower leg: She exhibits no edema.   Lymphadenopathy:     She has no cervical adenopathy.   Neurological: She is alert and oriented to person, place, and time. No cranial nerve deficit. Gait normal.   Skin: Skin is warm and intact. No rash noted.   Psychiatric: She has a normal mood and affect.       Results for orders placed or performed in visit on 11/01/19   Hemoglobin A1c   Result Value Ref Range    Hemoglobin A1C 6.2 (H) 4.0 - 5.6 %    Estimated Avg Glucose 131 68 - 131 mg/dL   Comprehensive metabolic panel   Result Value Ref Range    " Sodium 141 136 - 145 mmol/L    Potassium 4.1 3.5 - 5.1 mmol/L    Chloride 105 95 - 110 mmol/L    CO2 25 23 - 29 mmol/L    Glucose 127 (H) 70 - 110 mg/dL    BUN, Bld 15 8 - 23 mg/dL    Creatinine 0.8 0.5 - 1.4 mg/dL    Calcium 9.7 8.7 - 10.5 mg/dL    Total Protein 7.7 6.0 - 8.4 g/dL    Albumin 4.4 3.5 - 5.2 g/dL    Total Bilirubin 1.4 (H) 0.1 - 1.0 mg/dL    Alkaline Phosphatase 82 55 - 135 U/L    AST 23 10 - 40 U/L    ALT 18 10 - 44 U/L    Anion Gap 11 8 - 16 mmol/L    eGFR if African American >60.0 >60 mL/min/1.73 m^2    eGFR if non African American >60.0 >60 mL/min/1.73 m^2   Lipid panel   Result Value Ref Range    Cholesterol 176 120 - 199 mg/dL    Triglycerides 159 (H) 30 - 150 mg/dL    HDL 56 40 - 75 mg/dL    LDL Cholesterol 88.2 63.0 - 159.0 mg/dL    Hdl/Cholesterol Ratio 31.8 20.0 - 50.0 %    Total Cholesterol/HDL Ratio 3.1 2.0 - 5.0    Non-HDL Cholesterol 120 mg/dL       Assessment:       1. Preventative health care    2. Controlled type 2 diabetes mellitus without complication, without long-term current use of insulin    3. Hyperlipidemia LDL goal <100    4. Plexopathy        Plan:       Preventative health care    Controlled type 2 diabetes mellitus without complication, without long-term current use of insulin  -     Hemoglobin A1c; Future; Expected date: 05/03/2020  -     Comprehensive metabolic panel; Future; Expected date: 05/03/2020  -     Lipid panel; Future; Expected date: 05/03/2020  -     metFORMIN (GLUCOPHAGE) 500 MG tablet; Take 1 tablet (500 mg total) by mouth 2 (two) times daily with meals.  Dispense: 180 tablet; Refill: 3    Hyperlipidemia LDL goal <100  -     rosuvastatin (CRESTOR) 10 MG tablet; Take 1 tablet (10 mg total) by mouth once daily.  Dispense: 90 tablet; Refill: 3    Plexopathy  -     X-Ray Lumbar Complete With Flex And Ext; Future; Expected date: 11/05/2019        Suspect likely L5 radiculopathy on the left  Consider MRI l-spine after xrays evaluated  continue present meds  Basic  back stretching  F/u 6 months with labs  Boostrix at pharmacy  Counseled on regular exercise, maintenance of a healthy weight, balanced diet rich in fruits/vegetables and lean protein, and avoidance of unhealthy habits like smoking and excessive alcohol intake.

## 2019-11-13 ENCOUNTER — PATIENT OUTREACH (OUTPATIENT)
Dept: ADMINISTRATIVE | Facility: HOSPITAL | Age: 69
End: 2019-11-13

## 2019-11-13 ENCOUNTER — TELEPHONE (OUTPATIENT)
Dept: ADMINISTRATIVE | Facility: HOSPITAL | Age: 69
End: 2019-11-13

## 2020-04-28 ENCOUNTER — PATIENT MESSAGE (OUTPATIENT)
Dept: FAMILY MEDICINE | Facility: CLINIC | Age: 70
End: 2020-04-28

## 2020-05-05 ENCOUNTER — PATIENT MESSAGE (OUTPATIENT)
Dept: ADMINISTRATIVE | Facility: HOSPITAL | Age: 70
End: 2020-05-05

## 2020-05-28 ENCOUNTER — PATIENT MESSAGE (OUTPATIENT)
Dept: FAMILY MEDICINE | Facility: CLINIC | Age: 70
End: 2020-05-28

## 2020-05-28 DIAGNOSIS — Z00.00 PREVENTATIVE HEALTH CARE: ICD-10-CM

## 2020-05-28 DIAGNOSIS — Z12.31 OTHER SCREENING MAMMOGRAM: Primary | ICD-10-CM

## 2020-07-06 ENCOUNTER — HOSPITAL ENCOUNTER (OUTPATIENT)
Dept: RADIOLOGY | Facility: HOSPITAL | Age: 70
Discharge: HOME | End: 2020-07-06
Attending: FAMILY MEDICINE
Payer: MEDICARE

## 2020-07-06 DIAGNOSIS — Z12.31 OTHER SCREENING MAMMOGRAM: ICD-10-CM

## 2020-07-06 DIAGNOSIS — Z12.31 BREAST CANCER SCREENING BY MAMMOGRAM: ICD-10-CM

## 2020-07-06 PROCEDURE — 77067 MAMMO DIGITAL SCREENING BILAT WITH TOMOSYNTHESIS_CAD: ICD-10-PCS | Mod: 26,,, | Performed by: RADIOLOGY

## 2020-07-06 PROCEDURE — 77067 SCR MAMMO BI INCL CAD: CPT | Mod: 26,,, | Performed by: RADIOLOGY

## 2020-07-06 PROCEDURE — 77063 MAMMO DIGITAL SCREENING BILAT WITH TOMOSYNTHESIS_CAD: ICD-10-PCS | Mod: 26,,, | Performed by: RADIOLOGY

## 2020-07-06 PROCEDURE — 77063 BREAST TOMOSYNTHESIS BI: CPT | Mod: 26,,, | Performed by: RADIOLOGY

## 2020-07-06 PROCEDURE — 77067 SCR MAMMO BI INCL CAD: CPT | Mod: TC,PO

## 2020-07-09 ENCOUNTER — OFFICE VISIT (OUTPATIENT)
Dept: FAMILY MEDICINE | Facility: CLINIC | Age: 70
End: 2020-07-09
Payer: MEDICARE

## 2020-07-09 VITALS
SYSTOLIC BLOOD PRESSURE: 134 MMHG | OXYGEN SATURATION: 98 % | WEIGHT: 135.81 LBS | BODY MASS INDEX: 23.18 KG/M2 | TEMPERATURE: 98 F | HEART RATE: 80 BPM | DIASTOLIC BLOOD PRESSURE: 74 MMHG | HEIGHT: 64 IN

## 2020-07-09 DIAGNOSIS — E11.9 CONTROLLED TYPE 2 DIABETES MELLITUS WITHOUT COMPLICATION, WITHOUT LONG-TERM CURRENT USE OF INSULIN: Primary | ICD-10-CM

## 2020-07-09 DIAGNOSIS — E78.5 HYPERLIPIDEMIA LDL GOAL <100: ICD-10-CM

## 2020-07-09 DIAGNOSIS — M19.049 HAND ARTHRITIS: ICD-10-CM

## 2020-07-09 PROCEDURE — 99999 PR PBB SHADOW E&M-EST. PATIENT-LVL III: ICD-10-PCS | Mod: PBBFAC,,, | Performed by: FAMILY MEDICINE

## 2020-07-09 PROCEDURE — 3044F HG A1C LEVEL LT 7.0%: CPT | Mod: CPTII,S$GLB,, | Performed by: FAMILY MEDICINE

## 2020-07-09 PROCEDURE — 3008F BODY MASS INDEX DOCD: CPT | Mod: CPTII,S$GLB,, | Performed by: FAMILY MEDICINE

## 2020-07-09 PROCEDURE — 99214 OFFICE O/P EST MOD 30 MIN: CPT | Mod: S$GLB,,, | Performed by: FAMILY MEDICINE

## 2020-07-09 PROCEDURE — 99214 PR OFFICE/OUTPT VISIT, EST, LEVL IV, 30-39 MIN: ICD-10-PCS | Mod: S$GLB,,, | Performed by: FAMILY MEDICINE

## 2020-07-09 PROCEDURE — 1101F PT FALLS ASSESS-DOCD LE1/YR: CPT | Mod: CPTII,S$GLB,, | Performed by: FAMILY MEDICINE

## 2020-07-09 PROCEDURE — 1101F PR PT FALLS ASSESS DOC 0-1 FALLS W/OUT INJ PAST YR: ICD-10-PCS | Mod: CPTII,S$GLB,, | Performed by: FAMILY MEDICINE

## 2020-07-09 PROCEDURE — 1126F PR PAIN SEVERITY QUANTIFIED, NO PAIN PRESENT: ICD-10-PCS | Mod: S$GLB,,, | Performed by: FAMILY MEDICINE

## 2020-07-09 PROCEDURE — 3008F PR BODY MASS INDEX (BMI) DOCUMENTED: ICD-10-PCS | Mod: CPTII,S$GLB,, | Performed by: FAMILY MEDICINE

## 2020-07-09 PROCEDURE — 1126F AMNT PAIN NOTED NONE PRSNT: CPT | Mod: S$GLB,,, | Performed by: FAMILY MEDICINE

## 2020-07-09 PROCEDURE — 99999 PR PBB SHADOW E&M-EST. PATIENT-LVL III: CPT | Mod: PBBFAC,,, | Performed by: FAMILY MEDICINE

## 2020-07-09 PROCEDURE — 99499 UNLISTED E&M SERVICE: CPT | Mod: S$GLB,,, | Performed by: FAMILY MEDICINE

## 2020-07-09 PROCEDURE — 1159F MED LIST DOCD IN RCRD: CPT | Mod: S$GLB,,, | Performed by: FAMILY MEDICINE

## 2020-07-09 PROCEDURE — 99499 RISK ADDL DX/OHS AUDIT: ICD-10-PCS | Mod: S$GLB,,, | Performed by: FAMILY MEDICINE

## 2020-07-09 PROCEDURE — 1159F PR MEDICATION LIST DOCUMENTED IN MEDICAL RECORD: ICD-10-PCS | Mod: S$GLB,,, | Performed by: FAMILY MEDICINE

## 2020-07-09 PROCEDURE — 3044F PR MOST RECENT HEMOGLOBIN A1C LEVEL <7.0%: ICD-10-PCS | Mod: CPTII,S$GLB,, | Performed by: FAMILY MEDICINE

## 2020-07-09 RX ORDER — DICLOFENAC SODIUM 10 MG/G
2 GEL TOPICAL 4 TIMES DAILY
Qty: 100 G | Refills: 1 | Status: SHIPPED | OUTPATIENT
Start: 2020-07-09

## 2020-07-09 NOTE — PROGRESS NOTES
Subjective:       Patient ID: Eulalia Villarreal is a 69 y.o. female.    Chief Complaint: Follow-up (6 month ) and Headache    Here for annual exam and for 6 month f/u on diabetes    DM2 - taking metformin 500mg BID  HLD - taking Crestor 10mg daily with CoQ 10    Taking tylenol BID for some hand arthritis        Follow-up  Associated symptoms include arthralgias (hands) and headaches. Pertinent negatives include no abdominal pain, chest pain, chills, coughing, fever, nausea, neck pain, numbness, rash, sore throat, vomiting or weakness.   Hip Pain   Pertinent negatives include no numbness.   Headache   Pertinent negatives include no abdominal pain, coughing, dizziness, eye pain, fever, nausea, neck pain, numbness, sore throat, vomiting or weakness.       Past Medical History:   Diagnosis Date    Arthritis     hands    Diabetes mellitus     Herniated disc     L5    Hyperlipidemia        Past Surgical History:   Procedure Laterality Date    ADENOIDECTOMY      BACK SURGERY  1982    discectomy    FLEX[facility]  8/7/[facility]    TONSILLECTOMY      WISDOM TOOTH EXTRACTION         Review of patient's allergies indicates:   Allergen Reactions    Statins-hmg-coa reductase inhibitors Other (See Comments)     Severe pain to her left side    Meperidine      Other reaction(s): hyper    Sulfa (sulfonamide antibiotics) Rash     Other reaction(s): Itching       Social History     Socioeconomic History    Marital status:      Spouse name: Not on file    Number of children: Not on file    Years of education: Not on file    Highest education level: Not on file   Occupational History    Occupation:    Social Needs    Financial resource strain: Not on file    Food insecurity     Worry: Not on file     Inability: Not on file    Transportation needs     Medical: Not on file     Non-medical: Not on file   Tobacco Use    Smoking status: Never Smoker    Smokeless tobacco: Never Used    Substance and Sexual Activity    Alcohol use: No     Comment: rare    Drug use: No    Sexual activity: Never   Lifestyle    Physical activity     Days per week: Not on file     Minutes per session: Not on file    Stress: Not on file   Relationships    Social connections     Talks on phone: Not on file     Gets together: Not on file     Attends [employer] service: Not on file     Active member of club or organization: Not on file     Attends meetings of clubs or organizations: Not on file     Relationship status: Not on file   Other Topics Concern    Not on file   Social History Narrative    Not on file       Current Outpatient Medications on File Prior to Visit   Medication Sig Dispense Refill    ascorbic acid (VITAMIN C) 500 MG tablet Take 1 tablet by mouth Daily.      glucosamine-D3-Boswellia serr (OSTEO BI-FLEX, 5-LOXIN,) 1,500-400-100 mg-unit-mg Tab Take by mouth.      metFORMIN (GLUCOPHAGE) 500 MG tablet Take 1 tablet (500 mg total) by mouth 2 (two) times daily with meals. 180 tablet 3    rosuvastatin (CRESTOR) 10 MG tablet Take 1 tablet (10 mg total) by mouth once daily. 90 tablet 3    UBIDECARENONE (CO Q-10 ORAL) Take by mouth.      vitamin D 185 MG Tab Take 2,000 mg by mouth once daily.        VITAMIN E ACETATE (VITAMIN E ORAL) Take 1 capsule by mouth Daily.       No current facility-administered medications on file prior to visit.        Family History   Problem Relation Age of Onset    Diabetes Mother     Coronary artery disease Mother     Diabetes Son     Cancer Maternal Aunt     Cerebral aneurysm Father     Coronary artery disease Sister     Coronary artery disease Brother     Heart disease Neg Hx        Review of Systems   Constitutional: Negative for appetite change, chills, fever and unexpected weight change.   HENT: Negative for sore throat and trouble swallowing.    Eyes: Negative for pain and visual disturbance.   Respiratory: Negative for cough, shortness of breath and  "wheezing.    Cardiovascular: Negative for chest pain and palpitations.   Gastrointestinal: Negative for abdominal pain, blood in stool, diarrhea, nausea and vomiting.   Genitourinary: Negative for difficulty urinating, dysuria and hematuria.   Musculoskeletal: Positive for arthralgias (hands). Negative for gait problem and neck pain.   Skin: Negative for rash and wound.   Neurological: Positive for headaches. Negative for dizziness, weakness and numbness.   Hematological: Negative for adenopathy.   Psychiatric/Behavioral: Negative for dysphoric mood.       Objective:      BP (!) 156/82 (BP Location: Left arm, Patient Position: Sitting)   Pulse 80   Temp 98.1 °F (36.7 °C) (Oral)   Ht 5' 4" (1.626 m)   Wt 61.6 kg (135 lb 12.9 oz)   SpO2 98%   BMI 23.31 kg/m²   Physical Exam  Constitutional:       Appearance: Normal appearance. She is well-developed.   HENT:      Head: Normocephalic.      Mouth/Throat:      Pharynx: No oropharyngeal exudate or posterior oropharyngeal erythema.   Eyes:      Conjunctiva/sclera: Conjunctivae normal.      Pupils: Pupils are equal, round, and reactive to light.   Neck:      Musculoskeletal: Normal range of motion and neck supple.      Thyroid: No thyromegaly.   Cardiovascular:      Rate and Rhythm: Normal rate and regular rhythm.      Heart sounds: Normal heart sounds, S1 normal and S2 normal. No murmur. No friction rub. No gallop.    Pulmonary:      Effort: Pulmonary effort is normal.      Breath sounds: Normal breath sounds. No wheezing or rales.   Abdominal:      General: Bowel sounds are normal.      Palpations: Abdomen is soft.   Musculoskeletal:      Left hip: Normal.      Lumbar back: Normal.      Right lower leg: No edema.      Left lower leg: No edema.   Lymphadenopathy:      Cervical: No cervical adenopathy.   Skin:     General: Skin is warm.      Findings: No rash.   Neurological:      Mental Status: She is alert and oriented to person, place, and time.      Cranial Nerves: " No cranial nerve deficit.      Gait: Gait normal.       some crepitus in bilateral thumbs    Results for orders placed or performed in visit on 07/06/20   Hemoglobin A1c   Result Value Ref Range    Hemoglobin A1C 6.1 (H) 4.0 - 5.6 %    Estimated Avg Glucose 128 68 - 131 mg/dL   Comprehensive metabolic panel   Result Value Ref Range    Sodium 141 136 - 145 mmol/L    Potassium 4.0 3.5 - 5.1 mmol/L    Chloride 106 95 - 110 mmol/L    CO2 30 (H) 23 - 29 mmol/L    Glucose 121 (H) 70 - 110 mg/dL    BUN, Bld 16 8 - 23 mg/dL    Creatinine 0.8 0.5 - 1.4 mg/dL    Calcium 9.1 8.7 - 10.5 mg/dL    Total Protein 7.4 6.0 - 8.4 g/dL    Albumin 4.0 3.5 - 5.2 g/dL    Total Bilirubin 1.0 0.1 - 1.0 mg/dL    Alkaline Phosphatase 82 55 - 135 U/L    AST 19 10 - 40 U/L    ALT 16 10 - 44 U/L    Anion Gap 5 (L) 8 - 16 mmol/L    eGFR if African American >60.0 >60 mL/min/1.73 m^2    eGFR if non African American >60.0 >60 mL/min/1.73 m^2   Lipid panel   Result Value Ref Range    Cholesterol 180 120 - 199 mg/dL    Triglycerides 118 30 - 150 mg/dL    HDL 53 40 - 75 mg/dL    LDL Cholesterol 103.4 63.0 - 159.0 mg/dL    Hdl/Cholesterol Ratio 29.4 20.0 - 50.0 %    Total Cholesterol/HDL Ratio 3.4 2.0 - 5.0    Non-HDL Cholesterol 127 mg/dL       Assessment:       1. Controlled type 2 diabetes mellitus without complication, without long-term current use of insulin    2. Hyperlipidemia LDL goal <100    3. Hand arthritis        Plan:       Controlled type 2 diabetes mellitus without complication, without long-term current use of insulin  -     Hemoglobin A1C; Future; Expected date: 01/05/2021  -     Comprehensive metabolic panel; Future; Expected date: 01/05/2021  -     Microalbumin/creatinine urine ratio; Future; Expected date: 01/05/2021    Hyperlipidemia LDL goal <100    Hand arthritis  -     diclofenac sodium (VOLTAREN) 1 % Gel; Apply 2 g topically 4 (four) times daily. To hands as needed  Dispense: 100 g; Refill: 1          Doing well  continue  present meds  F/u 6 months with labs    Counseled on regular exercise, maintenance of a healthy weight, balanced diet rich in fruits/vegetables and lean protein, and avoidance of unhealthy habits like smoking and excessive alcohol intake.

## 2020-07-20 LAB
LEFT EYE DM RETINOPATHY: NEGATIVE
RIGHT EYE DM RETINOPATHY: NEGATIVE

## 2020-08-31 ENCOUNTER — TELEPHONE (OUTPATIENT)
Dept: OTOLARYNGOLOGY | Facility: CLINIC | Age: 70
End: 2020-08-31

## 2020-08-31 ENCOUNTER — PATIENT MESSAGE (OUTPATIENT)
Dept: FAMILY MEDICINE | Facility: CLINIC | Age: 70
End: 2020-08-31

## 2020-08-31 DIAGNOSIS — H60.91 OTITIS EXTERNA OF RIGHT EAR, UNSPECIFIED CHRONICITY, UNSPECIFIED TYPE: Primary | ICD-10-CM

## 2020-08-31 NOTE — TELEPHONE ENCOUNTER
LMOV to call clinic to reschedule appt. Advised the pt can reschedule with any of the operators that answer the phone.

## 2020-09-02 ENCOUNTER — PATIENT OUTREACH (OUTPATIENT)
Dept: ADMINISTRATIVE | Facility: HOSPITAL | Age: 70
End: 2020-09-02

## 2020-09-14 ENCOUNTER — PATIENT OUTREACH (OUTPATIENT)
Dept: ADMINISTRATIVE | Facility: OTHER | Age: 70
End: 2020-09-14

## 2020-09-14 NOTE — PROGRESS NOTES
Health Maintenance Due   Topic Date Due    Shingles Vaccine (1 of 2) 07/20/2000    Influenza Vaccine (1) 08/01/2020     Updates were requested from care everywhere.  Chart was reviewed for overdue Proactive [facility] Encounters ([facility]) topics (CRS, Breast Cancer Screening, Eye exam)  Health Maintenance has been updated.  LINKS immunization registry triggered.  Immunizations were reconciled.

## 2020-09-15 ENCOUNTER — OFFICE VISIT (OUTPATIENT)
Dept: OTOLARYNGOLOGY | Facility: CLINIC | Age: 70
End: 2020-09-15
Payer: MEDICARE

## 2020-09-15 VITALS — WEIGHT: 135.81 LBS | BODY MASS INDEX: 23.18 KG/M2 | HEIGHT: 64 IN

## 2020-09-15 DIAGNOSIS — H61.21 RIGHT EAR IMPACTED CERUMEN: Primary | ICD-10-CM

## 2020-09-15 DIAGNOSIS — H60.91 OTITIS EXTERNA OF RIGHT EAR, UNSPECIFIED CHRONICITY, UNSPECIFIED TYPE: ICD-10-CM

## 2020-09-15 PROCEDURE — 99203 OFFICE O/P NEW LOW 30 MIN: CPT | Mod: 25,S$GLB,, | Performed by: NURSE PRACTITIONER

## 2020-09-15 PROCEDURE — 1101F PT FALLS ASSESS-DOCD LE1/YR: CPT | Mod: CPTII,S$GLB,, | Performed by: NURSE PRACTITIONER

## 2020-09-15 PROCEDURE — 1159F MED LIST DOCD IN RCRD: CPT | Mod: S$GLB,,, | Performed by: NURSE PRACTITIONER

## 2020-09-15 PROCEDURE — 1159F PR MEDICATION LIST DOCUMENTED IN MEDICAL RECORD: ICD-10-PCS | Mod: S$GLB,,, | Performed by: NURSE PRACTITIONER

## 2020-09-15 PROCEDURE — 1126F PR PAIN SEVERITY QUANTIFIED, NO PAIN PRESENT: ICD-10-PCS | Mod: S$GLB,,, | Performed by: NURSE PRACTITIONER

## 2020-09-15 PROCEDURE — 99999 PR PBB SHADOW E&M-EST. PATIENT-LVL III: ICD-10-PCS | Mod: PBBFAC,,, | Performed by: NURSE PRACTITIONER

## 2020-09-15 PROCEDURE — 69210 REMOVE IMPACTED EAR WAX UNI: CPT | Mod: S$GLB,,, | Performed by: NURSE PRACTITIONER

## 2020-09-15 PROCEDURE — 99203 PR OFFICE/OUTPT VISIT, NEW, LEVL III, 30-44 MIN: ICD-10-PCS | Mod: 25,S$GLB,, | Performed by: NURSE PRACTITIONER

## 2020-09-15 PROCEDURE — 3008F BODY MASS INDEX DOCD: CPT | Mod: CPTII,S$GLB,, | Performed by: NURSE PRACTITIONER

## 2020-09-15 PROCEDURE — 99999 PR PBB SHADOW E&M-EST. PATIENT-LVL III: CPT | Mod: PBBFAC,,, | Performed by: NURSE PRACTITIONER

## 2020-09-15 PROCEDURE — 3008F PR BODY MASS INDEX (BMI) DOCUMENTED: ICD-10-PCS | Mod: CPTII,S$GLB,, | Performed by: NURSE PRACTITIONER

## 2020-09-15 PROCEDURE — 1126F AMNT PAIN NOTED NONE PRSNT: CPT | Mod: S$GLB,,, | Performed by: NURSE PRACTITIONER

## 2020-09-15 PROCEDURE — 1101F PR PT FALLS ASSESS DOC 0-1 FALLS W/OUT INJ PAST YR: ICD-10-PCS | Mod: CPTII,S$GLB,, | Performed by: NURSE PRACTITIONER

## 2020-09-15 PROCEDURE — 69210 PR REMOVAL IMPACTED CERUMEN REQUIRING INSTRUMENTATION, UNILATERAL: ICD-10-PCS | Mod: S$GLB,,, | Performed by: NURSE PRACTITIONER

## 2020-09-15 RX ORDER — CLOTRIMAZOLE AND BETAMETHASONE DIPROPIONATE 10; .64 MG/G; MG/G
CREAM TOPICAL 2 TIMES DAILY
Qty: 15 G | Refills: 0 | Status: SHIPPED | OUTPATIENT
Start: 2020-09-15 | End: 2020-10-05

## 2020-09-15 NOTE — PROGRESS NOTES
Subjective:       Patient ID: Eulalia Villarreal is a 70 y.o. female.    Chief Complaint: Ear Drainage (right ear) and Ear Fullness (off and on)    HPI   Patient was last seen by me 7.5 years ago for right AOE. Patient returns today for right ear issues X 9-12 months. Right ear itches, feels wet, then dry & crusty, off and on X several months. No acute otalgia. No hearing impairment.     Review of Systems   Constitutional: Negative.    HENT: Positive for ear discharge.    Eyes: Negative.    Respiratory: Negative.    Cardiovascular: Negative.    Gastrointestinal: Negative.    Musculoskeletal: Negative.    Integumentary:  Negative.   Neurological: Negative.    Hematological: Negative.    Psychiatric/Behavioral: Negative.          Objective:      Physical Exam  Vitals signs and nursing note reviewed.   Constitutional:       General: She is not in acute distress.     Appearance: She is well-developed. She is not ill-appearing or diaphoretic.   HENT:      Head: Normocephalic and atraumatic.      Right Ear: Hearing, tympanic membrane, ear canal and external ear normal. No middle ear effusion. Tympanic membrane is not erythematous.      Left Ear: Hearing, tympanic membrane, ear canal and external ear normal.  No middle ear effusion. Tympanic membrane is not erythematous.      Nose: Nose normal.      Mouth/Throat:      Pharynx: Uvula midline.   Eyes:      General: Lids are normal. No scleral icterus.        Right eye: No discharge.         Left eye: No discharge.   Neck:      Musculoskeletal: Normal range of motion and neck supple.      Trachea: Trachea normal. No tracheal deviation.   Cardiovascular:      Rate and Rhythm: Normal rate.   Pulmonary:      Effort: Pulmonary effort is normal. No respiratory distress.      Breath sounds: No stridor. No wheezing.   Musculoskeletal: Normal range of motion.   Skin:     General: Skin is warm and dry.      Coloration: Skin is not pale.   Neurological:      Mental Status: She is alert and  oriented to person, place, and time.      Coordination: Coordination normal.      Gait: Gait normal.   Psychiatric:         Speech: Speech normal.         Behavior: Behavior normal. Behavior is cooperative.         Thought Content: Thought content normal.         Judgment: Judgment normal.         SEPARATE PROCEDURE IN OFFICE:   Procedure: Removal of impacted cerumen, RIGHT   Pre Procedure Diagnosis: Cerumen Impaction   Post Procedure Diagnosis: Cerumen Impaction   Verbal informed consent in regards to risk of trauma to ear canal, ear drum or hearing, discomfort during procedure and/or inability to remove cerumen impaction in one session or unforeseen events or complications.   No anesthesia.     Procedure in detail:   Ear canal visualized bilateral with appropriate size ear speculum utilizing Operating Head Binocular Otomicroscope   Utilizing the following:  Ring curet, delicate alligator forceps, and/or suction cannula was used. The impacted cerumen of the ear canals was removed atraumatically. The TM and EAC were then inspected and found to be clear of wax. See description of TMs/EACs in PE above.   Complications: No   Condition: Improved/Good     Assessment:       1. Otitis externa of right ear, unspecified chronicity, unspecified type        Plan:     Right cerumen impaction removed. No evidence of acute bacterial OE. No exudate or liquid to culture. Some scaling and inflammation of auditory meatus. Lotrisome cream to auditory meatus BID X 20 days. Call back if symptoms worsen or persist.   When ear feels damp, use 50/50 solution of isopropyl alcohol and white vinegar.     Return to clinic as needed for further ENT concerns

## 2020-09-15 NOTE — LETTER
September 15, 2020      Sunny Gee MD  [address] [facility] [address] [place] [address]           [facility]  [address] [place] [address]  Phone: [phone]  Fax: [phone]          Patient: Eulalia Villarreal   MR Number: [id number]   YOB: 1950   Date of Visit: 9/15/2020       Dear Dr. Sunny Gee:    Thank you for referring Eulalia Villarreal to me for evaluation. Attached you will find relevant portions of my assessment and plan of care.    If you have questions, please do not hesitate to call me. I look forward to following Eulalia Villarreal along with you.    Sincerely,    Angela Sebastian NP    Enclosure  CC:  No Recipients    If you would like to receive this communication electronically, please contact [email or web address] or [phone] to request more information on [employer] access.    For providers and/or their staff who would like to refer a patient to [facility], please contact us through our one-stop-shop provider referral line, [facility] , at [phone].    If you feel you have received this communication in error or would no longer like to receive these types of communications, please e-mail [email or web address]

## 2020-10-16 ENCOUNTER — PES CALL (OUTPATIENT)
Dept: ADMINISTRATIVE | Facility: CLINIC | Age: 70
End: 2020-10-16

## 2020-12-21 DIAGNOSIS — E11.9 CONTROLLED TYPE 2 DIABETES MELLITUS WITHOUT COMPLICATION, WITHOUT LONG-TERM CURRENT USE OF INSULIN: ICD-10-CM

## 2020-12-21 DIAGNOSIS — E78.5 HYPERLIPIDEMIA LDL GOAL <100: ICD-10-CM

## 2020-12-21 RX ORDER — METFORMIN HYDROCHLORIDE 500 MG/1
500 TABLET ORAL 2 TIMES DAILY WITH MEALS
Qty: 180 TABLET | Refills: 3 | Status: CANCELLED | OUTPATIENT
Start: 2020-12-21

## 2020-12-21 RX ORDER — ROSUVASTATIN CALCIUM 10 MG/1
10 TABLET, COATED ORAL DAILY
Qty: 90 TABLET | Refills: 3 | Status: CANCELLED | OUTPATIENT
Start: 2020-12-21 | End: 2021-12-21

## 2020-12-21 NOTE — TELEPHONE ENCOUNTER
Care Due:                  Date            Visit Type   Department     Provider  --------------------------------------------------------------------------------                                ESTABLISHED   [facility]  Last Visit: 07-      PATIENT      MEDICINE       EDWIN ORWE                              VIRTUAL      [facility]  Next Visit: 01-      VISIT        MEDICINE       EDWIN BO                                                            Last  Test          Frequency    Reason                     Performed    Due Date  --------------------------------------------------------------------------------    HBA1C.......  6 months...  metFORMIN................  07- 01-    Powered by [employer]. Reference number: [id number]. 12/21/2020 11:45:07 AM   CST

## 2021-01-04 ENCOUNTER — LAB VISIT (OUTPATIENT)
Dept: LAB | Facility: HOSPITAL | Age: 71
End: 2021-01-04
Attending: FAMILY MEDICINE
Payer: MEDICARE

## 2021-01-04 DIAGNOSIS — E11.9 CONTROLLED TYPE 2 DIABETES MELLITUS WITHOUT COMPLICATION, WITHOUT LONG-TERM CURRENT USE OF INSULIN: ICD-10-CM

## 2021-01-04 LAB
ALBUMIN/CREAT UR: 12.5 UG/MG (ref 0–30)
CREAT UR-MCNC: 48 MG/DL (ref 15–325)
MICROALBUMIN UR DL<=1MG/L-MCNC: 6 UG/ML

## 2021-01-04 PROCEDURE — 82043 UR ALBUMIN QUANTITATIVE: CPT

## 2021-01-04 PROCEDURE — 82570 ASSAY OF URINE CREATININE: CPT

## 2021-01-06 ENCOUNTER — PATIENT MESSAGE (OUTPATIENT)
Dept: FAMILY MEDICINE | Facility: CLINIC | Age: 71
End: 2021-01-06

## 2021-01-06 DIAGNOSIS — E78.5 HYPERLIPIDEMIA LDL GOAL <100: Primary | ICD-10-CM

## 2021-01-11 ENCOUNTER — OFFICE VISIT (OUTPATIENT)
Dept: FAMILY MEDICINE | Facility: CLINIC | Age: 71
End: 2021-01-11
Payer: MEDICARE

## 2021-01-11 VITALS
OXYGEN SATURATION: 98 % | DIASTOLIC BLOOD PRESSURE: 74 MMHG | HEIGHT: 64 IN | BODY MASS INDEX: 23.34 KG/M2 | HEART RATE: 78 BPM | WEIGHT: 136.69 LBS | SYSTOLIC BLOOD PRESSURE: 132 MMHG

## 2021-01-11 DIAGNOSIS — E78.5 HYPERLIPIDEMIA LDL GOAL <100: ICD-10-CM

## 2021-01-11 DIAGNOSIS — E11.9 CONTROLLED TYPE 2 DIABETES MELLITUS WITHOUT COMPLICATION, WITHOUT LONG-TERM CURRENT USE OF INSULIN: Primary | ICD-10-CM

## 2021-01-11 DIAGNOSIS — M19.049 HAND ARTHRITIS: ICD-10-CM

## 2021-01-11 PROCEDURE — 99999 PR PBB SHADOW E&M-EST. PATIENT-LVL III: ICD-10-PCS | Mod: PBBFAC,,, | Performed by: FAMILY MEDICINE

## 2021-01-11 PROCEDURE — 99499 UNLISTED E&M SERVICE: CPT | Mod: S$GLB,,, | Performed by: FAMILY MEDICINE

## 2021-01-11 PROCEDURE — 3288F FALL RISK ASSESSMENT DOCD: CPT | Mod: CPTII,S$GLB,, | Performed by: FAMILY MEDICINE

## 2021-01-11 PROCEDURE — 3008F PR BODY MASS INDEX (BMI) DOCUMENTED: ICD-10-PCS | Mod: CPTII,S$GLB,, | Performed by: FAMILY MEDICINE

## 2021-01-11 PROCEDURE — 3044F HG A1C LEVEL LT 7.0%: CPT | Mod: CPTII,S$GLB,, | Performed by: FAMILY MEDICINE

## 2021-01-11 PROCEDURE — 1126F PR PAIN SEVERITY QUANTIFIED, NO PAIN PRESENT: ICD-10-PCS | Mod: S$GLB,,, | Performed by: FAMILY MEDICINE

## 2021-01-11 PROCEDURE — 3008F BODY MASS INDEX DOCD: CPT | Mod: CPTII,S$GLB,, | Performed by: FAMILY MEDICINE

## 2021-01-11 PROCEDURE — 1101F PT FALLS ASSESS-DOCD LE1/YR: CPT | Mod: CPTII,S$GLB,, | Performed by: FAMILY MEDICINE

## 2021-01-11 PROCEDURE — 99499 RISK ADDL DX/OHS AUDIT: ICD-10-PCS | Mod: S$GLB,,, | Performed by: FAMILY MEDICINE

## 2021-01-11 PROCEDURE — 3288F PR FALLS RISK ASSESSMENT DOCUMENTED: ICD-10-PCS | Mod: CPTII,S$GLB,, | Performed by: FAMILY MEDICINE

## 2021-01-11 PROCEDURE — 99999 PR PBB SHADOW E&M-EST. PATIENT-LVL III: CPT | Mod: PBBFAC,,, | Performed by: FAMILY MEDICINE

## 2021-01-11 PROCEDURE — 3044F PR MOST RECENT HEMOGLOBIN A1C LEVEL <7.0%: ICD-10-PCS | Mod: CPTII,S$GLB,, | Performed by: FAMILY MEDICINE

## 2021-01-11 PROCEDURE — 99214 OFFICE O/P EST MOD 30 MIN: CPT | Mod: S$GLB,,, | Performed by: FAMILY MEDICINE

## 2021-01-11 PROCEDURE — 99214 PR OFFICE/OUTPT VISIT, EST, LEVL IV, 30-39 MIN: ICD-10-PCS | Mod: S$GLB,,, | Performed by: FAMILY MEDICINE

## 2021-01-11 PROCEDURE — 1101F PR PT FALLS ASSESS DOC 0-1 FALLS W/OUT INJ PAST YR: ICD-10-PCS | Mod: CPTII,S$GLB,, | Performed by: FAMILY MEDICINE

## 2021-01-11 PROCEDURE — 1159F PR MEDICATION LIST DOCUMENTED IN MEDICAL RECORD: ICD-10-PCS | Mod: S$GLB,,, | Performed by: FAMILY MEDICINE

## 2021-01-11 PROCEDURE — 1159F MED LIST DOCD IN RCRD: CPT | Mod: S$GLB,,, | Performed by: FAMILY MEDICINE

## 2021-01-11 PROCEDURE — 1126F AMNT PAIN NOTED NONE PRSNT: CPT | Mod: S$GLB,,, | Performed by: FAMILY MEDICINE

## 2021-01-11 RX ORDER — METFORMIN HYDROCHLORIDE 500 MG/1
500 TABLET ORAL 2 TIMES DAILY WITH MEALS
Qty: 180 TABLET | Refills: 3 | Status: SHIPPED | OUTPATIENT
Start: 2021-01-11 | End: 2022-01-19 | Stop reason: SDUPTHER

## 2021-01-11 RX ORDER — ROSUVASTATIN CALCIUM 10 MG/1
10 TABLET, COATED ORAL DAILY
Qty: 90 TABLET | Refills: 3 | Status: SHIPPED | OUTPATIENT
Start: 2021-01-11 | End: 2022-01-19 | Stop reason: SDUPTHER

## 2021-01-14 ENCOUNTER — IMMUNIZATION (OUTPATIENT)
Dept: PHARMACY | Facility: CLINIC | Age: 71
End: 2021-01-14
Payer: MEDICARE

## 2021-01-14 DIAGNOSIS — Z23 NEED FOR VACCINATION: ICD-10-CM

## 2021-02-11 ENCOUNTER — IMMUNIZATION (OUTPATIENT)
Dept: PHARMACY | Facility: CLINIC | Age: 71
End: 2021-02-11
Payer: MEDICARE

## 2021-02-11 DIAGNOSIS — Z23 NEED FOR VACCINATION: Primary | ICD-10-CM

## 2021-06-25 ENCOUNTER — PATIENT MESSAGE (OUTPATIENT)
Dept: FAMILY MEDICINE | Facility: CLINIC | Age: 71
End: 2021-06-25

## 2021-06-25 DIAGNOSIS — Z12.31 OTHER SCREENING MAMMOGRAM: Primary | ICD-10-CM

## 2021-07-07 ENCOUNTER — PATIENT MESSAGE (OUTPATIENT)
Dept: ADMINISTRATIVE | Facility: HOSPITAL | Age: 71
End: 2021-07-07

## 2021-07-08 ENCOUNTER — PATIENT OUTREACH (OUTPATIENT)
Dept: ADMINISTRATIVE | Facility: HOSPITAL | Age: 71
End: 2021-07-08

## 2021-07-12 ENCOUNTER — LAB VISIT (OUTPATIENT)
Dept: LAB | Facility: HOSPITAL | Age: 71
End: 2021-07-12
Attending: FAMILY MEDICINE
Payer: MEDICARE

## 2021-07-12 DIAGNOSIS — E11.9 CONTROLLED TYPE 2 DIABETES MELLITUS WITHOUT COMPLICATION, WITHOUT LONG-TERM CURRENT USE OF INSULIN: ICD-10-CM

## 2021-07-12 LAB
ALBUMIN SERPL BCP-MCNC: 4.1 G/DL (ref 3.5–5.2)
ALP SERPL-CCNC: 72 U/L (ref 55–135)
ALT SERPL W/O P-5'-P-CCNC: 15 U/L (ref 10–44)
ANION GAP SERPL CALC-SCNC: 5 MMOL/L (ref 8–16)
AST SERPL-CCNC: 19 U/L (ref 10–40)
BILIRUB SERPL-MCNC: 1.1 MG/DL (ref 0.1–1)
BUN SERPL-MCNC: 13 MG/DL (ref 8–23)
CALCIUM SERPL-MCNC: 9.6 MG/DL (ref 8.7–10.5)
CHLORIDE SERPL-SCNC: 105 MMOL/L (ref 95–110)
CHOLEST SERPL-MCNC: 186 MG/DL (ref 120–199)
CHOLEST/HDLC SERPL: 3.6 {RATIO} (ref 2–5)
CO2 SERPL-SCNC: 31 MMOL/L (ref 23–29)
CREAT SERPL-MCNC: 0.8 MG/DL (ref 0.5–1.4)
EST. GFR  (AFRICAN AMERICAN): >60 ML/MIN/1.73 M^2
EST. GFR  (NON AFRICAN AMERICAN): >60 ML/MIN/1.73 M^2
ESTIMATED AVG GLUCOSE: 137 MG/DL (ref 68–131)
GLUCOSE SERPL-MCNC: 125 MG/DL (ref 70–110)
HBA1C MFR BLD: 6.4 % (ref 4–5.6)
HDLC SERPL-MCNC: 51 MG/DL (ref 40–75)
HDLC SERPL: 27.4 % (ref 20–50)
LDLC SERPL CALC-MCNC: 94.4 MG/DL (ref 63–159)
NONHDLC SERPL-MCNC: 135 MG/DL
POTASSIUM SERPL-SCNC: 4.8 MMOL/L (ref 3.5–5.1)
PROT SERPL-MCNC: 7.2 G/DL (ref 6–8.4)
SODIUM SERPL-SCNC: 141 MMOL/L (ref 136–145)
TRIGL SERPL-MCNC: 203 MG/DL (ref 30–150)

## 2021-07-12 PROCEDURE — 80061 LIPID PANEL: CPT | Performed by: FAMILY MEDICINE

## 2021-07-12 PROCEDURE — 80053 COMPREHEN METABOLIC PANEL: CPT | Performed by: FAMILY MEDICINE

## 2021-07-12 PROCEDURE — 83036 HEMOGLOBIN GLYCOSYLATED A1C: CPT | Performed by: FAMILY MEDICINE

## 2021-07-12 PROCEDURE — 36415 COLL VENOUS BLD VENIPUNCTURE: CPT | Mod: PO | Performed by: FAMILY MEDICINE

## 2021-07-19 ENCOUNTER — OFFICE VISIT (OUTPATIENT)
Dept: FAMILY MEDICINE | Facility: CLINIC | Age: 71
End: 2021-07-19
Payer: MEDICARE

## 2021-07-19 ENCOUNTER — HOSPITAL ENCOUNTER (OUTPATIENT)
Dept: RADIOLOGY | Facility: HOSPITAL | Age: 71
Discharge: HOME | End: 2021-07-19
Attending: FAMILY MEDICINE
Payer: MEDICARE

## 2021-07-19 VITALS
HEART RATE: 88 BPM | SYSTOLIC BLOOD PRESSURE: 134 MMHG | BODY MASS INDEX: 23.34 KG/M2 | TEMPERATURE: 99 F | HEIGHT: 64 IN | DIASTOLIC BLOOD PRESSURE: 70 MMHG | WEIGHT: 136.69 LBS | RESPIRATION RATE: 18 BRPM

## 2021-07-19 DIAGNOSIS — Z12.31 OTHER SCREENING MAMMOGRAM: ICD-10-CM

## 2021-07-19 DIAGNOSIS — L98.9 FACIAL LESION: ICD-10-CM

## 2021-07-19 DIAGNOSIS — K30 INDIGESTION: ICD-10-CM

## 2021-07-19 DIAGNOSIS — E78.5 HYPERLIPIDEMIA LDL GOAL <100: ICD-10-CM

## 2021-07-19 DIAGNOSIS — R51.9 CHRONIC DAILY HEADACHE: ICD-10-CM

## 2021-07-19 DIAGNOSIS — E11.9 CONTROLLED TYPE 2 DIABETES MELLITUS WITHOUT COMPLICATION, WITHOUT LONG-TERM CURRENT USE OF INSULIN: Primary | ICD-10-CM

## 2021-07-19 PROCEDURE — 99999 PR PBB SHADOW E&M-EST. PATIENT-LVL III: ICD-10-PCS | Mod: PBBFAC,,, | Performed by: FAMILY MEDICINE

## 2021-07-19 PROCEDURE — 77067 SCR MAMMO BI INCL CAD: CPT | Mod: 26,,, | Performed by: RADIOLOGY

## 2021-07-19 PROCEDURE — 3044F HG A1C LEVEL LT 7.0%: CPT | Mod: CPTII,S$GLB,, | Performed by: FAMILY MEDICINE

## 2021-07-19 PROCEDURE — 99999 PR PBB SHADOW E&M-EST. PATIENT-LVL III: CPT | Mod: PBBFAC,,, | Performed by: FAMILY MEDICINE

## 2021-07-19 PROCEDURE — 99214 PR OFFICE/OUTPT VISIT, EST, LEVL IV, 30-39 MIN: ICD-10-PCS | Mod: S$GLB,,, | Performed by: FAMILY MEDICINE

## 2021-07-19 PROCEDURE — 77063 BREAST TOMOSYNTHESIS BI: CPT | Mod: 26,,, | Performed by: RADIOLOGY

## 2021-07-19 PROCEDURE — 3008F BODY MASS INDEX DOCD: CPT | Mod: CPTII,S$GLB,, | Performed by: FAMILY MEDICINE

## 2021-07-19 PROCEDURE — 3008F PR BODY MASS INDEX (BMI) DOCUMENTED: ICD-10-PCS | Mod: CPTII,S$GLB,, | Performed by: FAMILY MEDICINE

## 2021-07-19 PROCEDURE — 3044F PR MOST RECENT HEMOGLOBIN A1C LEVEL <7.0%: ICD-10-PCS | Mod: CPTII,S$GLB,, | Performed by: FAMILY MEDICINE

## 2021-07-19 PROCEDURE — 3288F PR FALLS RISK ASSESSMENT DOCUMENTED: ICD-10-PCS | Mod: CPTII,S$GLB,, | Performed by: FAMILY MEDICINE

## 2021-07-19 PROCEDURE — 3288F FALL RISK ASSESSMENT DOCD: CPT | Mod: CPTII,S$GLB,, | Performed by: FAMILY MEDICINE

## 2021-07-19 PROCEDURE — 1159F PR MEDICATION LIST DOCUMENTED IN MEDICAL RECORD: ICD-10-PCS | Mod: CPTII,S$GLB,, | Performed by: FAMILY MEDICINE

## 2021-07-19 PROCEDURE — 1159F MED LIST DOCD IN RCRD: CPT | Mod: CPTII,S$GLB,, | Performed by: FAMILY MEDICINE

## 2021-07-19 PROCEDURE — 1125F PR PAIN SEVERITY QUANTIFIED, PAIN PRESENT: ICD-10-PCS | Mod: CPTII,S$GLB,, | Performed by: FAMILY MEDICINE

## 2021-07-19 PROCEDURE — 99214 OFFICE O/P EST MOD 30 MIN: CPT | Mod: S$GLB,,, | Performed by: FAMILY MEDICINE

## 2021-07-19 PROCEDURE — 1101F PT FALLS ASSESS-DOCD LE1/YR: CPT | Mod: CPTII,S$GLB,, | Performed by: FAMILY MEDICINE

## 2021-07-19 PROCEDURE — 77067 MAMMO DIGITAL SCREENING BILAT WITH TOMO: ICD-10-PCS | Mod: 26,,, | Performed by: RADIOLOGY

## 2021-07-19 PROCEDURE — 1125F AMNT PAIN NOTED PAIN PRSNT: CPT | Mod: CPTII,S$GLB,, | Performed by: FAMILY MEDICINE

## 2021-07-19 PROCEDURE — 77063 MAMMO DIGITAL SCREENING BILAT WITH TOMO: ICD-10-PCS | Mod: 26,,, | Performed by: RADIOLOGY

## 2021-07-19 PROCEDURE — 77067 SCR MAMMO BI INCL CAD: CPT | Mod: TC,PO

## 2021-07-19 PROCEDURE — 1101F PR PT FALLS ASSESS DOC 0-1 FALLS W/OUT INJ PAST YR: ICD-10-PCS | Mod: CPTII,S$GLB,, | Performed by: FAMILY MEDICINE

## 2021-07-19 RX ORDER — TIZANIDINE 2 MG/1
4 TABLET ORAL NIGHTLY PRN
Qty: 30 TABLET | Refills: 1 | Status: SHIPPED | OUTPATIENT
Start: 2021-07-19 | End: 2021-08-29

## 2021-07-28 ENCOUNTER — PATIENT OUTREACH (OUTPATIENT)
Dept: ADMINISTRATIVE | Facility: OTHER | Age: 71
End: 2021-07-28

## 2021-07-30 ENCOUNTER — OFFICE VISIT (OUTPATIENT)
Dept: OPTOMETRY | Facility: CLINIC | Age: 71
End: 2021-07-30
Payer: MEDICARE

## 2021-07-30 DIAGNOSIS — H52.7 REFRACTIVE ERROR: ICD-10-CM

## 2021-07-30 DIAGNOSIS — E11.9 TYPE 2 DIABETES MELLITUS WITHOUT RETINOPATHY: Primary | ICD-10-CM

## 2021-07-30 DIAGNOSIS — H25.13 NUCLEAR SCLEROSIS OF BOTH EYES: ICD-10-CM

## 2021-07-30 DIAGNOSIS — H04.123 BILATERAL DRY EYES: ICD-10-CM

## 2021-07-30 PROCEDURE — 99999 PR PBB SHADOW E&M-EST. PATIENT-LVL III: ICD-10-PCS | Mod: PBBFAC,,, | Performed by: OPTOMETRIST

## 2021-07-30 PROCEDURE — 3288F PR FALLS RISK ASSESSMENT DOCUMENTED: ICD-10-PCS | Mod: CPTII,S$GLB,, | Performed by: OPTOMETRIST

## 2021-07-30 PROCEDURE — 99999 PR PBB SHADOW E&M-EST. PATIENT-LVL III: CPT | Mod: PBBFAC,,, | Performed by: OPTOMETRIST

## 2021-07-30 PROCEDURE — 92004 PR EYE EXAM, NEW PATIENT,COMPREHESV: ICD-10-PCS | Mod: S$GLB,,, | Performed by: OPTOMETRIST

## 2021-07-30 PROCEDURE — 99499 RISK ADDL DX/OHS AUDIT: ICD-10-PCS | Mod: S$GLB,,, | Performed by: OPTOMETRIST

## 2021-07-30 PROCEDURE — 1160F PR REVIEW ALL MEDS BY PRESCRIBER/CLIN PHARMACIST DOCUMENTED: ICD-10-PCS | Mod: CPTII,S$GLB,, | Performed by: OPTOMETRIST

## 2021-07-30 PROCEDURE — 2023F DILAT RTA XM W/O RTNOPTHY: CPT | Mod: CPTII,S$GLB,, | Performed by: OPTOMETRIST

## 2021-07-30 PROCEDURE — 1126F PR PAIN SEVERITY QUANTIFIED, NO PAIN PRESENT: ICD-10-PCS | Mod: CPTII,S$GLB,, | Performed by: OPTOMETRIST

## 2021-07-30 PROCEDURE — 3044F HG A1C LEVEL LT 7.0%: CPT | Mod: CPTII,S$GLB,, | Performed by: OPTOMETRIST

## 2021-07-30 PROCEDURE — 1159F PR MEDICATION LIST DOCUMENTED IN MEDICAL RECORD: ICD-10-PCS | Mod: CPTII,S$GLB,, | Performed by: OPTOMETRIST

## 2021-07-30 PROCEDURE — 2023F PR DILATED RETINAL EXAM W/O EVID OF RETINOPATHY: ICD-10-PCS | Mod: CPTII,S$GLB,, | Performed by: OPTOMETRIST

## 2021-07-30 PROCEDURE — 1101F PR PT FALLS ASSESS DOC 0-1 FALLS W/OUT INJ PAST YR: ICD-10-PCS | Mod: CPTII,S$GLB,, | Performed by: OPTOMETRIST

## 2021-07-30 PROCEDURE — 1101F PT FALLS ASSESS-DOCD LE1/YR: CPT | Mod: CPTII,S$GLB,, | Performed by: OPTOMETRIST

## 2021-07-30 PROCEDURE — 1126F AMNT PAIN NOTED NONE PRSNT: CPT | Mod: CPTII,S$GLB,, | Performed by: OPTOMETRIST

## 2021-07-30 PROCEDURE — 99499 UNLISTED E&M SERVICE: CPT | Mod: S$GLB,,, | Performed by: OPTOMETRIST

## 2021-07-30 PROCEDURE — 1160F RVW MEDS BY RX/DR IN RCRD: CPT | Mod: CPTII,S$GLB,, | Performed by: OPTOMETRIST

## 2021-07-30 PROCEDURE — 3288F FALL RISK ASSESSMENT DOCD: CPT | Mod: CPTII,S$GLB,, | Performed by: OPTOMETRIST

## 2021-07-30 PROCEDURE — 3044F PR MOST RECENT HEMOGLOBIN A1C LEVEL <7.0%: ICD-10-PCS | Mod: CPTII,S$GLB,, | Performed by: OPTOMETRIST

## 2021-07-30 PROCEDURE — 92004 COMPRE OPH EXAM NEW PT 1/>: CPT | Mod: S$GLB,,, | Performed by: OPTOMETRIST

## 2021-07-30 PROCEDURE — 1159F MED LIST DOCD IN RCRD: CPT | Mod: CPTII,S$GLB,, | Performed by: OPTOMETRIST

## 2021-08-07 ENCOUNTER — NURSE TRIAGE (OUTPATIENT)
Dept: ADMINISTRATIVE | Facility: CLINIC | Age: 71
End: 2021-08-07

## 2021-08-09 ENCOUNTER — LAB VISIT (OUTPATIENT)
Dept: FAMILY MEDICINE | Facility: CLINIC | Age: 71
End: 2021-08-09
Payer: MEDICARE

## 2021-08-09 ENCOUNTER — TELEPHONE (OUTPATIENT)
Dept: FAMILY MEDICINE | Facility: CLINIC | Age: 71
End: 2021-08-09

## 2021-08-09 DIAGNOSIS — Z20.822 SUSPECTED COVID-19 VIRUS INFECTION: ICD-10-CM

## 2021-08-09 DIAGNOSIS — Z20.822 SUSPECTED COVID-19 VIRUS INFECTION: Primary | ICD-10-CM

## 2021-08-09 PROCEDURE — U0005 INFEC AGEN DETEC AMPLI PROBE: HCPCS | Performed by: FAMILY MEDICINE

## 2021-08-09 PROCEDURE — U0003 INFECTIOUS AGENT DETECTION BY NUCLEIC ACID (DNA OR RNA); SEVERE ACUTE RESPIRATORY SYNDROME CORONAVIRUS 2 (SARS-COV-2) (CORONAVIRUS DISEASE [COVID-19]), AMPLIFIED PROBE TECHNIQUE, MAKING USE OF HIGH THROUGHPUT TECHNOLOGIES AS DESCRIBED BY CMS-2020-01-R: HCPCS | Performed by: FAMILY MEDICINE

## 2021-08-10 LAB
SARS-COV-2 RNA RESP QL NAA+PROBE: NOT DETECTED
SARS-COV-2- CYCLE NUMBER: -1

## 2021-08-19 ENCOUNTER — PATIENT MESSAGE (OUTPATIENT)
Dept: DERMATOLOGY | Facility: CLINIC | Age: 71
End: 2021-08-19

## 2021-08-26 ENCOUNTER — OFFICE VISIT (OUTPATIENT)
Dept: DERMATOLOGY | Facility: CLINIC | Age: 71
End: 2021-08-26
Payer: MEDICARE

## 2021-08-26 DIAGNOSIS — L40.8 SEBOPSORIASIS: Primary | ICD-10-CM

## 2021-08-26 DIAGNOSIS — L82.1 SEBORRHEIC KERATOSES: ICD-10-CM

## 2021-08-26 DIAGNOSIS — S90.222A SUBUNGUAL HEMATOMA OF TOENAIL OF LEFT FOOT, INITIAL ENCOUNTER: ICD-10-CM

## 2021-08-26 PROCEDURE — 99204 PR OFFICE/OUTPT VISIT, NEW, LEVL IV, 45-59 MIN: ICD-10-PCS | Mod: S$GLB,,, | Performed by: STUDENT IN AN ORGANIZED HEALTH CARE EDUCATION/TRAINING PROGRAM

## 2021-08-26 PROCEDURE — 1101F PT FALLS ASSESS-DOCD LE1/YR: CPT | Mod: CPTII,S$GLB,, | Performed by: STUDENT IN AN ORGANIZED HEALTH CARE EDUCATION/TRAINING PROGRAM

## 2021-08-26 PROCEDURE — 1101F PR PT FALLS ASSESS DOC 0-1 FALLS W/OUT INJ PAST YR: ICD-10-PCS | Mod: CPTII,S$GLB,, | Performed by: STUDENT IN AN ORGANIZED HEALTH CARE EDUCATION/TRAINING PROGRAM

## 2021-08-26 PROCEDURE — 99204 OFFICE O/P NEW MOD 45 MIN: CPT | Mod: S$GLB,,, | Performed by: STUDENT IN AN ORGANIZED HEALTH CARE EDUCATION/TRAINING PROGRAM

## 2021-08-26 PROCEDURE — 1159F PR MEDICATION LIST DOCUMENTED IN MEDICAL RECORD: ICD-10-PCS | Mod: CPTII,S$GLB,, | Performed by: STUDENT IN AN ORGANIZED HEALTH CARE EDUCATION/TRAINING PROGRAM

## 2021-08-26 PROCEDURE — 1159F MED LIST DOCD IN RCRD: CPT | Mod: CPTII,S$GLB,, | Performed by: STUDENT IN AN ORGANIZED HEALTH CARE EDUCATION/TRAINING PROGRAM

## 2021-08-26 PROCEDURE — 1126F PR PAIN SEVERITY QUANTIFIED, NO PAIN PRESENT: ICD-10-PCS | Mod: CPTII,S$GLB,, | Performed by: STUDENT IN AN ORGANIZED HEALTH CARE EDUCATION/TRAINING PROGRAM

## 2021-08-26 PROCEDURE — 99999 PR PBB SHADOW E&M-EST. PATIENT-LVL III: ICD-10-PCS | Mod: PBBFAC,,, | Performed by: STUDENT IN AN ORGANIZED HEALTH CARE EDUCATION/TRAINING PROGRAM

## 2021-08-26 PROCEDURE — 3288F FALL RISK ASSESSMENT DOCD: CPT | Mod: CPTII,S$GLB,, | Performed by: STUDENT IN AN ORGANIZED HEALTH CARE EDUCATION/TRAINING PROGRAM

## 2021-08-26 PROCEDURE — 3044F PR MOST RECENT HEMOGLOBIN A1C LEVEL <7.0%: ICD-10-PCS | Mod: CPTII,S$GLB,, | Performed by: STUDENT IN AN ORGANIZED HEALTH CARE EDUCATION/TRAINING PROGRAM

## 2021-08-26 PROCEDURE — 3288F PR FALLS RISK ASSESSMENT DOCUMENTED: ICD-10-PCS | Mod: CPTII,S$GLB,, | Performed by: STUDENT IN AN ORGANIZED HEALTH CARE EDUCATION/TRAINING PROGRAM

## 2021-08-26 PROCEDURE — 3044F HG A1C LEVEL LT 7.0%: CPT | Mod: CPTII,S$GLB,, | Performed by: STUDENT IN AN ORGANIZED HEALTH CARE EDUCATION/TRAINING PROGRAM

## 2021-08-26 PROCEDURE — 1126F AMNT PAIN NOTED NONE PRSNT: CPT | Mod: CPTII,S$GLB,, | Performed by: STUDENT IN AN ORGANIZED HEALTH CARE EDUCATION/TRAINING PROGRAM

## 2021-08-26 PROCEDURE — 1160F RVW MEDS BY RX/DR IN RCRD: CPT | Mod: CPTII,S$GLB,, | Performed by: STUDENT IN AN ORGANIZED HEALTH CARE EDUCATION/TRAINING PROGRAM

## 2021-08-26 PROCEDURE — 1160F PR REVIEW ALL MEDS BY PRESCRIBER/CLIN PHARMACIST DOCUMENTED: ICD-10-PCS | Mod: CPTII,S$GLB,, | Performed by: STUDENT IN AN ORGANIZED HEALTH CARE EDUCATION/TRAINING PROGRAM

## 2021-08-26 PROCEDURE — 99999 PR PBB SHADOW E&M-EST. PATIENT-LVL III: CPT | Mod: PBBFAC,,, | Performed by: STUDENT IN AN ORGANIZED HEALTH CARE EDUCATION/TRAINING PROGRAM

## 2021-08-26 RX ORDER — FLUOCINONIDE TOPICAL SOLUTION USP, 0.05% 0.5 MG/ML
SOLUTION TOPICAL 2 TIMES DAILY
Qty: 60 ML | Refills: 2 | Status: SHIPPED | OUTPATIENT
Start: 2021-08-26

## 2021-08-26 RX ORDER — KETOCONAZOLE 20 MG/ML
SHAMPOO, SUSPENSION TOPICAL
Qty: 120 ML | Refills: 4 | Status: SHIPPED | OUTPATIENT
Start: 2021-08-26

## 2021-08-28 DIAGNOSIS — R51.9 CHRONIC DAILY HEADACHE: ICD-10-CM

## 2021-08-29 RX ORDER — TIZANIDINE 2 MG/1
4 TABLET ORAL NIGHTLY PRN
Qty: 30 TABLET | Refills: 1 | Status: SHIPPED | OUTPATIENT
Start: 2021-08-29 | End: 2021-09-28 | Stop reason: SDUPTHER

## 2021-09-28 ENCOUNTER — PATIENT MESSAGE (OUTPATIENT)
Dept: FAMILY MEDICINE | Facility: CLINIC | Age: 71
End: 2021-09-28

## 2021-09-28 DIAGNOSIS — R51.9 CHRONIC DAILY HEADACHE: ICD-10-CM

## 2021-09-28 RX ORDER — TIZANIDINE 2 MG/1
4 TABLET ORAL NIGHTLY PRN
Qty: 30 TABLET | Refills: 1 | Status: SHIPPED | OUTPATIENT
Start: 2021-09-28 | End: 2021-10-07

## 2021-10-06 DIAGNOSIS — R51.9 CHRONIC DAILY HEADACHE: ICD-10-CM

## 2021-10-07 RX ORDER — TIZANIDINE 2 MG/1
4 TABLET ORAL NIGHTLY PRN
Qty: 30 TABLET | Refills: 1 | Status: SHIPPED | OUTPATIENT
Start: 2021-10-07 | End: 2021-10-24

## 2021-10-19 ENCOUNTER — PATIENT MESSAGE (OUTPATIENT)
Dept: FAMILY MEDICINE | Facility: CLINIC | Age: 71
End: 2021-10-19
Payer: MEDICARE

## 2021-10-23 DIAGNOSIS — R51.9 CHRONIC DAILY HEADACHE: ICD-10-CM

## 2021-10-24 RX ORDER — TIZANIDINE 2 MG/1
4 TABLET ORAL NIGHTLY PRN
Qty: 30 TABLET | Refills: 1 | Status: SHIPPED | OUTPATIENT
Start: 2021-10-24 | End: 2021-11-03

## 2021-11-02 DIAGNOSIS — R51.9 CHRONIC DAILY HEADACHE: ICD-10-CM

## 2021-11-03 RX ORDER — TIZANIDINE 2 MG/1
4 TABLET ORAL NIGHTLY PRN
Qty: 30 TABLET | Refills: 1 | Status: SHIPPED | OUTPATIENT
Start: 2021-11-03 | End: 2021-11-21

## 2021-11-20 DIAGNOSIS — R51.9 CHRONIC DAILY HEADACHE: ICD-10-CM

## 2021-11-21 RX ORDER — TIZANIDINE 2 MG/1
4 TABLET ORAL NIGHTLY PRN
Qty: 30 TABLET | Refills: 1 | Status: SHIPPED | OUTPATIENT
Start: 2021-11-21

## 2021-12-20 ENCOUNTER — IMMUNIZATION (OUTPATIENT)
Dept: PHARMACY | Facility: CLINIC | Age: 71
End: 2021-12-20
Payer: MEDICARE

## 2021-12-20 DIAGNOSIS — Z23 NEED FOR VACCINATION: Primary | ICD-10-CM

## 2021-12-23 ENCOUNTER — PATIENT MESSAGE (OUTPATIENT)
Dept: FAMILY MEDICINE | Facility: CLINIC | Age: 71
End: 2021-12-23
Payer: MEDICARE

## 2021-12-24 ENCOUNTER — NURSE TRIAGE (OUTPATIENT)
Dept: ADMINISTRATIVE | Facility: CLINIC | Age: 71
End: 2021-12-24
Payer: MEDICARE

## 2022-01-12 ENCOUNTER — OFFICE VISIT (OUTPATIENT)
Dept: PODIATRY | Facility: CLINIC | Age: 72
End: 2022-01-12
Payer: MEDICARE

## 2022-01-12 ENCOUNTER — LAB VISIT (OUTPATIENT)
Dept: LAB | Facility: HOSPITAL | Age: 72
End: 2022-01-12
Attending: FAMILY MEDICINE
Payer: MEDICARE

## 2022-01-12 DIAGNOSIS — E11.9 CONTROLLED TYPE 2 DIABETES MELLITUS WITHOUT COMPLICATION, WITHOUT LONG-TERM CURRENT USE OF INSULIN: ICD-10-CM

## 2022-01-12 DIAGNOSIS — B35.1 ONYCHOMYCOSIS DUE TO DERMATOPHYTE: ICD-10-CM

## 2022-01-12 DIAGNOSIS — E11.49 TYPE II DIABETES MELLITUS WITH NEUROLOGICAL MANIFESTATIONS: Primary | ICD-10-CM

## 2022-01-12 LAB
ALBUMIN/CREAT UR: 37.3 UG/MG (ref 0–30)
CREAT UR-MCNC: 51 MG/DL (ref 15–325)
MICROALBUMIN UR DL<=1MG/L-MCNC: 19 UG/ML

## 2022-01-12 PROCEDURE — 1160F RVW MEDS BY RX/DR IN RCRD: CPT | Mod: CPTII,S$GLB,, | Performed by: PODIATRIST

## 2022-01-12 PROCEDURE — 99999 PR PBB SHADOW E&M-EST. PATIENT-LVL III: ICD-10-PCS | Mod: PBBFAC,,, | Performed by: PODIATRIST

## 2022-01-12 PROCEDURE — 1126F PR PAIN SEVERITY QUANTIFIED, NO PAIN PRESENT: ICD-10-PCS | Mod: CPTII,S$GLB,, | Performed by: PODIATRIST

## 2022-01-12 PROCEDURE — 3072F LOW RISK FOR RETINOPATHY: CPT | Mod: CPTII,S$GLB,, | Performed by: PODIATRIST

## 2022-01-12 PROCEDURE — 99999 PR PBB SHADOW E&M-EST. PATIENT-LVL III: CPT | Mod: PBBFAC,,, | Performed by: PODIATRIST

## 2022-01-12 PROCEDURE — 99213 PR OFFICE/OUTPT VISIT, EST, LEVL III, 20-29 MIN: ICD-10-PCS | Mod: S$GLB,,, | Performed by: PODIATRIST

## 2022-01-12 PROCEDURE — 1159F MED LIST DOCD IN RCRD: CPT | Mod: CPTII,S$GLB,, | Performed by: PODIATRIST

## 2022-01-12 PROCEDURE — 3288F PR FALLS RISK ASSESSMENT DOCUMENTED: ICD-10-PCS | Mod: CPTII,S$GLB,, | Performed by: PODIATRIST

## 2022-01-12 PROCEDURE — 1101F PR PT FALLS ASSESS DOC 0-1 FALLS W/OUT INJ PAST YR: ICD-10-PCS | Mod: CPTII,S$GLB,, | Performed by: PODIATRIST

## 2022-01-12 PROCEDURE — 3072F PR LOW RISK FOR RETINOPATHY: ICD-10-PCS | Mod: CPTII,S$GLB,, | Performed by: PODIATRIST

## 2022-01-12 PROCEDURE — 99499 UNLISTED E&M SERVICE: CPT | Mod: S$GLB,,, | Performed by: PODIATRIST

## 2022-01-12 PROCEDURE — 1160F PR REVIEW ALL MEDS BY PRESCRIBER/CLIN PHARMACIST DOCUMENTED: ICD-10-PCS | Mod: CPTII,S$GLB,, | Performed by: PODIATRIST

## 2022-01-12 PROCEDURE — 3288F FALL RISK ASSESSMENT DOCD: CPT | Mod: CPTII,S$GLB,, | Performed by: PODIATRIST

## 2022-01-12 PROCEDURE — 1159F PR MEDICATION LIST DOCUMENTED IN MEDICAL RECORD: ICD-10-PCS | Mod: CPTII,S$GLB,, | Performed by: PODIATRIST

## 2022-01-12 PROCEDURE — 82570 ASSAY OF URINE CREATININE: CPT | Performed by: FAMILY MEDICINE

## 2022-01-12 PROCEDURE — 99213 OFFICE O/P EST LOW 20 MIN: CPT | Mod: S$GLB,,, | Performed by: PODIATRIST

## 2022-01-12 PROCEDURE — 99499 RISK ADDL DX/OHS AUDIT: ICD-10-PCS | Mod: S$GLB,,, | Performed by: PODIATRIST

## 2022-01-12 PROCEDURE — 1126F AMNT PAIN NOTED NONE PRSNT: CPT | Mod: CPTII,S$GLB,, | Performed by: PODIATRIST

## 2022-01-12 PROCEDURE — 1101F PT FALLS ASSESS-DOCD LE1/YR: CPT | Mod: CPTII,S$GLB,, | Performed by: PODIATRIST

## 2022-01-12 NOTE — PROGRESS NOTES
Subjective:      Patient ID: Eulalia Villarreal is a 71 y.o. female.    Chief Complaint: Foot Problem (Bilateral, Sunny Gee 07/19/21, A1C 6.4 07/12/21 FOOT EXAM DUE ) and Nail Problem (L grt toe, R grt and 2nd toe )    Eulalia is a 71 y.o. female who presents to the clinic upon referral from Dr. Lucille espinosa. provider found  for evaluation and treatment of diabetic feet. Eulalia has a past medical history of Arthritis, Diabetes mellitus, Herniated disc, and Hyperlipidemia. She relates to numbness and tingling in her toes bilateral, this is getting worse over the last several months and she relates it is worse when at rest. Also relates to some discoloration in her nails 1-2 bilateral.    PCP: Sunny Gee MD    Date Last Seen by PCP: 7/19/21    Current shoe gear: Casual shoes    Hemoglobin A1C   Date Value Ref Range Status   07/12/2021 6.4 (H) 4.0 - 5.6 % Final     Comment:     ADA Screening Guidelines:  5.7-6.4%  Consistent with prediabetes  >or=6.5%  Consistent with diabetes    High levels of fetal hemoglobin interfere with the HbA1C  assay. Heterozygous hemoglobin variants (HbS, HgC, etc)do  not significantly interfere with this assay.   However, presence of multiple variants may affect accuracy.     01/04/2021 6.2 (H) 4.0 - 5.6 % Final     Comment:     ADA Screening Guidelines:  5.7-6.4%  Consistent with prediabetes  >or=6.5%  Consistent with diabetes  High levels of fetal hemoglobin interfere with the HbA1C  assay. Heterozygous hemoglobin variants (HbS, HgC, etc)do  not significantly interfere with this assay.   However, presence of multiple variants may affect accuracy.     07/06/2020 6.1 (H) 4.0 - 5.6 % Final     Comment:     ADA Screening Guidelines:  5.7-6.4%  Consistent with prediabetes  >or=6.5%  Consistent with diabetes  High levels of fetal hemoglobin interfere with the HbA1C  assay. Heterozygous hemoglobin variants (HbS, HgC, etc)do  not significantly interfere with this assay.   However,  presence of multiple variants may affect accuracy.             Review of Systems   Constitutional: Negative for chills and fever.   Cardiovascular: Negative for claudication and leg swelling.   Respiratory: Negative for shortness of breath.    Skin: Positive for nail changes. Negative for itching and rash.   Musculoskeletal: Negative for muscle cramps, muscle weakness and myalgias.   Gastrointestinal: Negative for nausea and vomiting.   Neurological: Negative for focal weakness, loss of balance, numbness and paresthesias.           Objective:      Physical Exam  Constitutional:       General: She is not in acute distress.     Appearance: She is well-developed. She is not diaphoretic.   Cardiovascular:      Pulses:           Dorsalis pedis pulses are 2+ on the right side and 2+ on the left side.        Posterior tibial pulses are 2+ on the right side and 2+ on the left side.      Comments: < 3 sec capillary refill time to toes 1-5 bilateral. Toes and feet are warm to touch proximally with normal distal cooling b/l. There is some hair growth on the feet and toes b/l. There is no edema b/l. No spider veins or varicosities present b/l.     Musculoskeletal:      Comments: Equinus noted b/l ankles with < 10 deg DF noted. MMT 5/5 in DF/PF/Inv/Ev resistance with no reproduction of pain in any direction. Passive range of motion of ankle and pedal joints is painless b/l.     Feet:      Right foot:      Protective Sensation: 10 sites tested. 10 sites sensed.      Left foot:      Protective Sensation: 10 sites tested. 10 sites sensed.   Skin:     General: Skin is warm and dry.      Coloration: Skin is not pale.      Findings: No abrasion, bruising, burn, ecchymosis, erythema, laceration, lesion, petechiae or rash.      Nails: There is no clubbing.      Comments: Skin temperature, texture and turgor within normal limits.    Nails 1-2 bilateral are slightly darkened with discoloration and slightly thicker, no subungual debris.    Neurological:      Mental Status: She is alert and oriented to person, place, and time.      Sensory: No sensory deficit.      Motor: No tremor, atrophy or abnormal muscle tone.      Comments: Negative tinel sign bilateral.   Psychiatric:         Behavior: Behavior normal.               Assessment:       Encounter Diagnoses   Name Primary?    Type II diabetes mellitus with neurological manifestations Yes    Onychomycosis due to dermatophyte          Plan:       Eulalia was seen today for foot problem and nail problem.    Diagnoses and all orders for this visit:    Type II diabetes mellitus with neurological manifestations    Onychomycosis due to dermatophyte      I counseled the patient on her conditions, their implications and medical management.    Shoe inspection. Diabetic Foot Education. Patient reminded of the importance of good nutrition and blood sugar control to help prevent podiatric complications of diabetes. Patient instructed on proper foot hygeine. We discussed wearing proper shoe gear, daily foot inspections, never walking without protective shoe gear, never putting sharp instruments to feet    Nail changes are either from trauma or from fungus    Discussed all treatment options such as topical, oral, laser treatments vs surgical removal of nail permanent vs non-permanent in detail pertaining to nail fungus    Will watch the nails for now    Recommend for neuropathy symptoms b complex vitamin daily with 600 mg alpha lipoic acid daily    Return PRN or 1 year diabetic foot check.    Perez Rangel DPM                      details

## 2022-01-19 ENCOUNTER — OFFICE VISIT (OUTPATIENT)
Dept: FAMILY MEDICINE | Facility: CLINIC | Age: 72
End: 2022-01-19
Payer: MEDICARE

## 2022-01-19 VITALS
OXYGEN SATURATION: 96 % | RESPIRATION RATE: 18 BRPM | SYSTOLIC BLOOD PRESSURE: 134 MMHG | HEART RATE: 66 BPM | TEMPERATURE: 98 F | DIASTOLIC BLOOD PRESSURE: 68 MMHG | HEIGHT: 64 IN | BODY MASS INDEX: 23.37 KG/M2 | WEIGHT: 136.88 LBS

## 2022-01-19 DIAGNOSIS — E11.9 CONTROLLED TYPE 2 DIABETES MELLITUS WITHOUT COMPLICATION, WITHOUT LONG-TERM CURRENT USE OF INSULIN: Primary | ICD-10-CM

## 2022-01-19 DIAGNOSIS — E78.5 HYPERLIPIDEMIA LDL GOAL <100: ICD-10-CM

## 2022-01-19 DIAGNOSIS — R06.83 SNORING: ICD-10-CM

## 2022-01-19 DIAGNOSIS — Z78.0 MENOPAUSE: ICD-10-CM

## 2022-01-19 DIAGNOSIS — R51.9 CHRONIC DAILY HEADACHE: ICD-10-CM

## 2022-01-19 PROCEDURE — 3288F FALL RISK ASSESSMENT DOCD: CPT | Mod: CPTII,S$GLB,, | Performed by: FAMILY MEDICINE

## 2022-01-19 PROCEDURE — 3075F SYST BP GE 130 - 139MM HG: CPT | Mod: CPTII,S$GLB,, | Performed by: FAMILY MEDICINE

## 2022-01-19 PROCEDURE — 3075F PR MOST RECENT SYSTOLIC BLOOD PRESS GE 130-139MM HG: ICD-10-PCS | Mod: CPTII,S$GLB,, | Performed by: FAMILY MEDICINE

## 2022-01-19 PROCEDURE — 3008F BODY MASS INDEX DOCD: CPT | Mod: CPTII,S$GLB,, | Performed by: FAMILY MEDICINE

## 2022-01-19 PROCEDURE — 3288F PR FALLS RISK ASSESSMENT DOCUMENTED: ICD-10-PCS | Mod: CPTII,S$GLB,, | Performed by: FAMILY MEDICINE

## 2022-01-19 PROCEDURE — 99999 PR PBB SHADOW E&M-EST. PATIENT-LVL III: ICD-10-PCS | Mod: PBBFAC,,, | Performed by: FAMILY MEDICINE

## 2022-01-19 PROCEDURE — 3078F DIAST BP <80 MM HG: CPT | Mod: CPTII,S$GLB,, | Performed by: FAMILY MEDICINE

## 2022-01-19 PROCEDURE — 3072F LOW RISK FOR RETINOPATHY: CPT | Mod: CPTII,S$GLB,, | Performed by: FAMILY MEDICINE

## 2022-01-19 PROCEDURE — 3066F NEPHROPATHY DOC TX: CPT | Mod: CPTII,S$GLB,, | Performed by: FAMILY MEDICINE

## 2022-01-19 PROCEDURE — 3044F HG A1C LEVEL LT 7.0%: CPT | Mod: CPTII,S$GLB,, | Performed by: FAMILY MEDICINE

## 2022-01-19 PROCEDURE — 1101F PR PT FALLS ASSESS DOC 0-1 FALLS W/OUT INJ PAST YR: ICD-10-PCS | Mod: CPTII,S$GLB,, | Performed by: FAMILY MEDICINE

## 2022-01-19 PROCEDURE — 99214 OFFICE O/P EST MOD 30 MIN: CPT | Mod: S$GLB,,, | Performed by: FAMILY MEDICINE

## 2022-01-19 PROCEDURE — 99999 PR PBB SHADOW E&M-EST. PATIENT-LVL III: CPT | Mod: PBBFAC,,, | Performed by: FAMILY MEDICINE

## 2022-01-19 PROCEDURE — 3044F PR MOST RECENT HEMOGLOBIN A1C LEVEL <7.0%: ICD-10-PCS | Mod: CPTII,S$GLB,, | Performed by: FAMILY MEDICINE

## 2022-01-19 PROCEDURE — 3066F PR DOCUMENTATION OF TREATMENT FOR NEPHROPATHY: ICD-10-PCS | Mod: CPTII,S$GLB,, | Performed by: FAMILY MEDICINE

## 2022-01-19 PROCEDURE — 1101F PT FALLS ASSESS-DOCD LE1/YR: CPT | Mod: CPTII,S$GLB,, | Performed by: FAMILY MEDICINE

## 2022-01-19 PROCEDURE — 1126F PR PAIN SEVERITY QUANTIFIED, NO PAIN PRESENT: ICD-10-PCS | Mod: CPTII,S$GLB,, | Performed by: FAMILY MEDICINE

## 2022-01-19 PROCEDURE — 1159F PR MEDICATION LIST DOCUMENTED IN MEDICAL RECORD: ICD-10-PCS | Mod: CPTII,S$GLB,, | Performed by: FAMILY MEDICINE

## 2022-01-19 PROCEDURE — 1159F MED LIST DOCD IN RCRD: CPT | Mod: CPTII,S$GLB,, | Performed by: FAMILY MEDICINE

## 2022-01-19 PROCEDURE — 99499 UNLISTED E&M SERVICE: CPT | Mod: S$GLB,,, | Performed by: FAMILY MEDICINE

## 2022-01-19 PROCEDURE — 99499 RISK ADDL DX/OHS AUDIT: ICD-10-PCS | Mod: S$GLB,,, | Performed by: FAMILY MEDICINE

## 2022-01-19 PROCEDURE — 3060F PR POS MICROALBUMINURIA RESULT DOCUMENTED/REVIEW: ICD-10-PCS | Mod: CPTII,S$GLB,, | Performed by: FAMILY MEDICINE

## 2022-01-19 PROCEDURE — 3060F POS MICROALBUMINURIA REV: CPT | Mod: CPTII,S$GLB,, | Performed by: FAMILY MEDICINE

## 2022-01-19 PROCEDURE — 3078F PR MOST RECENT DIASTOLIC BLOOD PRESSURE < 80 MM HG: ICD-10-PCS | Mod: CPTII,S$GLB,, | Performed by: FAMILY MEDICINE

## 2022-01-19 PROCEDURE — 99214 PR OFFICE/OUTPT VISIT, EST, LEVL IV, 30-39 MIN: ICD-10-PCS | Mod: S$GLB,,, | Performed by: FAMILY MEDICINE

## 2022-01-19 PROCEDURE — 3072F PR LOW RISK FOR RETINOPATHY: ICD-10-PCS | Mod: CPTII,S$GLB,, | Performed by: FAMILY MEDICINE

## 2022-01-19 PROCEDURE — 3008F PR BODY MASS INDEX (BMI) DOCUMENTED: ICD-10-PCS | Mod: CPTII,S$GLB,, | Performed by: FAMILY MEDICINE

## 2022-01-19 PROCEDURE — 1126F AMNT PAIN NOTED NONE PRSNT: CPT | Mod: CPTII,S$GLB,, | Performed by: FAMILY MEDICINE

## 2022-01-19 RX ORDER — METFORMIN HYDROCHLORIDE 500 MG/1
500 TABLET ORAL 2 TIMES DAILY WITH MEALS
Qty: 180 TABLET | Refills: 3 | Status: SHIPPED | OUTPATIENT
Start: 2022-01-19 | End: 2023-01-19 | Stop reason: SDUPTHER

## 2022-01-19 RX ORDER — ROSUVASTATIN CALCIUM 10 MG/1
10 TABLET, COATED ORAL DAILY
Qty: 90 TABLET | Refills: 3 | Status: SHIPPED | OUTPATIENT
Start: 2022-01-19 | End: 2023-01-19 | Stop reason: SDUPTHER

## 2022-01-19 RX ORDER — INFLUENZA VACCINE, ADJUVANTED 15; 15; 15; 15 UG/.5ML; UG/.5ML; UG/.5ML; UG/.5ML
INJECTION, SUSPENSION INTRAMUSCULAR
COMMUNITY
Start: 2021-10-23

## 2022-01-19 NOTE — PROGRESS NOTES
Subjective:       Patient ID: Eulalia Villarreal is a 71 y.o. female.    Chief Complaint: Diabetes (6 month follow up with labs prior)    Here for 6 month f/u on diabetes    DM2 - taking metformin 500mg BID  HLD - taking Crestor 10mg daily with CoQ 10  Daily HA - taking tylenol PRN; using voltaren topical at bedtime    Since Covid booster has felt some dry cough.  She did have a large local reaction to the vaccine.    Recently saw podiatry and was diagnosed with neuropathy. B12 and ALA advised.    Some regular snoring. No witnessed apnea.  Wearing mouthpiece for jaw clinching          Diabetes  Hypoglycemia symptoms include headaches (posterior neck with radiation into the scalp.). Pertinent negatives for hypoglycemia include no dizziness. Pertinent negatives for diabetes include no chest pain and no weakness.   Follow-up  Associated symptoms include arthralgias (hands) and headaches (posterior neck with radiation into the scalp.). Pertinent negatives include no abdominal pain, chest pain, chills, coughing, fever, nausea, neck pain, numbness, rash, sore throat, vomiting or weakness.   Headache   Pertinent negatives include no abdominal pain, coughing, dizziness, eye pain, fever, nausea, neck pain, numbness, sore throat, vomiting or weakness.   Hip Pain   Pertinent negatives include no numbness.       Past Medical History:   Diagnosis Date    Arthritis     hands    Diabetes mellitus     Herniated disc     L5    Hyperlipidemia        Past Surgical History:   Procedure Laterality Date    ADENOIDECTOMY      BACK SURGERY  1982    discectomy    FLEXIBLE SIGMOIDOSCOPY  8/7/1990  [facility]    TONSILLECTOMY      WISDOM TOOTH EXTRACTION         Review of patient's allergies indicates:   Allergen Reactions    Statins-hmg-coa reductase inhibitors Other (See Comments)     Severe pain to her left side    Meperidine      Other reaction(s): hyper    Sulfa (sulfonamide antibiotics) Rash     Other reaction(s): Itching        Social History     Socioeconomic History    Marital status:    Occupational History    Occupation: retired    Tobacco Use    Smoking status: Never Smoker    Smokeless tobacco: Never Used   Substance and Sexual Activity    Alcohol use: No     Comment: rare    Drug use: No    Sexual activity: Never       Current Outpatient Medications on File Prior to Visit   Medication Sig Dispense Refill    ascorbic acid, vitamin C, (VITAMIN C) 500 MG tablet Take 1 tablet by mouth Daily.      diclofenac sodium (VOLTAREN) 1 % Gel Apply 2 g topically 4 (four) times daily. To hands as needed 100 g 1    FLUAD QUAD 2021-22,65Y UP,,PF, 60 mcg (15 mcg x 4)/0.5 mL Syrg       fluocinonide (LIDEX) 0.05 % external solution Apply topically 2 (two) times daily. 60 mL 2    glucosamine-D3-Boswellia serr 1,500-400-100 mg-unit-mg Tab Take by mouth.      ketoconazole (NIZORAL) 2 % shampoo Apply topically twice a week. 120 mL 4    tiZANidine (ZANAFLEX) 2 MG tablet TAKE 2 TABLETS (4 MG TOTAL) BY MOUTH NIGHTLY AS NEEDED (HEADACHE). 30 tablet 1    UBIDECARENONE (CO Q-10 ORAL) Take by mouth.      vitamin D 185 MG Tab Take 2,000 mg by mouth once daily.      VITAMIN E ACETATE (VITAMIN E ORAL) Take 1 capsule by mouth Daily.      [DISCONTINUED] metFORMIN (GLUCOPHAGE) 500 MG tablet Take 1 tablet (500 mg total) by mouth 2 (two) times daily with meals. 180 tablet 3    [DISCONTINUED] rosuvastatin (CRESTOR) 10 MG tablet Take 1 tablet (10 mg total) by mouth once daily. 90 tablet 3     No current facility-administered medications on file prior to visit.       Family History   Problem Relation Age of Onset    Diabetes Mother     Coronary artery disease Mother     Diabetes Son     Cancer Maternal Aunt     Cerebral aneurysm Father     Coronary artery disease Sister     Coronary artery disease Brother     Heart disease Neg Hx     Glaucoma Neg Hx     Retinal detachment Neg Hx     Macular degeneration Neg Hx      "Melanoma Neg Hx        Review of Systems   Constitutional: Negative for appetite change, chills, fever and unexpected weight change.   HENT: Negative for sore throat and trouble swallowing.    Eyes: Negative for pain and visual disturbance.   Respiratory: Negative for cough, shortness of breath and wheezing.    Cardiovascular: Negative for chest pain and palpitations.   Gastrointestinal: Negative for abdominal pain, blood in stool, diarrhea, nausea and vomiting.   Genitourinary: Negative for difficulty urinating, dysuria and hematuria.   Musculoskeletal: Positive for arthralgias (hands). Negative for gait problem and neck pain.   Skin: Negative for rash and wound.   Neurological: Positive for headaches (posterior neck with radiation into the scalp.). Negative for dizziness, weakness and numbness.   Hematological: Negative for adenopathy.   Psychiatric/Behavioral: Negative for dysphoric mood.       Objective:      /68   Pulse 66   Temp 97.5 °F (36.4 °C) (Oral)   Resp 18   Ht 5' 4" (1.626 m)   Wt 62.1 kg (136 lb 14.5 oz)   SpO2 96%   BMI 23.50 kg/m²   Physical Exam  Constitutional:       Appearance: Normal appearance. She is well-developed.   HENT:      Head: Normocephalic.      Mouth/Throat:      Pharynx: No oropharyngeal exudate or posterior oropharyngeal erythema.   Eyes:      Conjunctiva/sclera: Conjunctivae normal.      Pupils: Pupils are equal, round, and reactive to light.   Neck:      Thyroid: No thyromegaly.   Cardiovascular:      Rate and Rhythm: Normal rate and regular rhythm.      Heart sounds: Normal heart sounds, S1 normal and S2 normal. No murmur heard.  No friction rub. No gallop.    Pulmonary:      Effort: Pulmonary effort is normal.      Breath sounds: Normal breath sounds. No wheezing or rales.   Abdominal:      General: Bowel sounds are normal.      Palpations: Abdomen is soft.   Musculoskeletal:      Cervical back: Normal range of motion and neck supple.      Lumbar back: Normal.      " Left hip: Normal.      Right lower leg: No edema.      Left lower leg: No edema.   Lymphadenopathy:      Cervical: No cervical adenopathy.   Skin:     General: Skin is warm.      Findings: No rash.   Neurological:      Mental Status: She is alert and oriented to person, place, and time.      Cranial Nerves: No cranial nerve deficit.      Gait: Gait normal.       Foot exam: inspection normal, sensation intact; 2+ DP pulses bilaterally    Results for orders placed or performed in visit on 01/12/22   Microalbumin/Creatinine Ratio, Urine   Result Value Ref Range    Microalbumin, Urine 19.0 ug/mL    Creatinine, Urine 51.0 15.0 - 325.0 mg/dL    Microalb/Creat Ratio 37.3 (H) 0.0 - 30.0 ug/mg     Labs 1/12/22 reviewed      Assessment:       1. Controlled type 2 diabetes mellitus without complication, without long-term current use of insulin    2. Hyperlipidemia LDL goal <100    3. Chronic daily headache    4. Menopause    5. Snoring        Plan:       Controlled type 2 diabetes mellitus without complication, without long-term current use of insulin  -     Hemoglobin A1C; Future; Expected date: 07/18/2022  -     Comprehensive Metabolic Panel; Future; Expected date: 07/18/2022  -     metFORMIN (GLUCOPHAGE) 500 MG tablet; Take 1 tablet (500 mg total) by mouth 2 (two) times daily with meals.  Dispense: 180 tablet; Refill: 3    Hyperlipidemia LDL goal <100  -     rosuvastatin (CRESTOR) 10 MG tablet; Take 1 tablet (10 mg total) by mouth once daily.  Dispense: 90 tablet; Refill: 3    Chronic daily headache    Menopause  -     DXA Bone Density Spine And Hip; Future; Expected date: 01/19/2022    Snoring  -     Ambulatory referral/consult to ENT; Future; Expected date: 01/26/2022          Doing well  continue present meds  F/u 6 months with labs  Counseled on regular exercise, maintenance of a healthy weight, balanced diet rich in fruits/vegetables and lean protein, and avoidance of unhealthy habits like smoking and excessive alcohol  intake.

## 2022-02-11 ENCOUNTER — PES CALL (OUTPATIENT)
Dept: ADMINISTRATIVE | Facility: CLINIC | Age: 72
End: 2022-02-11
Payer: MEDICARE

## 2022-03-02 ENCOUNTER — OFFICE VISIT (OUTPATIENT)
Dept: OTOLARYNGOLOGY | Facility: CLINIC | Age: 72
End: 2022-03-02
Payer: MEDICARE

## 2022-03-02 VITALS — BODY MASS INDEX: 23.31 KG/M2 | WEIGHT: 135.81 LBS

## 2022-03-02 DIAGNOSIS — R06.83 SNORING: ICD-10-CM

## 2022-03-02 DIAGNOSIS — G47.30 SLEEP DISORDER BREATHING: Primary | ICD-10-CM

## 2022-03-02 PROCEDURE — 1159F MED LIST DOCD IN RCRD: CPT | Mod: CPTII,S$GLB,, | Performed by: OTOLARYNGOLOGY

## 2022-03-02 PROCEDURE — 99213 PR OFFICE/OUTPT VISIT, EST, LEVL III, 20-29 MIN: ICD-10-PCS | Mod: S$GLB,,, | Performed by: OTOLARYNGOLOGY

## 2022-03-02 PROCEDURE — 3288F PR FALLS RISK ASSESSMENT DOCUMENTED: ICD-10-PCS | Mod: CPTII,S$GLB,, | Performed by: OTOLARYNGOLOGY

## 2022-03-02 PROCEDURE — 3072F PR LOW RISK FOR RETINOPATHY: ICD-10-PCS | Mod: CPTII,S$GLB,, | Performed by: OTOLARYNGOLOGY

## 2022-03-02 PROCEDURE — 1126F AMNT PAIN NOTED NONE PRSNT: CPT | Mod: CPTII,S$GLB,, | Performed by: OTOLARYNGOLOGY

## 2022-03-02 PROCEDURE — 3008F BODY MASS INDEX DOCD: CPT | Mod: CPTII,S$GLB,, | Performed by: OTOLARYNGOLOGY

## 2022-03-02 PROCEDURE — 99999 PR PBB SHADOW E&M-EST. PATIENT-LVL III: ICD-10-PCS | Mod: PBBFAC,,, | Performed by: OTOLARYNGOLOGY

## 2022-03-02 PROCEDURE — 3066F NEPHROPATHY DOC TX: CPT | Mod: CPTII,S$GLB,, | Performed by: OTOLARYNGOLOGY

## 2022-03-02 PROCEDURE — 99999 PR PBB SHADOW E&M-EST. PATIENT-LVL III: CPT | Mod: PBBFAC,,, | Performed by: OTOLARYNGOLOGY

## 2022-03-02 PROCEDURE — 1101F PT FALLS ASSESS-DOCD LE1/YR: CPT | Mod: CPTII,S$GLB,, | Performed by: OTOLARYNGOLOGY

## 2022-03-02 PROCEDURE — 3066F PR DOCUMENTATION OF TREATMENT FOR NEPHROPATHY: ICD-10-PCS | Mod: CPTII,S$GLB,, | Performed by: OTOLARYNGOLOGY

## 2022-03-02 PROCEDURE — 3072F LOW RISK FOR RETINOPATHY: CPT | Mod: CPTII,S$GLB,, | Performed by: OTOLARYNGOLOGY

## 2022-03-02 PROCEDURE — 3008F PR BODY MASS INDEX (BMI) DOCUMENTED: ICD-10-PCS | Mod: CPTII,S$GLB,, | Performed by: OTOLARYNGOLOGY

## 2022-03-02 PROCEDURE — 1159F PR MEDICATION LIST DOCUMENTED IN MEDICAL RECORD: ICD-10-PCS | Mod: CPTII,S$GLB,, | Performed by: OTOLARYNGOLOGY

## 2022-03-02 PROCEDURE — 1101F PR PT FALLS ASSESS DOC 0-1 FALLS W/OUT INJ PAST YR: ICD-10-PCS | Mod: CPTII,S$GLB,, | Performed by: OTOLARYNGOLOGY

## 2022-03-02 PROCEDURE — 3288F FALL RISK ASSESSMENT DOCD: CPT | Mod: CPTII,S$GLB,, | Performed by: OTOLARYNGOLOGY

## 2022-03-02 PROCEDURE — 1160F PR REVIEW ALL MEDS BY PRESCRIBER/CLIN PHARMACIST DOCUMENTED: ICD-10-PCS | Mod: CPTII,S$GLB,, | Performed by: OTOLARYNGOLOGY

## 2022-03-02 PROCEDURE — 3044F PR MOST RECENT HEMOGLOBIN A1C LEVEL <7.0%: ICD-10-PCS | Mod: CPTII,S$GLB,, | Performed by: OTOLARYNGOLOGY

## 2022-03-02 PROCEDURE — 1160F RVW MEDS BY RX/DR IN RCRD: CPT | Mod: CPTII,S$GLB,, | Performed by: OTOLARYNGOLOGY

## 2022-03-02 PROCEDURE — 3044F HG A1C LEVEL LT 7.0%: CPT | Mod: CPTII,S$GLB,, | Performed by: OTOLARYNGOLOGY

## 2022-03-02 PROCEDURE — 99213 OFFICE O/P EST LOW 20 MIN: CPT | Mod: S$GLB,,, | Performed by: OTOLARYNGOLOGY

## 2022-03-02 PROCEDURE — 1126F PR PAIN SEVERITY QUANTIFIED, NO PAIN PRESENT: ICD-10-PCS | Mod: CPTII,S$GLB,, | Performed by: OTOLARYNGOLOGY

## 2022-03-02 PROCEDURE — 3060F POS MICROALBUMINURIA REV: CPT | Mod: CPTII,S$GLB,, | Performed by: OTOLARYNGOLOGY

## 2022-03-02 PROCEDURE — 3060F PR POS MICROALBUMINURIA RESULT DOCUMENTED/REVIEW: ICD-10-PCS | Mod: CPTII,S$GLB,, | Performed by: OTOLARYNGOLOGY

## 2022-03-02 RX ORDER — CEPHRADINE 500 MG
200 CAPSULE ORAL DAILY
COMMUNITY
End: 2024-02-28

## 2022-03-02 NOTE — PROGRESS NOTES
Subjective:       Patient ID: Eulalia Villarreal is a 71 y.o. female.    Chief Complaint: Snoring (Hoarseness in Am, post-nasal drip in PM, dry throat)    Eulalia is here for snoring.   Length of symptoms: years, but has been worsening over this time. Noted primarily by daughter. She does wear a nightguard for grinding for 5 years. No witnessed apneas, but sleep quality is poor. She gets 4-5 hours of sleep per night and wakes up often. Denies sleep aids. No alcohol prior to bed.   No weight changes. No previous sleep study.   She does have intermittent morning mouth dryness.   She has no current issues with sinusitis since she moved here on the [facility].      Patient validated questionnaires (if applicable):      %       No flowsheet data found.  No flowsheet data found.  No flowsheet data found.         Social History     Tobacco Use   Smoking Status Never Smoker   Smokeless Tobacco Never Used     Social History     Substance and Sexual Activity   Alcohol Use No    Comment: rare          Objective:        Constitutional:   She is oriented to person, place, and time. She appears well-developed and well-nourished. She appears alert. She is active. Normal speech.      Head:  Normocephalic and atraumatic. Head is without TMJ tenderness. No scars. Salivary glands normal.  Facial strength is normal.      Ears:    Right Ear: No drainage or swelling. No middle ear effusion.   Left Ear: No drainage or swelling.  No middle ear effusion.     Nose:  Septal deviation (moderate R) present. No mucosal edema, rhinorrhea or sinus tenderness. Turbinate hypertrophy (boggy, moderate).      Mouth/Throat  Oropharynx clear and moist without lesions or asymmetry, normal uvula midline and mirror exam normal. Normal dentition. No uvula swelling, lacerations or trismus. No oropharyngeal exudate. Tonsillar erythema, tonsillar exudate.    Cords are mobile, retroflexed epiglottis over majority of larynx at rest.       Neck:  Full range of  motion with neck supple and no adenopathy. Thyroid tenderness is present. No tracheal deviation, no edema, no erythema, normal range of motion, no stridor, no crepitus and no neck rigidity present. No thyroid mass present.     Cardiovascular:   Intact distal pulses and normal pulses.      Pulmonary/Chest:   Effort normal and breath sounds normal. No stridor.     Psychiatric:   Her speech is normal and behavior is normal. Her mood appears not anxious. Her affect is not labile.     Neurological:   She is alert and oriented to person, place, and time. No sensory deficit.     Skin:   No abrasions, lacerations, lesions, or rashes. No abrasion and no bruising noted.         Tests / Results:  none    Assessment:       1. Sleep disorder breathing    2. Snoring          Plan:         Rec PSG  Discussed snoring vs. ISABEL  May consider OA pending PSG

## 2022-03-21 ENCOUNTER — PATIENT MESSAGE (OUTPATIENT)
Dept: OTOLARYNGOLOGY | Facility: CLINIC | Age: 72
End: 2022-03-21
Payer: MEDICARE

## 2022-03-22 ENCOUNTER — PATIENT MESSAGE (OUTPATIENT)
Dept: OTOLARYNGOLOGY | Facility: CLINIC | Age: 72
End: 2022-03-22
Payer: MEDICARE

## 2022-03-22 DIAGNOSIS — G47.33 OSA (OBSTRUCTIVE SLEEP APNEA): Primary | ICD-10-CM

## 2022-05-04 ENCOUNTER — PES CALL (OUTPATIENT)
Dept: ADMINISTRATIVE | Facility: CLINIC | Age: 72
End: 2022-05-04
Payer: MEDICARE

## 2022-05-09 ENCOUNTER — PATIENT MESSAGE (OUTPATIENT)
Dept: SMOKING CESSATION | Facility: CLINIC | Age: 72
End: 2022-05-09
Payer: MEDICARE

## 2022-06-20 ENCOUNTER — PATIENT MESSAGE (OUTPATIENT)
Dept: FAMILY MEDICINE | Facility: CLINIC | Age: 72
End: 2022-06-20
Payer: MEDICARE

## 2022-06-20 DIAGNOSIS — Z12.31 BREAST CANCER SCREENING BY MAMMOGRAM: ICD-10-CM

## 2022-06-20 DIAGNOSIS — R07.89 OTHER CHEST PAIN: Primary | ICD-10-CM

## 2022-06-20 NOTE — TELEPHONE ENCOUNTER
Pt is requesting an EKG prior to coming in for her next appt .She is not having CP, but states since she has started using cpap and having to wear a mask that she has been having some chest discomfort sometimes in the morning after wearing the cpap all night. . Pt states when the pressure on the machine was higher it made her chest even more uncomfortable so that's why she thinks its the cpap. No chest discomfort any other time of the day. No dizziness. No other symptoms.    Instructed pt she can call and schedule mammogram at her convenience.

## 2022-07-12 ENCOUNTER — CLINICAL SUPPORT (OUTPATIENT)
Dept: CARDIOLOGY | Facility: HOSPITAL | Age: 72
End: 2022-07-12
Attending: FAMILY MEDICINE
Payer: MEDICARE

## 2022-07-12 ENCOUNTER — HOSPITAL ENCOUNTER (OUTPATIENT)
Dept: RADIOLOGY | Facility: HOSPITAL | Age: 72
Discharge: HOME | End: 2022-07-12
Attending: FAMILY MEDICINE
Payer: MEDICARE

## 2022-07-12 DIAGNOSIS — R07.89 OTHER CHEST PAIN: ICD-10-CM

## 2022-07-12 DIAGNOSIS — Z78.0 MENOPAUSE: ICD-10-CM

## 2022-07-12 PROCEDURE — 77080 DXA BONE DENSITY AXIAL: CPT | Mod: 26,,, | Performed by: RADIOLOGY

## 2022-07-12 PROCEDURE — 93010 ELECTROCARDIOGRAM REPORT: CPT | Mod: ,,, | Performed by: INTERNAL MEDICINE

## 2022-07-12 PROCEDURE — 93010 EKG 12-LEAD: ICD-10-PCS | Mod: ,,, | Performed by: INTERNAL MEDICINE

## 2022-07-12 PROCEDURE — 77080 DEXA BONE DENSITY SPINE HIP: ICD-10-PCS | Mod: 26,,, | Performed by: RADIOLOGY

## 2022-07-12 PROCEDURE — 77080 DXA BONE DENSITY AXIAL: CPT | Mod: TC,PO

## 2022-07-12 PROCEDURE — 93005 ELECTROCARDIOGRAM TRACING: CPT | Mod: PO

## 2022-07-19 ENCOUNTER — OFFICE VISIT (OUTPATIENT)
Dept: FAMILY MEDICINE | Facility: CLINIC | Age: 72
End: 2022-07-19
Payer: MEDICARE

## 2022-07-19 VITALS
BODY MASS INDEX: 23.98 KG/M2 | OXYGEN SATURATION: 96 % | HEIGHT: 64 IN | WEIGHT: 140.44 LBS | HEART RATE: 74 BPM | TEMPERATURE: 98 F | RESPIRATION RATE: 18 BRPM | DIASTOLIC BLOOD PRESSURE: 60 MMHG | SYSTOLIC BLOOD PRESSURE: 122 MMHG

## 2022-07-19 DIAGNOSIS — R51.9 CHRONIC DAILY HEADACHE: ICD-10-CM

## 2022-07-19 DIAGNOSIS — E78.5 HYPERLIPIDEMIA LDL GOAL <100: ICD-10-CM

## 2022-07-19 DIAGNOSIS — E11.9 CONTROLLED TYPE 2 DIABETES MELLITUS WITHOUT COMPLICATION, WITHOUT LONG-TERM CURRENT USE OF INSULIN: Primary | ICD-10-CM

## 2022-07-19 PROCEDURE — 3072F PR LOW RISK FOR RETINOPATHY: ICD-10-PCS | Mod: CPTII,S$GLB,, | Performed by: FAMILY MEDICINE

## 2022-07-19 PROCEDURE — 3044F HG A1C LEVEL LT 7.0%: CPT | Mod: CPTII,S$GLB,, | Performed by: FAMILY MEDICINE

## 2022-07-19 PROCEDURE — 1160F RVW MEDS BY RX/DR IN RCRD: CPT | Mod: CPTII,S$GLB,, | Performed by: FAMILY MEDICINE

## 2022-07-19 PROCEDURE — 99999 PR PBB SHADOW E&M-EST. PATIENT-LVL III: CPT | Mod: PBBFAC,,, | Performed by: FAMILY MEDICINE

## 2022-07-19 PROCEDURE — 1101F PT FALLS ASSESS-DOCD LE1/YR: CPT | Mod: CPTII,S$GLB,, | Performed by: FAMILY MEDICINE

## 2022-07-19 PROCEDURE — 99214 PR OFFICE/OUTPT VISIT, EST, LEVL IV, 30-39 MIN: ICD-10-PCS | Mod: S$GLB,,, | Performed by: FAMILY MEDICINE

## 2022-07-19 PROCEDURE — 3008F BODY MASS INDEX DOCD: CPT | Mod: CPTII,S$GLB,, | Performed by: FAMILY MEDICINE

## 2022-07-19 PROCEDURE — 3060F POS MICROALBUMINURIA REV: CPT | Mod: CPTII,S$GLB,, | Performed by: FAMILY MEDICINE

## 2022-07-19 PROCEDURE — 3066F NEPHROPATHY DOC TX: CPT | Mod: CPTII,S$GLB,, | Performed by: FAMILY MEDICINE

## 2022-07-19 PROCEDURE — 3074F PR MOST RECENT SYSTOLIC BLOOD PRESSURE < 130 MM HG: ICD-10-PCS | Mod: CPTII,S$GLB,, | Performed by: FAMILY MEDICINE

## 2022-07-19 PROCEDURE — 1159F MED LIST DOCD IN RCRD: CPT | Mod: CPTII,S$GLB,, | Performed by: FAMILY MEDICINE

## 2022-07-19 PROCEDURE — 1126F PR PAIN SEVERITY QUANTIFIED, NO PAIN PRESENT: ICD-10-PCS | Mod: CPTII,S$GLB,, | Performed by: FAMILY MEDICINE

## 2022-07-19 PROCEDURE — 3044F PR MOST RECENT HEMOGLOBIN A1C LEVEL <7.0%: ICD-10-PCS | Mod: CPTII,S$GLB,, | Performed by: FAMILY MEDICINE

## 2022-07-19 PROCEDURE — 99214 OFFICE O/P EST MOD 30 MIN: CPT | Mod: S$GLB,,, | Performed by: FAMILY MEDICINE

## 2022-07-19 PROCEDURE — 3072F LOW RISK FOR RETINOPATHY: CPT | Mod: CPTII,S$GLB,, | Performed by: FAMILY MEDICINE

## 2022-07-19 PROCEDURE — 3066F PR DOCUMENTATION OF TREATMENT FOR NEPHROPATHY: ICD-10-PCS | Mod: CPTII,S$GLB,, | Performed by: FAMILY MEDICINE

## 2022-07-19 PROCEDURE — 1101F PR PT FALLS ASSESS DOC 0-1 FALLS W/OUT INJ PAST YR: ICD-10-PCS | Mod: CPTII,S$GLB,, | Performed by: FAMILY MEDICINE

## 2022-07-19 PROCEDURE — 1160F PR REVIEW ALL MEDS BY PRESCRIBER/CLIN PHARMACIST DOCUMENTED: ICD-10-PCS | Mod: CPTII,S$GLB,, | Performed by: FAMILY MEDICINE

## 2022-07-19 PROCEDURE — 3078F PR MOST RECENT DIASTOLIC BLOOD PRESSURE < 80 MM HG: ICD-10-PCS | Mod: CPTII,S$GLB,, | Performed by: FAMILY MEDICINE

## 2022-07-19 PROCEDURE — 1126F AMNT PAIN NOTED NONE PRSNT: CPT | Mod: CPTII,S$GLB,, | Performed by: FAMILY MEDICINE

## 2022-07-19 PROCEDURE — 99999 PR PBB SHADOW E&M-EST. PATIENT-LVL III: ICD-10-PCS | Mod: PBBFAC,,, | Performed by: FAMILY MEDICINE

## 2022-07-19 PROCEDURE — 3060F PR POS MICROALBUMINURIA RESULT DOCUMENTED/REVIEW: ICD-10-PCS | Mod: CPTII,S$GLB,, | Performed by: FAMILY MEDICINE

## 2022-07-19 PROCEDURE — 3008F PR BODY MASS INDEX (BMI) DOCUMENTED: ICD-10-PCS | Mod: CPTII,S$GLB,, | Performed by: FAMILY MEDICINE

## 2022-07-19 PROCEDURE — 3078F DIAST BP <80 MM HG: CPT | Mod: CPTII,S$GLB,, | Performed by: FAMILY MEDICINE

## 2022-07-19 PROCEDURE — 3288F FALL RISK ASSESSMENT DOCD: CPT | Mod: CPTII,S$GLB,, | Performed by: FAMILY MEDICINE

## 2022-07-19 PROCEDURE — 3074F SYST BP LT 130 MM HG: CPT | Mod: CPTII,S$GLB,, | Performed by: FAMILY MEDICINE

## 2022-07-19 PROCEDURE — 3288F PR FALLS RISK ASSESSMENT DOCUMENTED: ICD-10-PCS | Mod: CPTII,S$GLB,, | Performed by: FAMILY MEDICINE

## 2022-07-19 PROCEDURE — 1159F PR MEDICATION LIST DOCUMENTED IN MEDICAL RECORD: ICD-10-PCS | Mod: CPTII,S$GLB,, | Performed by: FAMILY MEDICINE

## 2022-07-19 NOTE — PROGRESS NOTES
Subjective:       Patient ID: Eulalia Villarreal is a 72 y.o. female.    Chief Complaint: Diabetes (6 month follow up, Physical)    Here for 6 month f/u on diabetes    DM2 - taking metformin 500mg BID  HLD - taking Crestor 10mg daily with CoQ 10  Daily HA - taking tylenol PRN; using voltaren topical at bedtime  Sleep apnea - wearing CPAP nightly  saw podiatry and was diagnosed with neuropathy. Taking B12, D and ALA          Diabetes  Hypoglycemia symptoms include headaches (posterior neck with radiation into the scalp.). Pertinent negatives for hypoglycemia include no dizziness. Pertinent negatives for diabetes include no chest pain and no weakness.   Follow-up  Associated symptoms include arthralgias (hands) and headaches (posterior neck with radiation into the scalp.). Pertinent negatives include no abdominal pain, chest pain, chills, coughing, fever, nausea, neck pain, numbness, rash, sore throat, vomiting or weakness.   Headache   Pertinent negatives include no abdominal pain, coughing, dizziness, eye pain, fever, nausea, neck pain, numbness, sore throat, vomiting or weakness.   Hip Pain   Pertinent negatives include no numbness.       Past Medical History:   Diagnosis Date    Arthritis     hands    Diabetes mellitus     Herniated disc     L5    Hyperlipidemia        Past Surgical History:   Procedure Laterality Date    ADENOIDECTOMY      BACK SURGERY  1982    discectomy    [facility]/7/[facility]    TONSILLECTOMY      WISDOM TOOTH EXTRACTION         Review of patient's allergies indicates:   Allergen Reactions    Statins-hmg-coa reductase inhibitors Other (See Comments)     Severe pain to her left side    Meperidine      Other reaction(s): hyper    Sulfa (sulfonamide antibiotics) Rash     Other reaction(s): Itching       Social History     Socioeconomic History    Marital status:    Occupational History    Occupation: retired    Tobacco Use    Smoking status:  Never Smoker    Smokeless tobacco: Never Used   Substance and Sexual Activity    Alcohol use: No     Comment: rare    Drug use: No    Sexual activity: Never       Current Outpatient Medications on File Prior to Visit   Medication Sig Dispense Refill    alpha lipoic acid 200 mg Cap Take 200 mg by mouth once daily.      ascorbic acid, vitamin C, (VITAMIN C) 500 MG tablet Take 1 tablet by mouth Daily.      diclofenac sodium (VOLTAREN) 1 % Gel Apply 2 g topically 4 (four) times daily. To hands as needed 100 g 1    fluocinonide (LIDEX) 0.05 % external solution Apply topically 2 (two) times daily. 60 mL 2    glucosamine-D3-Boswellia serr 1,500-400-100 mg-unit-mg Tab Take by mouth.      ketoconazole (NIZORAL) 2 % shampoo Apply topically twice a week. 120 mL 4    metFORMIN (GLUCOPHAGE) 500 MG tablet Take 1 tablet (500 mg total) by mouth 2 (two) times daily with meals. 180 tablet 3    rosuvastatin (CRESTOR) 10 MG tablet Take 1 tablet (10 mg total) by mouth once daily. 90 tablet 3    UBIDECARENONE (CO Q-10 ORAL) Take by mouth.      vitamin D 185 MG Tab Take 2,000 mg by mouth once daily.      VITAMIN E ACETATE (VITAMIN E ORAL) Take 1 capsule by mouth Daily.      FLUAD QUAD 2021-22,65Y UP,,PF, 60 mcg (15 mcg x 4)/0.5 mL Syrg       tiZANidine (ZANAFLEX) 2 MG tablet TAKE 2 TABLETS (4 MG TOTAL) BY MOUTH NIGHTLY AS NEEDED (HEADACHE). (Patient not taking: Reported on 7/19/2022) 30 tablet 1     No current facility-administered medications on file prior to visit.       Family History   Problem Relation Age of Onset    Diabetes Mother     Coronary artery disease Mother     Diabetes Son     Cancer Maternal Aunt     Cerebral aneurysm Father     Coronary artery disease Sister     Coronary artery disease Brother     Heart disease Neg Hx     Glaucoma Neg Hx     Retinal detachment Neg Hx     Macular degeneration Neg Hx     Melanoma Neg Hx        Review of Systems   Constitutional: Negative for appetite change,  "chills, fever and unexpected weight change.   HENT: Negative for sore throat and trouble swallowing.    Eyes: Negative for pain and visual disturbance.   Respiratory: Negative for cough, shortness of breath and wheezing.    Cardiovascular: Negative for chest pain and palpitations.   Gastrointestinal: Negative for abdominal pain, blood in stool, diarrhea, nausea and vomiting.   Genitourinary: Negative for difficulty urinating, dysuria and hematuria.   Musculoskeletal: Positive for arthralgias (hands). Negative for gait problem and neck pain.   Skin: Negative for rash and wound.   Neurological: Positive for headaches (posterior neck with radiation into the scalp.). Negative for dizziness, weakness and numbness.   Hematological: Negative for adenopathy.   Psychiatric/Behavioral: Negative for dysphoric mood.       Objective:      /60   Pulse 74   Temp 97.9 °F (36.6 °C) (Oral)   Resp 18   Ht 5' 4" (1.626 m)   Wt 63.7 kg (140 lb 6.9 oz)   SpO2 96%   BMI 24.11 kg/m²   Physical Exam  Constitutional:       Appearance: Normal appearance. She is well-developed.   HENT:      Head: Normocephalic.      Mouth/Throat:      Pharynx: No oropharyngeal exudate or posterior oropharyngeal erythema.   Eyes:      Conjunctiva/sclera: Conjunctivae normal.      Pupils: Pupils are equal, round, and reactive to light.   Neck:      Thyroid: No thyromegaly.   Cardiovascular:      Rate and Rhythm: Normal rate and regular rhythm.      Heart sounds: Normal heart sounds, S1 normal and S2 normal. No murmur heard.    No friction rub. No gallop.   Pulmonary:      Effort: Pulmonary effort is normal.      Breath sounds: Normal breath sounds. No wheezing or rales.   Abdominal:      General: Bowel sounds are normal.      Palpations: Abdomen is soft.   Musculoskeletal:      Cervical back: Normal range of motion and neck supple.      Lumbar back: Normal.      Left hip: Normal.      Right lower leg: No edema.      Left lower leg: No edema. "   Lymphadenopathy:      Cervical: No cervical adenopathy.   Skin:     General: Skin is warm.      Findings: No rash.   Neurological:      Mental Status: She is alert and oriented to person, place, and time.      Cranial Nerves: No cranial nerve deficit.      Gait: Gait normal.       Foot exam: inspection normal, sensation intact; 2+ DP pulses bilaterally    Results for orders placed or performed in visit on 07/12/22   Hemoglobin A1C   Result Value Ref Range    Hemoglobin A1C 6.3 (H) 4.0 - 5.6 %    Estimated Avg Glucose 134 (H) 68 - 131 mg/dL   Comprehensive Metabolic Panel   Result Value Ref Range    Sodium 139 136 - 145 mmol/L    Potassium 4.5 3.5 - 5.1 mmol/L    Chloride 106 95 - 110 mmol/L    CO2 26 23 - 29 mmol/L    Glucose 125 (H) 70 - 110 mg/dL    BUN 16 8 - 23 mg/dL    Creatinine 0.8 0.5 - 1.4 mg/dL    Calcium 9.1 8.7 - 10.5 mg/dL    Total Protein 7.0 6.0 - 8.4 g/dL    Albumin 3.8 3.5 - 5.2 g/dL    Total Bilirubin 1.4 (H) 0.1 - 1.0 mg/dL    Alkaline Phosphatase 74 55 - 135 U/L    AST 17 10 - 40 U/L    ALT 16 10 - 44 U/L    Anion Gap 7 (L) 8 - 16 mmol/L    eGFR if African American >60.0 >60 mL/min/1.73 m^2    eGFR if non African American >60.0 >60 mL/min/1.73 m^2       Assessment:       1. Controlled type 2 diabetes mellitus without complication, without long-term current use of insulin    2. Hyperlipidemia LDL goal <100    3. Chronic daily headache        Plan:       Controlled type 2 diabetes mellitus without complication, without long-term current use of insulin  -     Hemoglobin A1C; Future; Expected date: 01/15/2023  -     Comprehensive Metabolic Panel; Future; Expected date: 01/15/2023  -     Lipid Panel; Future; Expected date: 01/15/2023  -     Microalbumin/Creatinine Ratio, Urine; Future; Expected date: 01/15/2023    Hyperlipidemia LDL goal <100    Chronic daily headache          Doing well  continue present meds  F/u 6 months with labs  Counseled on regular exercise, maintenance of a healthy weight,  balanced diet rich in fruits/vegetables and lean protein, and avoidance of unhealthy habits like smoking and excessive alcohol intake.

## 2022-08-08 ENCOUNTER — HOSPITAL ENCOUNTER (OUTPATIENT)
Dept: RADIOLOGY | Facility: HOSPITAL | Age: 72
Discharge: HOME | End: 2022-08-08
Attending: FAMILY MEDICINE
Payer: MEDICARE

## 2022-08-08 DIAGNOSIS — Z12.31 BREAST CANCER SCREENING BY MAMMOGRAM: ICD-10-CM

## 2022-08-08 PROCEDURE — 77067 SCR MAMMO BI INCL CAD: CPT | Mod: TC,PO

## 2022-08-08 PROCEDURE — 77063 BREAST TOMOSYNTHESIS BI: CPT | Mod: 26,,, | Performed by: RADIOLOGY

## 2022-08-08 PROCEDURE — 77067 MAMMO DIGITAL SCREENING BILAT WITH TOMO: ICD-10-PCS | Mod: 26,,, | Performed by: RADIOLOGY

## 2022-08-08 PROCEDURE — 77067 SCR MAMMO BI INCL CAD: CPT | Mod: 26,,, | Performed by: RADIOLOGY

## 2022-08-08 PROCEDURE — 77063 MAMMO DIGITAL SCREENING BILAT WITH TOMO: ICD-10-PCS | Mod: 26,,, | Performed by: RADIOLOGY

## 2023-01-06 ENCOUNTER — PATIENT OUTREACH (OUTPATIENT)
Dept: ADMINISTRATIVE | Facility: HOSPITAL | Age: 73
End: 2023-01-06
Payer: MEDICARE

## 2023-01-06 NOTE — PROGRESS NOTES
2023 Care Everywhere updates requested and reviewed.  Immunizations reconciled. Media reports reviewed.  Duplicate HM overrides and  orders removed.  Overdue HM topic chart audit and/or requested.  Overdue lab testing linked to upcoming lab appointments if applies.  DIABETIC TESTING DUE    SCHEDULED    Future Appointments   Date Time Provider Department Center   2023  7:10 AM LABPAMELA [facility] [place]   2023  7:30 AM URINE [facility] [place]   2023  9:40 AM Sunny Gee MD [facility]

## 2023-01-12 ENCOUNTER — LAB VISIT (OUTPATIENT)
Dept: LAB | Facility: HOSPITAL | Age: 73
End: 2023-01-12
Attending: FAMILY MEDICINE
Payer: MEDICARE

## 2023-01-12 DIAGNOSIS — E11.9 CONTROLLED TYPE 2 DIABETES MELLITUS WITHOUT COMPLICATION, WITHOUT LONG-TERM CURRENT USE OF INSULIN: ICD-10-CM

## 2023-01-12 LAB
ALBUMIN/CREAT UR: NORMAL UG/MG (ref 0–30)
CREAT UR-MCNC: 52 MG/DL (ref 15–325)
MICROALBUMIN UR DL<=1MG/L-MCNC: <5 UG/ML

## 2023-01-12 PROCEDURE — 82570 ASSAY OF URINE CREATININE: CPT | Performed by: FAMILY MEDICINE

## 2023-01-19 ENCOUNTER — OFFICE VISIT (OUTPATIENT)
Dept: FAMILY MEDICINE | Facility: CLINIC | Age: 73
End: 2023-01-19
Payer: MEDICARE

## 2023-01-19 VITALS
SYSTOLIC BLOOD PRESSURE: 130 MMHG | TEMPERATURE: 98 F | BODY MASS INDEX: 24.69 KG/M2 | HEART RATE: 70 BPM | WEIGHT: 144.63 LBS | RESPIRATION RATE: 18 BRPM | DIASTOLIC BLOOD PRESSURE: 70 MMHG | OXYGEN SATURATION: 96 % | HEIGHT: 64 IN

## 2023-01-19 DIAGNOSIS — R51.9 CHRONIC DAILY HEADACHE: ICD-10-CM

## 2023-01-19 DIAGNOSIS — E11.49 TYPE II DIABETES MELLITUS WITH NEUROLOGICAL MANIFESTATIONS: ICD-10-CM

## 2023-01-19 DIAGNOSIS — E78.5 HYPERLIPIDEMIA LDL GOAL <100: ICD-10-CM

## 2023-01-19 DIAGNOSIS — E11.9 CONTROLLED TYPE 2 DIABETES MELLITUS WITHOUT COMPLICATION, WITHOUT LONG-TERM CURRENT USE OF INSULIN: Primary | ICD-10-CM

## 2023-01-19 DIAGNOSIS — K21.9 GASTROESOPHAGEAL REFLUX DISEASE, UNSPECIFIED WHETHER ESOPHAGITIS PRESENT: ICD-10-CM

## 2023-01-19 PROCEDURE — 99214 PR OFFICE/OUTPT VISIT, EST, LEVL IV, 30-39 MIN: ICD-10-PCS | Mod: S$GLB,,, | Performed by: FAMILY MEDICINE

## 2023-01-19 PROCEDURE — 1160F RVW MEDS BY RX/DR IN RCRD: CPT | Mod: CPTII,S$GLB,, | Performed by: FAMILY MEDICINE

## 2023-01-19 PROCEDURE — 99499 UNLISTED E&M SERVICE: CPT | Mod: ,,, | Performed by: FAMILY MEDICINE

## 2023-01-19 PROCEDURE — 99214 OFFICE O/P EST MOD 30 MIN: CPT | Mod: S$GLB,,, | Performed by: FAMILY MEDICINE

## 2023-01-19 PROCEDURE — 3044F PR MOST RECENT HEMOGLOBIN A1C LEVEL <7.0%: ICD-10-PCS | Mod: CPTII,S$GLB,, | Performed by: FAMILY MEDICINE

## 2023-01-19 PROCEDURE — 99999 PR PBB SHADOW E&M-EST. PATIENT-LVL III: ICD-10-PCS | Mod: PBBFAC,,, | Performed by: FAMILY MEDICINE

## 2023-01-19 PROCEDURE — 3288F FALL RISK ASSESSMENT DOCD: CPT | Mod: CPTII,S$GLB,, | Performed by: FAMILY MEDICINE

## 2023-01-19 PROCEDURE — 1159F MED LIST DOCD IN RCRD: CPT | Mod: CPTII,S$GLB,, | Performed by: FAMILY MEDICINE

## 2023-01-19 PROCEDURE — 1101F PT FALLS ASSESS-DOCD LE1/YR: CPT | Mod: CPTII,S$GLB,, | Performed by: FAMILY MEDICINE

## 2023-01-19 PROCEDURE — 3008F BODY MASS INDEX DOCD: CPT | Mod: CPTII,S$GLB,, | Performed by: FAMILY MEDICINE

## 2023-01-19 PROCEDURE — 3008F PR BODY MASS INDEX (BMI) DOCUMENTED: ICD-10-PCS | Mod: CPTII,S$GLB,, | Performed by: FAMILY MEDICINE

## 2023-01-19 PROCEDURE — 3075F PR MOST RECENT SYSTOLIC BLOOD PRESS GE 130-139MM HG: ICD-10-PCS | Mod: CPTII,S$GLB,, | Performed by: FAMILY MEDICINE

## 2023-01-19 PROCEDURE — 1159F PR MEDICATION LIST DOCUMENTED IN MEDICAL RECORD: ICD-10-PCS | Mod: CPTII,S$GLB,, | Performed by: FAMILY MEDICINE

## 2023-01-19 PROCEDURE — 99999 PR PBB SHADOW E&M-EST. PATIENT-LVL III: CPT | Mod: PBBFAC,,, | Performed by: FAMILY MEDICINE

## 2023-01-19 PROCEDURE — 3078F PR MOST RECENT DIASTOLIC BLOOD PRESSURE < 80 MM HG: ICD-10-PCS | Mod: CPTII,S$GLB,, | Performed by: FAMILY MEDICINE

## 2023-01-19 PROCEDURE — 3066F NEPHROPATHY DOC TX: CPT | Mod: CPTII,S$GLB,, | Performed by: FAMILY MEDICINE

## 2023-01-19 PROCEDURE — 99499 RISK ADDL DX/OHS AUDIT: ICD-10-PCS | Mod: ,,, | Performed by: FAMILY MEDICINE

## 2023-01-19 PROCEDURE — 3061F NEG MICROALBUMINURIA REV: CPT | Mod: CPTII,S$GLB,, | Performed by: FAMILY MEDICINE

## 2023-01-19 PROCEDURE — 1160F PR REVIEW ALL MEDS BY PRESCRIBER/CLIN PHARMACIST DOCUMENTED: ICD-10-PCS | Mod: CPTII,S$GLB,, | Performed by: FAMILY MEDICINE

## 2023-01-19 PROCEDURE — 3288F PR FALLS RISK ASSESSMENT DOCUMENTED: ICD-10-PCS | Mod: CPTII,S$GLB,, | Performed by: FAMILY MEDICINE

## 2023-01-19 PROCEDURE — 3061F PR NEG MICROALBUMINURIA RESULT DOCUMENTED/REVIEW: ICD-10-PCS | Mod: CPTII,S$GLB,, | Performed by: FAMILY MEDICINE

## 2023-01-19 PROCEDURE — 3044F HG A1C LEVEL LT 7.0%: CPT | Mod: CPTII,S$GLB,, | Performed by: FAMILY MEDICINE

## 2023-01-19 PROCEDURE — 1125F PR PAIN SEVERITY QUANTIFIED, PAIN PRESENT: ICD-10-PCS | Mod: CPTII,S$GLB,, | Performed by: FAMILY MEDICINE

## 2023-01-19 PROCEDURE — 1101F PR PT FALLS ASSESS DOC 0-1 FALLS W/OUT INJ PAST YR: ICD-10-PCS | Mod: CPTII,S$GLB,, | Performed by: FAMILY MEDICINE

## 2023-01-19 PROCEDURE — 3078F DIAST BP <80 MM HG: CPT | Mod: CPTII,S$GLB,, | Performed by: FAMILY MEDICINE

## 2023-01-19 PROCEDURE — 3066F PR DOCUMENTATION OF TREATMENT FOR NEPHROPATHY: ICD-10-PCS | Mod: CPTII,S$GLB,, | Performed by: FAMILY MEDICINE

## 2023-01-19 PROCEDURE — 3075F SYST BP GE 130 - 139MM HG: CPT | Mod: CPTII,S$GLB,, | Performed by: FAMILY MEDICINE

## 2023-01-19 PROCEDURE — 1125F AMNT PAIN NOTED PAIN PRSNT: CPT | Mod: CPTII,S$GLB,, | Performed by: FAMILY MEDICINE

## 2023-01-19 RX ORDER — FAMOTIDINE 10 MG/1
10 TABLET ORAL 2 TIMES DAILY
COMMUNITY

## 2023-01-19 RX ORDER — INFLUENZA A VIRUS A/VICTORIA/2570/2019 IVR-215 (H1N1) ANTIGEN (FORMALDEHYDE INACTIVATED), INFLUENZA A VIRUS A/DARWIN/6/2021 IVR-227 (H3N2) ANTIGEN (FORMALDEHYDE INACTIVATED), INFLUENZA B VIRUS B/AUSTRIA/1359417/2021 BVR-26 ANTIGEN (FORMALDEHYDE INACTIVATED), INFLUENZA B VIRUS B/PHUKET/3073/2013 BVR-1B ANTIGEN (FORMALDEHYDE INACTIVATED) 15; 15; 15; 15 UG/.5ML; UG/.5ML; UG/.5ML; UG/.5ML
INJECTION, SUSPENSION INTRAMUSCULAR
COMMUNITY
Start: 2022-10-21

## 2023-01-19 RX ORDER — METFORMIN HYDROCHLORIDE 500 MG/1
500 TABLET ORAL 2 TIMES DAILY WITH MEALS
Qty: 180 TABLET | Refills: 3 | Status: SHIPPED | OUTPATIENT
Start: 2023-01-19 | End: 2024-01-24 | Stop reason: SDUPTHER

## 2023-01-19 RX ORDER — ROSUVASTATIN CALCIUM 10 MG/1
10 TABLET, COATED ORAL DAILY
Qty: 90 TABLET | Refills: 3 | Status: SHIPPED | OUTPATIENT
Start: 2023-01-19 | End: 2024-01-24 | Stop reason: SDUPTHER

## 2023-01-19 NOTE — PROGRESS NOTES
Subjective:       Patient ID: Eulalia Villarreal is a 72 y.o. female.    Chief Complaint: Diabetes (6 month follow up with labs )    Here for 6 month f/u on diabetes    C/o some epigasrtic fullness and reflux for the last few weeks.  This is worse after meals.  + hunger, no nausea  Taking famotidine 10mg    DM2 - taking metformin 500mg BID  HLD - taking Crestor 10mg daily with CoQ 10  Daily HA - taking tylenol PRN; using voltaren topical at bedtime  Sleep apnea - wearing CPAP nightly  Heand arthritis - some chronic thumb pain with activity  saw podiatry and was diagnosed with neuropathy. Taking B12, D and ALA          Diabetes  Hypoglycemia symptoms include headaches (posterior neck with radiation into the scalp.). Pertinent negatives for hypoglycemia include no dizziness. Pertinent negatives for diabetes include no chest pain and no weakness.   Follow-up  Associated symptoms include arthralgias (hands), headaches (posterior neck with radiation into the scalp.) and numbness. Pertinent negatives include no abdominal pain, chest pain, chills, coughing, fever, nausea, neck pain, rash, sore throat, vomiting or weakness.   Headache   Associated symptoms include numbness. Pertinent negatives include no abdominal pain, coughing, dizziness, eye pain, fever, nausea, neck pain, sore throat, vomiting or weakness.   Hip Pain   Associated symptoms include numbness.     Past Medical History:   Diagnosis Date    Arthritis     hands    Diabetes mellitus     Herniated disc     L5    Hyperlipidemia        Past Surgical History:   Procedure Laterality Date    ADENOIDECTOMY      BACK SURGERY  1982    discectomy    FLEXIBLE SIGMOIDOSCOPY  8/7/1990  [facility]    TONSILLECTOMY      WISDOM TOOTH EXTRACTION         Review of patient's allergies indicates:   Allergen Reactions    Statins-hmg-coa reductase inhibitors Other (See Comments)     Severe pain to her left side    Meperidine      Other reaction(s): hyper    Sulfa (sulfonamide antibiotics)  Rash     Other reaction(s): Itching       Social History     Socioeconomic History    Marital status:    Occupational History    Occupation: retired    Tobacco Use    Smoking status: Never    Smokeless tobacco: Never   Substance and Sexual Activity    Alcohol use: No     Comment: rare    Drug use: No    Sexual activity: Never       Current Outpatient Medications on File Prior to Visit   Medication Sig Dispense Refill    alpha lipoic acid 200 mg Cap Take 200 mg by mouth once daily.      ascorbic acid, vitamin C, (VITAMIN C) 500 MG tablet Take 1 tablet by mouth Daily.      diclofenac sodium (VOLTAREN) 1 % Gel Apply 2 g topically 4 (four) times daily. To hands as needed 100 g 1    famotidine (PEPCID) 10 MG tablet Take 10 mg by mouth 2 (two) times daily.      glucosamine-D3-Boswellia serr 1,500-400-100 mg-unit-mg Tab Take by mouth.      ketoconazole (NIZORAL) 2 % shampoo Apply topically twice a week. 120 mL 4    UBIDECARENONE (CO Q-10 ORAL) Take by mouth.      vitamin D 185 MG Tab Take 2,000 mg by mouth once daily.      VITAMIN E ACETATE (VITAMIN E ORAL) Take 1 capsule by mouth Daily.      FLUAD QUAD 2021-22,65Y UP,,PF, 60 mcg (15 mcg x 4)/0.5 mL Syrg       FLUAD QUAD 2022-23,65Y UP,,PF, 60 mcg (15 mcg x 4)/0.5 mL Syrg       fluocinonide (LIDEX) 0.05 % external solution Apply topically 2 (two) times daily. (Patient not taking: Reported on 1/19/2023) 60 mL 2    tiZANidine (ZANAFLEX) 2 MG tablet TAKE 2 TABLETS (4 MG TOTAL) BY MOUTH NIGHTLY AS NEEDED (HEADACHE). (Patient not taking: Reported on 7/19/2022) 30 tablet 1     No current facility-administered medications on file prior to visit.       Family History   Problem Relation Age of Onset    Diabetes Mother     Coronary artery disease Mother     Diabetes Son     Cancer Maternal Aunt     Cerebral aneurysm Father     Coronary artery disease Sister     Coronary artery disease Brother     Heart disease Neg Hx     Glaucoma Neg Hx     Retinal detachment Neg  "Hx     Macular degeneration Neg Hx     Melanoma Neg Hx        Review of Systems   Constitutional:  Negative for appetite change, chills, fever and unexpected weight change.   HENT:  Negative for sore throat and trouble swallowing.    Eyes:  Negative for pain and visual disturbance.   Respiratory:  Negative for cough, shortness of breath and wheezing.    Cardiovascular:  Negative for chest pain and palpitations.   Gastrointestinal:  Negative for abdominal pain, blood in stool, diarrhea, nausea and vomiting.   Genitourinary:  Negative for difficulty urinating, dysuria and hematuria.   Musculoskeletal:  Positive for arthralgias (hands). Negative for gait problem and neck pain.   Skin:  Negative for rash and wound.   Neurological:  Positive for numbness and headaches (posterior neck with radiation into the scalp.). Negative for dizziness and weakness.   Hematological:  Negative for adenopathy.   Psychiatric/Behavioral:  Negative for dysphoric mood.      Objective:      /70   Pulse 70   Temp 97.6 °F (36.4 °C) (Oral)   Resp 18   Ht 5' 4" (1.626 m)   Wt 65.6 kg (144 lb 10 oz)   SpO2 96%   BMI 24.82 kg/m²   Physical Exam  Constitutional:       Appearance: Normal appearance. She is well-developed.   HENT:      Head: Normocephalic.      Mouth/Throat:      Pharynx: No oropharyngeal exudate or posterior oropharyngeal erythema.   Eyes:      Conjunctiva/sclera: Conjunctivae normal.      Pupils: Pupils are equal, round, and reactive to light.   Neck:      Thyroid: No thyromegaly.   Cardiovascular:      Rate and Rhythm: Normal rate and regular rhythm.      Heart sounds: Normal heart sounds, S1 normal and S2 normal. No murmur heard.    No friction rub. No gallop.   Pulmonary:      Effort: Pulmonary effort is normal.      Breath sounds: Normal breath sounds. No wheezing or rales.   Abdominal:      General: Bowel sounds are normal.      Palpations: Abdomen is soft.   Musculoskeletal:      Cervical back: Normal range of " motion and neck supple.      Lumbar back: Normal.      Left hip: Normal.      Right lower leg: No edema.      Left lower leg: No edema.   Lymphadenopathy:      Cervical: No cervical adenopathy.   Skin:     General: Skin is warm.      Findings: No rash.   Neurological:      Mental Status: She is alert and oriented to person, place, and time.      Cranial Nerves: No cranial nerve deficit.      Gait: Gait normal.     Foot exam: inspection normal, sensation intact; 2+ DP pulses bilaterally    Results for orders placed or performed in visit on 01/12/23   Microalbumin/Creatinine Ratio, Urine   Result Value Ref Range    Microalbumin, Urine <5.0 ug/mL    Creatinine, Urine 52.0 15.0 - 325.0 mg/dL    Microalb/Creat Ratio Unable to calculate 0.0 - 30.0 ug/mg     Labs 1/12/23 reviewed    Assessment:       1. Controlled type 2 diabetes mellitus without complication, without long-term current use of insulin    2. Hyperlipidemia LDL goal <100    3. Gastroesophageal reflux disease, unspecified whether esophagitis present    4. Type II diabetes mellitus with neurological manifestations    5. Chronic daily headache          Plan:       Controlled type 2 diabetes mellitus without complication, without long-term current use of insulin  -     Hemoglobin A1C; Future; Expected date: 07/18/2023  -     Comprehensive Metabolic Panel; Future; Expected date: 07/18/2023  -     metFORMIN (GLUCOPHAGE) 500 MG tablet; Take 1 tablet (500 mg total) by mouth 2 (two) times daily with meals.  Dispense: 180 tablet; Refill: 3    Hyperlipidemia LDL goal <100  -     rosuvastatin (CRESTOR) 10 MG tablet; Take 1 tablet (10 mg total) by mouth once daily.  Dispense: 90 tablet; Refill: 3    Gastroesophageal reflux disease, unspecified whether esophagitis present    Type II diabetes mellitus with neurological manifestations    Chronic daily headache          Increase Pepcid 20mg daily  continue other present meds  F/u 6 months with labs  Counseled on regular  exercise, maintenance of a healthy weight, balanced diet rich in fruits/vegetables and lean protein, and avoidance of unhealthy habits like smoking and excessive alcohol intake.

## 2023-01-20 ENCOUNTER — IMMUNIZATION (OUTPATIENT)
Dept: PHARMACY | Facility: CLINIC | Age: 73
End: 2023-01-20
Payer: MEDICARE

## 2023-01-20 DIAGNOSIS — Z23 NEED FOR VACCINATION: Primary | ICD-10-CM

## 2023-03-13 ENCOUNTER — PATIENT MESSAGE (OUTPATIENT)
Dept: FAMILY MEDICINE | Facility: CLINIC | Age: 73
End: 2023-03-13
Payer: MEDICARE

## 2023-05-25 ENCOUNTER — OFFICE VISIT (OUTPATIENT)
Dept: DERMATOLOGY | Facility: CLINIC | Age: 73
End: 2023-05-25
Payer: MEDICARE

## 2023-05-25 VITALS — BODY MASS INDEX: 24.59 KG/M2 | WEIGHT: 144 LBS | HEIGHT: 64 IN

## 2023-05-25 DIAGNOSIS — L91.8 INFLAMED SKIN TAG: ICD-10-CM
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
## 2023-05-25 DIAGNOSIS — L82.0 SEBORRHEIC KERATOSES, INFLAMED: ICD-10-CM

## 2023-05-25 PROCEDURE — 1126F AMNT PAIN NOTED NONE PRSNT: CPT | Mod: CPTII,S$GLB,, | Performed by: STUDENT IN AN ORGANIZED HEALTH CARE EDUCATION/TRAINING PROGRAM

## 2023-05-25 PROCEDURE — 99213 PR OFFICE/OUTPT VISIT, EST, LEVL III, 20-29 MIN: ICD-10-PCS | Mod: 25,S$GLB,, | Performed by: STUDENT IN AN ORGANIZED HEALTH CARE EDUCATION/TRAINING PROGRAM

## 2023-05-25 PROCEDURE — 1101F PR PT FALLS ASSESS DOC 0-1 FALLS W/OUT INJ PAST YR: ICD-10-PCS | Mod: CPTII,S$GLB,, | Performed by: STUDENT IN AN ORGANIZED HEALTH CARE EDUCATION/TRAINING PROGRAM

## 2023-05-25 PROCEDURE — 1160F RVW MEDS BY RX/DR IN RCRD: CPT | Mod: CPTII,S$GLB,, | Performed by: STUDENT IN AN ORGANIZED HEALTH CARE EDUCATION/TRAINING PROGRAM

## 2023-05-25 PROCEDURE — 1101F PT FALLS ASSESS-DOCD LE1/YR: CPT | Mod: CPTII,S$GLB,, | Performed by: STUDENT IN AN ORGANIZED HEALTH CARE EDUCATION/TRAINING PROGRAM

## 2023-05-25 PROCEDURE — 3008F BODY MASS INDEX DOCD: CPT | Mod: CPTII,S$GLB,, | Performed by: STUDENT IN AN ORGANIZED HEALTH CARE EDUCATION/TRAINING PROGRAM

## 2023-05-25 PROCEDURE — 3044F PR MOST RECENT HEMOGLOBIN A1C LEVEL <7.0%: ICD-10-PCS | Mod: CPTII,S$GLB,, | Performed by: STUDENT IN AN ORGANIZED HEALTH CARE EDUCATION/TRAINING PROGRAM

## 2023-05-25 PROCEDURE — 17110 PR DESTRUCTION BENIGN LESIONS UP TO 14: ICD-10-PCS | Mod: S$GLB,,, | Performed by: STUDENT IN AN ORGANIZED HEALTH CARE EDUCATION/TRAINING PROGRAM

## 2023-05-25 PROCEDURE — 3008F PR BODY MASS INDEX (BMI) DOCUMENTED: ICD-10-PCS | Mod: CPTII,S$GLB,, | Performed by: STUDENT IN AN ORGANIZED HEALTH CARE EDUCATION/TRAINING PROGRAM

## 2023-05-25 PROCEDURE — 3044F HG A1C LEVEL LT 7.0%: CPT | Mod: CPTII,S$GLB,, | Performed by: STUDENT IN AN ORGANIZED HEALTH CARE EDUCATION/TRAINING PROGRAM

## 2023-05-25 PROCEDURE — 3288F FALL RISK ASSESSMENT DOCD: CPT | Mod: CPTII,S$GLB,, | Performed by: STUDENT IN AN ORGANIZED HEALTH CARE EDUCATION/TRAINING PROGRAM

## 2023-05-25 PROCEDURE — 3066F PR DOCUMENTATION OF TREATMENT FOR NEPHROPATHY: ICD-10-PCS | Mod: CPTII,S$GLB,, | Performed by: STUDENT IN AN ORGANIZED HEALTH CARE EDUCATION/TRAINING PROGRAM

## 2023-05-25 PROCEDURE — 1126F PR PAIN SEVERITY QUANTIFIED, NO PAIN PRESENT: ICD-10-PCS | Mod: CPTII,S$GLB,, | Performed by: STUDENT IN AN ORGANIZED HEALTH CARE EDUCATION/TRAINING PROGRAM

## 2023-05-25 PROCEDURE — 3061F PR NEG MICROALBUMINURIA RESULT DOCUMENTED/REVIEW: ICD-10-PCS | Mod: CPTII,S$GLB,, | Performed by: STUDENT IN AN ORGANIZED HEALTH CARE EDUCATION/TRAINING PROGRAM

## 2023-05-25 PROCEDURE — 1160F PR REVIEW ALL MEDS BY PRESCRIBER/CLIN PHARMACIST DOCUMENTED: ICD-10-PCS | Mod: CPTII,S$GLB,, | Performed by: STUDENT IN AN ORGANIZED HEALTH CARE EDUCATION/TRAINING PROGRAM

## 2023-05-25 PROCEDURE — 1159F MED LIST DOCD IN RCRD: CPT | Mod: CPTII,S$GLB,, | Performed by: STUDENT IN AN ORGANIZED HEALTH CARE EDUCATION/TRAINING PROGRAM

## 2023-05-25 PROCEDURE — 3061F NEG MICROALBUMINURIA REV: CPT | Mod: CPTII,S$GLB,, | Performed by: STUDENT IN AN ORGANIZED HEALTH CARE EDUCATION/TRAINING PROGRAM

## 2023-05-25 PROCEDURE — 1159F PR MEDICATION LIST DOCUMENTED IN MEDICAL RECORD: ICD-10-PCS | Mod: CPTII,S$GLB,, | Performed by: STUDENT IN AN ORGANIZED HEALTH CARE EDUCATION/TRAINING PROGRAM

## 2023-05-25 PROCEDURE — 3288F PR FALLS RISK ASSESSMENT DOCUMENTED: ICD-10-PCS | Mod: CPTII,S$GLB,, | Performed by: STUDENT IN AN ORGANIZED HEALTH CARE EDUCATION/TRAINING PROGRAM

## 2023-05-25 PROCEDURE — 99213 OFFICE O/P EST LOW 20 MIN: CPT | Mod: 25,S$GLB,, | Performed by: STUDENT IN AN ORGANIZED HEALTH CARE EDUCATION/TRAINING PROGRAM

## 2023-05-25 PROCEDURE — 3066F NEPHROPATHY DOC TX: CPT | Mod: CPTII,S$GLB,, | Performed by: STUDENT IN AN ORGANIZED HEALTH CARE EDUCATION/TRAINING PROGRAM

## 2023-05-25 PROCEDURE — 17110 DESTRUCTION B9 LES UP TO 14: CPT | Mod: S$GLB,,, | Performed by: STUDENT IN AN ORGANIZED HEALTH CARE EDUCATION/TRAINING PROGRAM

## 2023-05-25 RX ORDER — FLUOCINONIDE 0.5 MG/G
CREAM TOPICAL 2 TIMES DAILY
Qty: 60 G | Refills: 0 | Status: SHIPPED | OUTPATIENT
Start: 2023-05-25

## 2023-05-25 NOTE — PATIENT INSTRUCTIONS

## 2023-05-25 NOTE — PROGRESS NOTES
Subjective:      Patient ID:  Eulalia Villarreal is a 72 y.o. female who presents for   Chief Complaint   Patient presents with    Spot     Left temple     LOV 8/26/21    Here today for c/o 2 spots on left temple x 2 months that itches  C/o spot on right thigh that itches x 3-4 weeks- after working in the yard  Would like to talk about removing skin tags    Has no hx of NMSC  Has no fhx of MM            Review of Systems   Constitutional:  Negative for fever, chills and fatigue.   Respiratory:  Negative for cough and shortness of breath.    Gastrointestinal:  Negative for nausea and vomiting.   Skin:  Positive for itching, activity-related sunscreen use and wears hat. Negative for rash, dry skin and daily sunscreen use.   Hematologic/Lymphatic: Bruises/bleeds easily.     Objective:   Physical Exam   Skin:   Areas Examined (abnormalities noted in diagram):   Head / Face Inspection Performed  Neck Inspection Performed  RLE Inspected               Diagram Legend     Erythematous scaling macule/papule c/w actinic keratosis       Vascular papule c/w angioma      Pigmented verrucoid papule/plaque c/w seborrheic keratosis      Yellow umbilicated papule c/w sebaceous hyperplasia      Irregularly shaped tan macule c/w lentigo     1-2 mm smooth white papules consistent with Milia      Movable subcutaneous cyst with punctum c/w epidermal inclusion cyst      Subcutaneous movable cyst c/w pilar cyst      Firm pink to brown papule c/w dermatofibroma      Pedunculated fleshy papule(s) c/w skin tag(s)      Evenly pigmented macule c/w junctional nevus     Mildly variegated pigmented, slightly irregular-bordered macule c/w mildly atypical nevus      Flesh colored to evenly pigmented papule c/w intradermal nevus       Pink pearly papule/plaque c/w basal cell carcinoma      Erythematous hyperkeratotic cursted plaque c/w SCC      Surgical scar with no sign of skin cancer recurrence      Open and closed comedones      Inflammatory papules  and pustules      Verrucoid papule consistent consistent with wart     Erythematous eczematous patches and plaques     Dystrophic onycholytic nail with subungual debris c/w onychomycosis     Umbilicated papule    Erythematous-base heme-crusted tan verrucoid plaque consistent with inflamed seborrheic keratosis     Erythematous Silvery Scaling Plaque c/w Psoriasis     See annotation      Assessment / Plan:        Arthropod bite, initial encounter  -     fluocinonide 0.05% (LIDEX) 0.05 % cream; Apply topically 2 (two) times daily.  Dispense: 60 g; Refill: 0    Seborrheic keratoses, inflamed  Cryosurgery procedure note:    Verbal consent from the patient is obtained. Liquid nitrogen cryosurgery is applied to 2 lesions to produce a freeze injury. The patient is aware that blisters may form and is instructed on wound care with gentle cleansing and use of vaseline ointment to keep moist until healed. The patient is supplied a handout on cryosurgery and is instructed to call if lesions do not completely resolve.    Inflamed skin tag  Cryosurgery procedure note:    Verbal consent from the patient is obtained. Liquid nitrogen cryosurgery is applied to 2 lesions to produce a freeze injury. The patient is aware that blisters may form and is instructed on wound care with gentle cleansing and use of vaseline ointment to keep moist until healed. The patient is supplied a handout on cryosurgery and is instructed to call if lesions do not completely resolve.             No follow-ups on file.

## 2023-07-17 ENCOUNTER — LAB VISIT (OUTPATIENT)
Dept: LAB | Facility: HOSPITAL | Age: 73
End: 2023-07-17
Attending: FAMILY MEDICINE
Payer: MEDICARE

## 2023-07-17 DIAGNOSIS — E11.9 CONTROLLED TYPE 2 DIABETES MELLITUS WITHOUT COMPLICATION, WITHOUT LONG-TERM CURRENT USE OF INSULIN: ICD-10-CM

## 2023-07-17 LAB
ALBUMIN SERPL BCP-MCNC: 3.9 G/DL (ref 3.5–5.2)
ALP SERPL-CCNC: 83 U/L (ref 55–135)
ALT SERPL W/O P-5'-P-CCNC: 20 U/L (ref 10–44)
ANION GAP SERPL CALC-SCNC: 9 MMOL/L (ref 8–16)
AST SERPL-CCNC: 21 U/L (ref 10–40)
BILIRUB SERPL-MCNC: 1.2 MG/DL (ref 0.1–1)
BUN SERPL-MCNC: 15 MG/DL (ref 8–23)
CALCIUM SERPL-MCNC: 9.4 MG/DL (ref 8.7–10.5)
CHLORIDE SERPL-SCNC: 105 MMOL/L (ref 95–110)
CO2 SERPL-SCNC: 27 MMOL/L (ref 23–29)
CREAT SERPL-MCNC: 0.8 MG/DL (ref 0.5–1.4)
EST. GFR  (NO RACE VARIABLE): >60 ML/MIN/1.73 M^2
ESTIMATED AVG GLUCOSE: 146 MG/DL (ref 68–131)
GLUCOSE SERPL-MCNC: 148 MG/DL (ref 70–110)
HBA1C MFR BLD: 6.7 % (ref 4–5.6)
POTASSIUM SERPL-SCNC: 5.3 MMOL/L (ref 3.5–5.1)
PROT SERPL-MCNC: 7.1 G/DL (ref 6–8.4)
SODIUM SERPL-SCNC: 141 MMOL/L (ref 136–145)

## 2023-07-17 PROCEDURE — 83036 HEMOGLOBIN GLYCOSYLATED A1C: CPT | Performed by: FAMILY MEDICINE

## 2023-07-17 PROCEDURE — 36415 COLL VENOUS BLD VENIPUNCTURE: CPT | Mod: PO | Performed by: FAMILY MEDICINE

## 2023-07-17 PROCEDURE — 80053 COMPREHEN METABOLIC PANEL: CPT | Performed by: FAMILY MEDICINE

## 2023-07-20 LAB
LEFT EYE DM RETINOPATHY: NEGATIVE
RIGHT EYE DM RETINOPATHY: NEGATIVE

## 2023-07-24 ENCOUNTER — OFFICE VISIT (OUTPATIENT)
Dept: FAMILY MEDICINE | Facility: CLINIC | Age: 73
End: 2023-07-24
Payer: MEDICARE

## 2023-07-24 VITALS
WEIGHT: 148.56 LBS | HEIGHT: 64 IN | RESPIRATION RATE: 18 BRPM | DIASTOLIC BLOOD PRESSURE: 74 MMHG | SYSTOLIC BLOOD PRESSURE: 126 MMHG | OXYGEN SATURATION: 97 % | BODY MASS INDEX: 25.36 KG/M2 | HEART RATE: 78 BPM | TEMPERATURE: 98 F

## 2023-07-24 DIAGNOSIS — E78.5 HYPERLIPIDEMIA LDL GOAL <100: ICD-10-CM

## 2023-07-24 DIAGNOSIS — Z12.31 BREAST CANCER SCREENING BY MAMMOGRAM: ICD-10-CM

## 2023-07-24 DIAGNOSIS — K21.9 GASTROESOPHAGEAL REFLUX DISEASE, UNSPECIFIED WHETHER ESOPHAGITIS PRESENT: ICD-10-CM

## 2023-07-24 DIAGNOSIS — E11.9 CONTROLLED TYPE 2 DIABETES MELLITUS WITHOUT COMPLICATION, WITHOUT LONG-TERM CURRENT USE OF INSULIN: Primary | ICD-10-CM

## 2023-07-24 PROCEDURE — 3061F PR NEG MICROALBUMINURIA RESULT DOCUMENTED/REVIEW: ICD-10-PCS | Mod: CPTII,S$GLB,, | Performed by: FAMILY MEDICINE

## 2023-07-24 PROCEDURE — 3008F BODY MASS INDEX DOCD: CPT | Mod: CPTII,S$GLB,, | Performed by: FAMILY MEDICINE

## 2023-07-24 PROCEDURE — 1126F AMNT PAIN NOTED NONE PRSNT: CPT | Mod: CPTII,S$GLB,, | Performed by: FAMILY MEDICINE

## 2023-07-24 PROCEDURE — 3066F NEPHROPATHY DOC TX: CPT | Mod: CPTII,S$GLB,, | Performed by: FAMILY MEDICINE

## 2023-07-24 PROCEDURE — 3044F PR MOST RECENT HEMOGLOBIN A1C LEVEL <7.0%: ICD-10-PCS | Mod: CPTII,S$GLB,, | Performed by: FAMILY MEDICINE

## 2023-07-24 PROCEDURE — 3044F HG A1C LEVEL LT 7.0%: CPT | Mod: CPTII,S$GLB,, | Performed by: FAMILY MEDICINE

## 2023-07-24 PROCEDURE — 99214 OFFICE O/P EST MOD 30 MIN: CPT | Mod: S$GLB,,, | Performed by: FAMILY MEDICINE

## 2023-07-24 PROCEDURE — 3061F NEG MICROALBUMINURIA REV: CPT | Mod: CPTII,S$GLB,, | Performed by: FAMILY MEDICINE

## 2023-07-24 PROCEDURE — 3288F FALL RISK ASSESSMENT DOCD: CPT | Mod: CPTII,S$GLB,, | Performed by: FAMILY MEDICINE

## 2023-07-24 PROCEDURE — 3288F PR FALLS RISK ASSESSMENT DOCUMENTED: ICD-10-PCS | Mod: CPTII,S$GLB,, | Performed by: FAMILY MEDICINE

## 2023-07-24 PROCEDURE — 3008F PR BODY MASS INDEX (BMI) DOCUMENTED: ICD-10-PCS | Mod: CPTII,S$GLB,, | Performed by: FAMILY MEDICINE

## 2023-07-24 PROCEDURE — 3078F DIAST BP <80 MM HG: CPT | Mod: CPTII,S$GLB,, | Performed by: FAMILY MEDICINE

## 2023-07-24 PROCEDURE — 1101F PT FALLS ASSESS-DOCD LE1/YR: CPT | Mod: CPTII,S$GLB,, | Performed by: FAMILY MEDICINE

## 2023-07-24 PROCEDURE — 1101F PR PT FALLS ASSESS DOC 0-1 FALLS W/OUT INJ PAST YR: ICD-10-PCS | Mod: CPTII,S$GLB,, | Performed by: FAMILY MEDICINE

## 2023-07-24 PROCEDURE — 3074F SYST BP LT 130 MM HG: CPT | Mod: CPTII,S$GLB,, | Performed by: FAMILY MEDICINE

## 2023-07-24 PROCEDURE — 99999 PR PBB SHADOW E&M-EST. PATIENT-LVL III: CPT | Mod: PBBFAC,,, | Performed by: FAMILY MEDICINE

## 2023-07-24 PROCEDURE — 99999 PR PBB SHADOW E&M-EST. PATIENT-LVL III: ICD-10-PCS | Mod: PBBFAC,,, | Performed by: FAMILY MEDICINE

## 2023-07-24 PROCEDURE — 99214 PR OFFICE/OUTPT VISIT, EST, LEVL IV, 30-39 MIN: ICD-10-PCS | Mod: S$GLB,,, | Performed by: FAMILY MEDICINE

## 2023-07-24 PROCEDURE — 1159F PR MEDICATION LIST DOCUMENTED IN MEDICAL RECORD: ICD-10-PCS | Mod: CPTII,S$GLB,, | Performed by: FAMILY MEDICINE

## 2023-07-24 PROCEDURE — 1126F PR PAIN SEVERITY QUANTIFIED, NO PAIN PRESENT: ICD-10-PCS | Mod: CPTII,S$GLB,, | Performed by: FAMILY MEDICINE

## 2023-07-24 PROCEDURE — 3066F PR DOCUMENTATION OF TREATMENT FOR NEPHROPATHY: ICD-10-PCS | Mod: CPTII,S$GLB,, | Performed by: FAMILY MEDICINE

## 2023-07-24 PROCEDURE — 3078F PR MOST RECENT DIASTOLIC BLOOD PRESSURE < 80 MM HG: ICD-10-PCS | Mod: CPTII,S$GLB,, | Performed by: FAMILY MEDICINE

## 2023-07-24 PROCEDURE — 3074F PR MOST RECENT SYSTOLIC BLOOD PRESSURE < 130 MM HG: ICD-10-PCS | Mod: CPTII,S$GLB,, | Performed by: FAMILY MEDICINE

## 2023-07-24 PROCEDURE — 1159F MED LIST DOCD IN RCRD: CPT | Mod: CPTII,S$GLB,, | Performed by: FAMILY MEDICINE

## 2023-07-24 NOTE — PROGRESS NOTES
Subjective:       Patient ID: Eulalia Villarreal is a 73 y.o. female.    Chief Complaint: Diabetes (6 month follow up)    Patient presents with:  Diabetes: 6 month follow up      DM2 - taking metformin 500mg BID  HLD - taking Crestor 10mg daily with CoQ 10  Daily HA - taking tylenol PRN; using voltaren topical at bedtime  Sleep apnea - wearing CPAP nightly  Heand arthritis - some chronic thumb pain with activity  saw podiatry and was diagnosed with neuropathy. Taking B12, D and ALA          Diabetes  Hypoglycemia symptoms include headaches (posterior neck with radiation into the scalp.). Pertinent negatives for hypoglycemia include no dizziness. Pertinent negatives for diabetes include no chest pain and no weakness.   Follow-up  Associated symptoms include arthralgias (hands), headaches (posterior neck with radiation into the scalp.) and numbness. Pertinent negatives include no abdominal pain, chest pain, chills, coughing, fever, nausea, neck pain, rash, sore throat, vomiting or weakness.   Headache   Associated symptoms include numbness. Pertinent negatives include no abdominal pain, coughing, dizziness, eye pain, fever, nausea, neck pain, sore throat, vomiting or weakness.   Hip Pain   Associated symptoms include numbness.     Past Medical History:   Diagnosis Date    Arthritis     hands    Diabetes mellitus     Herniated disc     L5    Hyperlipidemia        Past Surgical History:   Procedure Laterality Date    ADENOIDECTOMY      BACK SURGERY  1982    discectomy    FLEXIBLE SIGMOIDOSCOPY  8/7/1990  [facility]    TONSILLECTOMY      WISDOM TOOTH EXTRACTION         Review of patient's allergies indicates:   Allergen Reactions    Statins-hmg-coa reductase inhibitors Other (See Comments)     Severe pain to her left side    Meperidine      Other reaction(s): hyper    Sulfa (sulfonamide antibiotics) Rash     Other reaction(s): Itching       Social History     Socioeconomic History    Marital status:    Occupational  History    Occupation: retired    Tobacco Use    Smoking status: Never    Smokeless tobacco: Never   Substance and Sexual Activity    Alcohol use: No     Comment: rare    Drug use: No    Sexual activity: Never       Current Outpatient Medications on File Prior to Visit   Medication Sig Dispense Refill    alpha lipoic acid 200 mg Cap Take 200 mg by mouth once daily.      ascorbic acid, vitamin C, (VITAMIN C) 500 MG tablet Take 1 tablet by mouth Daily.      diclofenac sodium (VOLTAREN) 1 % Gel Apply 2 g topically 4 (four) times daily. To hands as needed 100 g 1    famotidine (PEPCID) 10 MG tablet Take 10 mg by mouth 2 (two) times daily.      fluocinonide 0.05% (LIDEX) 0.05 % cream Apply topically 2 (two) times daily. 60 g 0    glucosamine-D3-Boswellia serr 1,500-400-100 mg-unit-mg Tab Take by mouth.      ketoconazole (NIZORAL) 2 % shampoo Apply topically twice a week. 120 mL 4    metFORMIN (GLUCOPHAGE) 500 MG tablet Take 1 tablet (500 mg total) by mouth 2 (two) times daily with meals. 180 tablet 3    rosuvastatin (CRESTOR) 10 MG tablet Take 1 tablet (10 mg total) by mouth once daily. 90 tablet 3    tiZANidine (ZANAFLEX) 2 MG tablet TAKE 2 TABLETS (4 MG TOTAL) BY MOUTH NIGHTLY AS NEEDED (HEADACHE). 30 tablet 1    UBIDECARENONE (CO Q-10 ORAL) Take by mouth.      vitamin D 185 MG Tab Take 2,000 mg by mouth once daily.      VITAMIN E ACETATE (VITAMIN E ORAL) Take 1 capsule by mouth Daily.      FLUAD QUAD 2021-22,65Y UP,,PF, 60 mcg (15 mcg x 4)/0.5 mL Syrg       FLUAD QUAD 2022-23,65Y UP,,PF, 60 mcg (15 mcg x 4)/0.5 mL Syrg       fluocinonide (LIDEX) 0.05 % external solution Apply topically 2 (two) times daily. (Patient not taking: Reported on 7/24/2023) 60 mL 2     No current facility-administered medications on file prior to visit.       Family History   Problem Relation Age of Onset    Diabetes Mother     Coronary artery disease Mother     Diabetes Son     Cancer Maternal Aunt     Cerebral aneurysm Father   "   Coronary artery disease Sister     Coronary artery disease Brother     Heart disease Neg Hx     Glaucoma Neg Hx     Retinal detachment Neg Hx     Macular degeneration Neg Hx     Melanoma Neg Hx        Review of Systems   Constitutional:  Negative for appetite change, chills, fever and unexpected weight change.   HENT:  Negative for sore throat and trouble swallowing.    Eyes:  Negative for pain and visual disturbance.   Respiratory:  Negative for cough, shortness of breath and wheezing.    Cardiovascular:  Negative for chest pain and palpitations.   Gastrointestinal:  Negative for abdominal pain, blood in stool, diarrhea, nausea and vomiting.   Genitourinary:  Negative for difficulty urinating, dysuria and hematuria.   Musculoskeletal:  Positive for arthralgias (hands). Negative for gait problem and neck pain.   Skin:  Negative for rash and wound.   Neurological:  Positive for numbness and headaches (posterior neck with radiation into the scalp.). Negative for dizziness and weakness.   Hematological:  Negative for adenopathy.   Psychiatric/Behavioral:  Negative for dysphoric mood.      Objective:      /74   Pulse 78   Temp 97.5 °F (36.4 °C) (Oral)   Resp 18   Ht 5' 4" (1.626 m)   Wt 67.4 kg (148 lb 9.4 oz)   SpO2 97%   BMI 25.51 kg/m²   Physical Exam  Constitutional:       Appearance: Normal appearance. She is well-developed.   HENT:      Head: Normocephalic.      Mouth/Throat:      Pharynx: No oropharyngeal exudate or posterior oropharyngeal erythema.   Eyes:      Conjunctiva/sclera: Conjunctivae normal.      Pupils: Pupils are equal, round, and reactive to light.   Neck:      Thyroid: No thyromegaly.   Cardiovascular:      Rate and Rhythm: Normal rate and regular rhythm.      Heart sounds: Normal heart sounds, S1 normal and S2 normal. No murmur heard.    No friction rub. No gallop.   Pulmonary:      Effort: Pulmonary effort is normal.      Breath sounds: Normal breath sounds. No wheezing or rales. "   Abdominal:      General: Bowel sounds are normal.      Palpations: Abdomen is soft.   Musculoskeletal:      Cervical back: Normal range of motion and neck supple.      Lumbar back: Normal.      Left hip: Normal.      Right lower leg: No edema.      Left lower leg: No edema.   Lymphadenopathy:      Cervical: No cervical adenopathy.   Skin:     General: Skin is warm.      Findings: No rash.   Neurological:      Mental Status: She is alert and oriented to person, place, and time.      Cranial Nerves: No cranial nerve deficit.      Gait: Gait normal.     Foot exam: inspection normal, sensation intact; 2+ DP pulses bilaterally    Results for orders placed or performed in visit on 07/17/23   Hemoglobin A1C   Result Value Ref Range    Hemoglobin A1C 6.7 (H) 4.0 - 5.6 %    Estimated Avg Glucose 146 (H) 68 - 131 mg/dL   Comprehensive Metabolic Panel   Result Value Ref Range    Sodium 141 136 - 145 mmol/L    Potassium 5.3 (H) 3.5 - 5.1 mmol/L    Chloride 105 95 - 110 mmol/L    CO2 27 23 - 29 mmol/L    Glucose 148 (H) 70 - 110 mg/dL    BUN 15 8 - 23 mg/dL    Creatinine 0.8 0.5 - 1.4 mg/dL    Calcium 9.4 8.7 - 10.5 mg/dL    Total Protein 7.1 6.0 - 8.4 g/dL    Albumin 3.9 3.5 - 5.2 g/dL    Total Bilirubin 1.2 (H) 0.1 - 1.0 mg/dL    Alkaline Phosphatase 83 55 - 135 U/L    AST 21 10 - 40 U/L    ALT 20 10 - 44 U/L    eGFR >60.0 >60 mL/min/1.73 m^2    Anion Gap 9 8 - 16 mmol/L     Labs 1/12/23 reviewed    Assessment:       1. Controlled type 2 diabetes mellitus without complication, without long-term current use of insulin    2. Hyperlipidemia LDL goal <100    3. Gastroesophageal reflux disease, unspecified whether esophagitis present    4. Breast cancer screening by mammogram            Plan:       Controlled type 2 diabetes mellitus without complication, without long-term current use of insulin  -     Hemoglobin A1C; Future; Expected date: 01/20/2024  -     Comprehensive Metabolic Panel; Future; Expected date: 01/20/2024  -      Lipid Panel; Future; Expected date: 01/20/2024  -     Microalbumin/Creatinine Ratio, Urine; Future; Expected date: 01/20/2024    Hyperlipidemia LDL goal <100    Gastroesophageal reflux disease, unspecified whether esophagitis present    Breast cancer screening by mammogram  -     Mammo Digital Screening [facility] w/ Severino; Future; Expected date: 08/09/2023            Pepcid 20mg daily  Adivsed her to join gym to assist with weight loss and improved diabetes control; declines additional medication, but Jardiance would be advised.  continue other present meds  F/u 6 months with labs  Counseled on regular exercise, maintenance of a healthy weight, balanced diet rich in fruits/vegetables and lean protein, and avoidance of unhealthy habits like smoking and excessive alcohol intake.

## 2023-08-01 ENCOUNTER — PATIENT OUTREACH (OUTPATIENT)
Dept: ADMINISTRATIVE | Facility: HOSPITAL | Age: 73
End: 2023-08-01
Payer: MEDICARE

## 2023-08-10 ENCOUNTER — HOSPITAL ENCOUNTER (OUTPATIENT)
Dept: RADIOLOGY | Facility: HOSPITAL | Age: 73
Discharge: HOME | End: 2023-08-10
Attending: FAMILY MEDICINE
Payer: MEDICARE

## 2023-08-10 DIAGNOSIS — Z12.31 BREAST CANCER SCREENING BY MAMMOGRAM: ICD-10-CM

## 2023-08-10 PROCEDURE — 77067 SCR MAMMO BI INCL CAD: CPT | Mod: 26,,, | Performed by: RADIOLOGY

## 2023-08-10 PROCEDURE — 77063 MAMMO DIGITAL SCREENING BILAT WITH TOMO: ICD-10-PCS | Mod: 26,,, | Performed by: RADIOLOGY

## 2023-08-10 PROCEDURE — 77067 MAMMO DIGITAL SCREENING BILAT WITH TOMO: ICD-10-PCS | Mod: 26,,, | Performed by: RADIOLOGY

## 2023-08-10 PROCEDURE — 77063 BREAST TOMOSYNTHESIS BI: CPT | Mod: 26,,, | Performed by: RADIOLOGY

## 2023-08-10 PROCEDURE — 77067 SCR MAMMO BI INCL CAD: CPT | Mod: TC,PO

## 2023-09-12 ENCOUNTER — TELEPHONE (OUTPATIENT)
Dept: FAMILY MEDICINE | Facility: CLINIC | Age: 73
End: 2023-09-12
Payer: MEDICARE

## 2023-09-12 DIAGNOSIS — M25.552 LEFT HIP PAIN: Primary | ICD-10-CM

## 2023-09-12 DIAGNOSIS — M79.642 LEFT HAND PAIN: ICD-10-CM

## 2023-09-12 NOTE — TELEPHONE ENCOUNTER
Called pt and spoke to her.    Having issues on her left side. Left hand and left hip    Requesting a referral to placed.

## 2023-09-12 NOTE — TELEPHONE ENCOUNTER
----- Message from Kristie Ochoa sent at 9/11/2023  3:47 PM CDT -----  Contact: Pt  Type:  Needs Medical Advice    Who Called: Pt  Symptoms (please be specific): Hard to grasp with Wrist and Thumb on left hand      Would the patient rather a call back or a response via [facility]? call  Best Call Back Number: [phone]  Additional Information: Pt would like advise in regards to her symptoms. She would like to see a specialist. Please call pt back to advise.

## 2023-09-18 ENCOUNTER — OFFICE VISIT (OUTPATIENT)
Dept: ORTHOPEDICS | Facility: CLINIC | Age: 73
End: 2023-09-18
Payer: MEDICARE

## 2023-09-18 ENCOUNTER — HOSPITAL ENCOUNTER (OUTPATIENT)
Dept: RADIOLOGY | Facility: HOSPITAL | Age: 73
Discharge: HOME | End: 2023-09-18
Attending: PHYSICIAN ASSISTANT
Payer: MEDICARE

## 2023-09-18 DIAGNOSIS — M18.12 PRIMARY OSTEOARTHRITIS OF FIRST CARPOMETACARPAL JOINT OF LEFT HAND: Primary | ICD-10-CM

## 2023-09-18 DIAGNOSIS — M18.12 PRIMARY OSTEOARTHRITIS OF FIRST CARPOMETACARPAL JOINT OF LEFT HAND: ICD-10-CM

## 2023-09-18 PROCEDURE — 99999 PR PBB SHADOW E&M-EST. PATIENT-LVL III: ICD-10-PCS | Mod: PBBFAC,,, | Performed by: PHYSICIAN ASSISTANT

## 2023-09-18 PROCEDURE — 3061F NEG MICROALBUMINURIA REV: CPT | Mod: CPTII,S$GLB,, | Performed by: PHYSICIAN ASSISTANT

## 2023-09-18 PROCEDURE — 99999 PR PBB SHADOW E&M-EST. PATIENT-LVL III: CPT | Mod: PBBFAC,,, | Performed by: PHYSICIAN ASSISTANT

## 2023-09-18 PROCEDURE — 73130 XR HAND COMPLETE 3 VIEW LEFT: ICD-10-PCS | Mod: 26,LT,, | Performed by: RADIOLOGY

## 2023-09-18 PROCEDURE — 99203 OFFICE O/P NEW LOW 30 MIN: CPT | Mod: S$GLB,,, | Performed by: PHYSICIAN ASSISTANT

## 2023-09-18 PROCEDURE — 1159F MED LIST DOCD IN RCRD: CPT | Mod: CPTII,S$GLB,, | Performed by: PHYSICIAN ASSISTANT

## 2023-09-18 PROCEDURE — 3288F PR FALLS RISK ASSESSMENT DOCUMENTED: ICD-10-PCS | Mod: CPTII,S$GLB,, | Performed by: PHYSICIAN ASSISTANT

## 2023-09-18 PROCEDURE — 1125F PR PAIN SEVERITY QUANTIFIED, PAIN PRESENT: ICD-10-PCS | Mod: CPTII,S$GLB,, | Performed by: PHYSICIAN ASSISTANT

## 2023-09-18 PROCEDURE — 3044F HG A1C LEVEL LT 7.0%: CPT | Mod: CPTII,S$GLB,, | Performed by: PHYSICIAN ASSISTANT

## 2023-09-18 PROCEDURE — 73130 X-RAY EXAM OF HAND: CPT | Mod: 26,LT,, | Performed by: RADIOLOGY

## 2023-09-18 PROCEDURE — 3066F PR DOCUMENTATION OF TREATMENT FOR NEPHROPATHY: ICD-10-PCS | Mod: CPTII,S$GLB,, | Performed by: PHYSICIAN ASSISTANT

## 2023-09-18 PROCEDURE — 1160F RVW MEDS BY RX/DR IN RCRD: CPT | Mod: CPTII,S$GLB,, | Performed by: PHYSICIAN ASSISTANT

## 2023-09-18 PROCEDURE — 99203 PR OFFICE/OUTPT VISIT, NEW, LEVL III, 30-44 MIN: ICD-10-PCS | Mod: S$GLB,,, | Performed by: PHYSICIAN ASSISTANT

## 2023-09-18 PROCEDURE — 1101F PT FALLS ASSESS-DOCD LE1/YR: CPT | Mod: CPTII,S$GLB,, | Performed by: PHYSICIAN ASSISTANT

## 2023-09-18 PROCEDURE — 3288F FALL RISK ASSESSMENT DOCD: CPT | Mod: CPTII,S$GLB,, | Performed by: PHYSICIAN ASSISTANT

## 2023-09-18 PROCEDURE — 1160F PR REVIEW ALL MEDS BY PRESCRIBER/CLIN PHARMACIST DOCUMENTED: ICD-10-PCS | Mod: CPTII,S$GLB,, | Performed by: PHYSICIAN ASSISTANT

## 2023-09-18 PROCEDURE — 1159F PR MEDICATION LIST DOCUMENTED IN MEDICAL RECORD: ICD-10-PCS | Mod: CPTII,S$GLB,, | Performed by: PHYSICIAN ASSISTANT

## 2023-09-18 PROCEDURE — 1101F PR PT FALLS ASSESS DOC 0-1 FALLS W/OUT INJ PAST YR: ICD-10-PCS | Mod: CPTII,S$GLB,, | Performed by: PHYSICIAN ASSISTANT

## 2023-09-18 PROCEDURE — 1125F AMNT PAIN NOTED PAIN PRSNT: CPT | Mod: CPTII,S$GLB,, | Performed by: PHYSICIAN ASSISTANT

## 2023-09-18 PROCEDURE — 3044F PR MOST RECENT HEMOGLOBIN A1C LEVEL <7.0%: ICD-10-PCS | Mod: CPTII,S$GLB,, | Performed by: PHYSICIAN ASSISTANT

## 2023-09-18 PROCEDURE — 73130 X-RAY EXAM OF HAND: CPT | Mod: TC,PO,LT

## 2023-09-18 PROCEDURE — 3061F PR NEG MICROALBUMINURIA RESULT DOCUMENTED/REVIEW: ICD-10-PCS | Mod: CPTII,S$GLB,, | Performed by: PHYSICIAN ASSISTANT

## 2023-09-18 PROCEDURE — 3066F NEPHROPATHY DOC TX: CPT | Mod: CPTII,S$GLB,, | Performed by: PHYSICIAN ASSISTANT

## 2023-09-18 RX ORDER — MELOXICAM 15 MG/1
15 TABLET ORAL DAILY
Qty: 28 TABLET | Refills: 0 | Status: SHIPPED | OUTPATIENT
Start: 2023-09-18 | End: 2024-01-24

## 2023-09-18 NOTE — PROGRESS NOTES
9/18/2023    Chief Complaint:  Chief Complaint   Patient presents with    Left Hand - Pain        HPI:  Eulalia Villarreal is a 73 y.o. female who presents to clinic today for evaluation of her left hand pain and dysfunction.  States pain on average is 2/10.  States pain is worse with activity.  States pain is better with rest.  States pain is located at the base of the thumb.  States symptoms have been present for the past 4-5 years.  States have been gradually worsening in that time.  Denies any acute injury she can recall.  Denies any paresthesias.  Denies any other complaints at this time.    PMHX:  Past Medical History:   Diagnosis Date    Arthritis     hands    Diabetes mellitus     Herniated disc     L5    Hyperlipidemia        PSHX:  Past Surgical History:   Procedure Laterality Date    ADENOIDECTOMY      BACK SURGERY  1982    discectomy    FLEXIBLE SIGMOIDOSCOPY  8/7/1990  [facility]    TONSILLECTOMY      WISDOM TOOTH EXTRACTION         FMHX:  Family History   Problem Relation Age of Onset    Diabetes Mother     Coronary artery disease Mother     Diabetes Son     Cancer Maternal Aunt     Cerebral aneurysm Father     Coronary artery disease Sister     Coronary artery disease Brother     Heart disease Neg Hx     Glaucoma Neg Hx     Retinal detachment Neg Hx     Macular degeneration Neg Hx     Melanoma Neg Hx        SOCHX:  Social History     Tobacco Use    Smoking status: Never    Smokeless tobacco: Never   Substance Use Topics    Alcohol use: No     Comment: rare       ALLERGIES:  Meperidine and Sulfa (sulfonamide antibiotics)    CURRENT MEDICATIONS:  Current Outpatient Medications on File Prior to Visit   Medication Sig Dispense Refill    alpha lipoic acid 200 mg Cap Take 200 mg by mouth once daily.      ascorbic acid, vitamin C, (VITAMIN C) 500 MG tablet Take 1 tablet by mouth Daily.      diclofenac sodium (VOLTAREN) 1 % Gel Apply 2 g topically 4 (four) times daily. To hands as needed 100 g 1    famotidine  (PEPCID) 10 MG tablet Take 10 mg by mouth 2 (two) times daily.      FLUAD QUAD 2021-22,65Y UP,,PF, 60 mcg (15 mcg x 4)/0.5 mL Syrg       FLUAD QUAD 2022-23,65Y UP,,PF, 60 mcg (15 mcg x 4)/0.5 mL Syrg       fluocinonide (LIDEX) 0.05 % external solution Apply topically 2 (two) times daily. 60 mL 2    fluocinonide 0.05% (LIDEX) 0.05 % cream Apply topically 2 (two) times daily. 60 g 0    glucosamine-D3-Boswellia serr 1,500-400-100 mg-unit-mg Tab Take by mouth.      ketoconazole (NIZORAL) 2 % shampoo Apply topically twice a week. 120 mL 4    metFORMIN (GLUCOPHAGE) 500 MG tablet Take 1 tablet (500 mg total) by mouth 2 (two) times daily with meals. 180 tablet 3    rosuvastatin (CRESTOR) 10 MG tablet Take 1 tablet (10 mg total) by mouth once daily. 90 tablet 3    tiZANidine (ZANAFLEX) 2 MG tablet TAKE 2 TABLETS (4 MG TOTAL) BY MOUTH NIGHTLY AS NEEDED (HEADACHE). 30 tablet 1    UBIDECARENONE (CO Q-10 ORAL) Take by mouth.      vitamin D 185 MG Tab Take 2,000 mg by mouth once daily.      VITAMIN E ACETATE (VITAMIN E ORAL) Take 1 capsule by mouth Daily.       No current facility-administered medications on file prior to visit.       REVIEW OF SYSTEMS:  ROS    Review of Systems Compete; Negative, unless noted above.    GENERAL PHYSICAL EXAM:   There were no vitals taken for this visit.   GEN: well developed, well nourished, no acute distress   HENT: Normocephalic, atraumatic   EYES: No discharge, conjunctiva normal   PULM: No wheezing, no respiratory distress   CV: Regular rate and rhythm     ORTHO EXAM:   Examination of the left hand reveals mild edema of the 1st CMC joint.  No erythema, ecchymosis, or skin breakdown.  Able make composite fist and fully extend all fingers.  Full intact range of motion of the left wrist.  Tenderness palpation of the 1st CMC joint.  Positive grind test of the 1st CMC joint.  No tenderness noted the remainder of the left hand/wrist.  5/5 /intrinsic strength.  Normal sensation in the radial,  ulnar, median nerve distributions.  Capillary refill less than 2 seconds in all fingers.    RADIOLOGY:   X-rays of the left hand were taken today in clinic.  X-rays reviewed by myself.  Imaging showed no acute fracture or dislocation.  No subluxation.  No radiopaque foreign body or mass noted.  No osseous destructive/erosive processes noted.  Presence of severe degenerative changes of the 1st CMC joint of the left hand.  Presence of scattered degenerative changes throughout the IP joints of the left hand.  No other significant bony abnormalities noted.    ASSESSMENT:   First CMC joint osteoarthritis of the left hand    PLAN:  1. I discussed with Eulalia Villarreal the CMC joint osteoarthritis pathology and treatment options in detail during today's visit.  After treatment options were discussed, decided the best course of action this time is to proceed with splinting and a course of oral anti-inflammatories via Mobic. We discussed considering that they have no kidney dysfunction, not currently taking blood thinners, no uncontrolled GERD/peptic ulcer disease, no longstanding history of cardiac disease, and no adverse effects to NSAIDs that they are a candidate for oral prescription NSAID therapy.  She verbally agreed with the treatment plan.      2. She was referred to occupational therapy to be fitted for a meta  splint.      3.  She was prescribed Mobic 15 mg to be taken once daily.  She was instructed to discontinue the medication for any adverse effects.  She was instructed to not take any other type of NSAIDs while taking this medication.  She verbalized understanding.      4. I would like to have her follow up in clinic in 8 weeks for repeat evaluation.  She was instructed to contact clinic for any problems or concerns in the interim.

## 2023-09-19 ENCOUNTER — CLINICAL SUPPORT (OUTPATIENT)
Dept: REHABILITATION | Facility: HOSPITAL | Age: 73
End: 2023-09-19
Payer: MEDICARE

## 2023-09-19 DIAGNOSIS — M79.645 PAIN OF LEFT THUMB: Primary | ICD-10-CM

## 2023-09-19 DIAGNOSIS — M18.12 PRIMARY OSTEOARTHRITIS OF FIRST CARPOMETACARPAL JOINT OF LEFT HAND: ICD-10-CM

## 2023-09-19 DIAGNOSIS — M25.632 DECREASED RANGE OF MOTION OF LEFT WRIST: ICD-10-CM

## 2023-09-19 DIAGNOSIS — Z78.9 DECREASED ACTIVITIES OF DAILY LIVING (ADL): ICD-10-CM

## 2023-09-19 PROCEDURE — 97535 SELF CARE MNGMENT TRAINING: CPT | Mod: PO

## 2023-09-19 PROCEDURE — 97760 ORTHOTIC MGMT&TRAING 1ST ENC: CPT | Mod: PO

## 2023-09-19 PROCEDURE — 97165 OT EVAL LOW COMPLEX 30 MIN: CPT | Mod: PO

## 2023-09-19 NOTE — PLAN OF CARE
[facility] Occupational Therapy  Initial Evaluation     Date: 9/19/2023  Patient: Eulalia Villarreal  Chart Number: [id number]    Therapy Diagnosis: L thumb pain, decreased ROM  L thumb/wrist,  decreased ADL  Physician: Trent Mathias, KRAIG    Physician Orders: Patient has a history of 1st CMC joint osteoarthritis of the left hand/thumb.  Patient requires occupational therapy to fit her for a meta  splint and instruct her on activity modification/avoidance.       Duration:  3 visits  Frequency:  P.r.n.     Please fit the patient for a meta  splint of the left hand/wrist and instruct her on activity modification/avoidance.  Thanks!   Medical Diagnosis: M18.12 (ICD-10-CM) - Primary osteoarthritis of first carpometacarpal joint of left hand    Evaluation Date: 9/19/2023  Insurance Authorization Period Expiration: 11/14/2024  Plan of Care Certification Period: 11/3/23  Date of Return to MD: 10/16/23  Visit # / Visits authorized: 1 / 1  FOTO: Not performed.     Time In:1433  Time Out: 1518  Total Appointment Time (timed & untimed codes): 45 minutes      Surgical Procedure:   N/A     Precautions: Standard and Diabetes    Date of Onset: 8/2023    S/P: chronic      Subjective     Involved Side: Left  Dominant Side:  Left     Mechanism of Injury/ History of Current Condition: Onset of pain at base of thumb approx 4-5 years ago but has gradually worsened over time, especially over the last month or so. She has only tried Tylenol per her PCP in the past but pain has become severe. She sought specialist opinion, who diagnosed her with thumb CMC arthritis and prescribed Mobic and sent her to therapy. Pt also report symptoms are beginning int he R hand. She is having trouble gripping on L hand, limiting ability to open jars and carry objects.     Imaging: X rays: Osseous structures demonstrate diffuse demineralization.  No radiographically evident acute fracture, dislocation, or osseous destructive  process.  Advanced degenerative change at the thumb carpometacarpal joint with severe joint space loss subchondral sclerosis and periarticular osteophyte formation.  Scattered interphalangeal joint degenerative change.    Previous Therapy: none    Patient's Goals for Therapy: no pain and to regain grasp/use of thumb     Pain:  Functional Pain Scale Rating 0-10:   0/10 now  0/10 at best  8-9/10 at worst  Location: L base of thumb CMC and radiates proximally   Description: deep hurt  Aggravating Factors: functional use   Easing Factors: rest    Occupation:  Retired       Functional Limitations/Social History:    Previous functional status includes: Independent with all ADLs.     Current FunctionalStatus   Home/Living environment : lives with daughter      Limitation of Functional Status as follows:   ADLs/IADLs: Difficulty with writing, estephanie in script, opening jars, wide grasp,  laundry (using R), meal prep,  grocery shopping, household chores.     - Feeding: Difficulty cutting food    - Bathing: avoids using thumb     - Dressing/Grooming: using R hand if she has trouble with fasteners or pulling     - Driving: I     Leisure: Gardening      Past Medical History/Physical Systems Review:   Eulalia Villarreal  has a past medical history of Arthritis, Diabetes mellitus, Herniated disc, and Hyperlipidemia.    Eulalia Villarreal  has a past surgical history that includes Back surgery (1982); Tonsillectomy; Adenoidectomy; Flexible sigmoidoscopy (8/7/1990  [facility]); and [place] tooth extraction.    Eulalia has a current medication list which includes the following prescription(s): alpha lipoic acid, ascorbic acid (vitamin c), diclofenac sodium, famotidine, fluad quad 2021-22(65y up)(pf), fluad quad 2022-23(65y up)(pf), fluocinonide, fluocinonide 0.05%, glucosamine-d3-boswellia serr, ketoconazole, meloxicam, metformin, rosuvastatin, tizanidine, ubidecarenone, vitamin d, and vitamin e acetate.    Review of patient's allergies indicates:    Allergen Reactions    Meperidine      Other reaction(s): hyper    Sulfa (sulfonamide antibiotics) Rash     Other reaction(s): Itching          Objective     Observation/Inspection:   Radial prominence and slight dorsal prominence palpable.  Mild hyperextension laxity noted at L thumb MPJ. Mild loss of web space  present.     Sensation: Pt reports occasional paresthesias in fingers during the night and feels she flexes wrists.. Intact to light touch.         Edema:            (in cm) L R   Proximal Wrist Crease     MPs 18.9 19.3   Thumb Proximal Phalanx 6.1 6.0              Range of Motion:            Left/ Right    Wrist E/F  69/60 74/80   Thumb R/P Abd 45/43 51/40   Thumb MP flex 0+/49 0/38   Thumb IP flex 0/60 0/54   Thumb Opp 0.25 cm 0 cm                             Manual Muscle Test:  APB 5/5                                       and Pinch Strength: not tested     Special Tests: none performed         CMS Impairment/Limitation/Restriction for FOTO  Survey    Pt has a chronic condition and is referred for 3 visits only for orthotic fitting and training and education to manage symptoms. FOTO not performed. See Quick DASH below.          The Quick DASH Questionnaire- The following scores are based on patient reported assessment at the time of the initial occupational therapy evaluation:    Activity:  1. Open a tight jar: severe Difficulty  2. Do heavy household chores: mild Difficulty  3. Carry a shopping bag or briefcase: no Difficulty  4. Wash your back: no Difficulty  5.Use a knife to cut food:no Difficulty  6.. Recreational activities requiring force through arm: moderate Difficulty    7. Social Limitation: moderately Limited  8. Work/ADL Limitation: moderately  Limited    Severity of Symptoms (over the past week)  9. Pain: moderate  10. Tingling: mild    11. Sleeping Limitation: mild Difficulty    Score: 31.8%        Treatment     Total Treatment time separate from Evaluation time: 25 min       SUKI  received orthotic fitting and training (16529)  to improve functional performance for 15  minutes, including:  -Pt was measured and fit for a Push Metagrip, L size 2. Pt reported reduced pain for opening jar, writing, and lifting wide object with orthosis donned. Pt educated on wear, care, and precautions of orthosis. Pt to wear as much as possible for 2 weeks then PRN during the day and at night.        SUKI received the following Self Care Management (77663) for 10 minutes:   -Initiated education on diagnosis, anatomy, precautions/aggravating factors.   -Initiated discussion on joint protection. Issued joint protection handout for pt to read for discussion at next session.       Home Exercise Program/Education:  Issued HEP (see patient instructions in EMR) and educated on modality use for pain management . Exercises were reviewed and SUKI was able to demonstrate them prior to the end of the session.   Pt received a written copy of exercises to perform at home. SUKI demonstrated good  understanding of the education provided.  Pt was advised to perform these exercises free of pain, and to stop performing them if pain occurs.    Patient/Family Education: role of OT, goals for OT, scheduling/cancellations - pt verbalized understanding.     Assessment     Suki Villarreal is a 73 y.o. female referred to outpatient occupational/hand therapy and presents with a medical diagnosis of L thumb CMC arthritis, resulting in  pain and decreased functional use of L dominant UE and demonstrates limitations as described in the chart below. Pt would also benefit education on diagnosis, anatomy, aggravating factors/precautions, as well as joint protection, energy conservation, and adaptive equipment to reduce risk of further progression and preserve functional use.     The patient's rehab potential is Good.     Anticipated barriers to occupational therapy: none   Pt has no cultural, educational or language barriers to learning  provided.    Medical Necessity is demonstrated by the following  Occupational Profile/History  Co-morbidities and personal factors that may impact the plan of care [x] LOW: Brief chart review  [] MODERATE: Expanded chart review   [] HIGH: Extensive chart review    Moderate / High Support Documentation: reviewed referral, emergency encounter, MD appointments, imaging, op report     Examination  Performance deficits relating to physical, cognitive or psychosocial skills that result in activity limitations and/or participation restrictions  [] LOW: addressing 1-3 Performance deficits  [] MODERATE: 3-5 Performance deficits  [x] HIGH: 5+ Performance deficits (please support below)    Moderate / High Support Documentation:     Physical:  Joint Mobility  Joint Stability  Muscle Endurance   Strength  Pinch Strength  Pain    Cognitive:  No Deficits    Psychosocial:    No Deficits     Treatment Options [x] LOW: Limited options  [] MODERATE: Several options  [] HIGH: Multiple options      Decision Making/ Complexity Score: low         The following goals were discussed with the patient and patient is in agreement with them as to be addressed in the treatment plan.     Goals:     Short Term Goals:    1. Pt will understand proper pinch posture when performing fine motor tasks in clinic.   2. Pt will be aware of adaptive equipment available to improve functional use.   3. Pt will be educated on energy conservation and joint protection techniques.   4. Pt will be fit or measured for appropriate orthotics to maintain CMC abduction and provide support to increase functional use of L hand. (Met 9/19/23)   5. Pt will increase active wrist flexion to 70 degrees to enable ADLs.       Long Term Goals: (by discharge-3 visits)  1. Pt will be independent with orthotic wear and care to maximize functional use and provide support to painful joint.   2. Pt will be I with incorporation of joint protection techniques into ADLs.   3. Pt will  exhibit an improvement ( 5+ points) in the Quick DASH assessment indicative of improved functional use.   4. Pt will be independent with management of future flares/exacerbations L hand.     Plan     Certification Period/Plan of care expiration: 9/19/2023 to 11/3/23.    Outpatient Occupational Therapy 1  times weekly for 3 weeks to include the following interventions:     Modalities to decrease pain (moist heat, paraffin ), soft tissue mobilization, therapeutic activities, orthotic fitting/fabrication/training PRN, joint protections/energry conservation/adaptive equipment/activity modification  HEP/education as well as any other treatments deemed necessary based on the patient's needs or progress.    Updates Next Visit:      SALO Glez, MAET

## 2023-09-20 DIAGNOSIS — M25.552 LEFT HIP PAIN: Primary | ICD-10-CM

## 2023-09-21 ENCOUNTER — OFFICE VISIT (OUTPATIENT)
Dept: ORTHOPEDICS | Facility: CLINIC | Age: 73
End: 2023-09-21
Payer: MEDICARE

## 2023-09-21 ENCOUNTER — HOSPITAL ENCOUNTER (OUTPATIENT)
Dept: RADIOLOGY | Facility: HOSPITAL | Age: 73
Discharge: HOME | End: 2023-09-21
Attending: NURSE PRACTITIONER
Payer: MEDICARE

## 2023-09-21 VITALS — HEIGHT: 64 IN | BODY MASS INDEX: 25.36 KG/M2 | WEIGHT: 148.56 LBS

## 2023-09-21 DIAGNOSIS — M25.552 LEFT HIP PAIN: ICD-10-CM

## 2023-09-21 DIAGNOSIS — M79.642 LEFT HAND PAIN: ICD-10-CM

## 2023-09-21 DIAGNOSIS — M70.62 GREATER TROCHANTERIC BURSITIS OF LEFT HIP: Primary | ICD-10-CM

## 2023-09-21 PROCEDURE — 1159F PR MEDICATION LIST DOCUMENTED IN MEDICAL RECORD: ICD-10-PCS | Mod: CPTII,S$GLB,, | Performed by: NURSE PRACTITIONER

## 2023-09-21 PROCEDURE — 20610 DRAIN/INJ JOINT/BURSA W/O US: CPT | Mod: LT,S$GLB,, | Performed by: NURSE PRACTITIONER

## 2023-09-21 PROCEDURE — 1125F AMNT PAIN NOTED PAIN PRSNT: CPT | Mod: CPTII,S$GLB,, | Performed by: NURSE PRACTITIONER

## 2023-09-21 PROCEDURE — 73502 X-RAY EXAM HIP UNI 2-3 VIEWS: CPT | Mod: 26,LT,, | Performed by: RADIOLOGY

## 2023-09-21 PROCEDURE — 99213 PR OFFICE/OUTPT VISIT, EST, LEVL III, 20-29 MIN: ICD-10-PCS | Mod: 25,S$GLB,, | Performed by: NURSE PRACTITIONER

## 2023-09-21 PROCEDURE — 99999 PR PBB SHADOW E&M-EST. PATIENT-LVL IV: CPT | Mod: PBBFAC,,, | Performed by: NURSE PRACTITIONER

## 2023-09-21 PROCEDURE — 3061F PR NEG MICROALBUMINURIA RESULT DOCUMENTED/REVIEW: ICD-10-PCS | Mod: CPTII,S$GLB,, | Performed by: NURSE PRACTITIONER

## 2023-09-21 PROCEDURE — 3008F BODY MASS INDEX DOCD: CPT | Mod: CPTII,S$GLB,, | Performed by: NURSE PRACTITIONER

## 2023-09-21 PROCEDURE — 1125F PR PAIN SEVERITY QUANTIFIED, PAIN PRESENT: ICD-10-PCS | Mod: CPTII,S$GLB,, | Performed by: NURSE PRACTITIONER

## 2023-09-21 PROCEDURE — 73502 XR HIP WITH PELVIS WHEN PERFORMED, 2 OR 3 VIEWS LEFT: ICD-10-PCS | Mod: 26,LT,, | Performed by: RADIOLOGY

## 2023-09-21 PROCEDURE — 3044F PR MOST RECENT HEMOGLOBIN A1C LEVEL <7.0%: ICD-10-PCS | Mod: CPTII,S$GLB,, | Performed by: NURSE PRACTITIONER

## 2023-09-21 PROCEDURE — 3288F FALL RISK ASSESSMENT DOCD: CPT | Mod: CPTII,S$GLB,, | Performed by: NURSE PRACTITIONER

## 2023-09-21 PROCEDURE — 73502 X-RAY EXAM HIP UNI 2-3 VIEWS: CPT | Mod: TC,PO,LT

## 2023-09-21 PROCEDURE — 1101F PT FALLS ASSESS-DOCD LE1/YR: CPT | Mod: CPTII,S$GLB,, | Performed by: NURSE PRACTITIONER

## 2023-09-21 PROCEDURE — 3061F NEG MICROALBUMINURIA REV: CPT | Mod: CPTII,S$GLB,, | Performed by: NURSE PRACTITIONER

## 2023-09-21 PROCEDURE — 1160F RVW MEDS BY RX/DR IN RCRD: CPT | Mod: CPTII,S$GLB,, | Performed by: NURSE PRACTITIONER

## 2023-09-21 PROCEDURE — 3066F NEPHROPATHY DOC TX: CPT | Mod: CPTII,S$GLB,, | Performed by: NURSE PRACTITIONER

## 2023-09-21 PROCEDURE — 3044F HG A1C LEVEL LT 7.0%: CPT | Mod: CPTII,S$GLB,, | Performed by: NURSE PRACTITIONER

## 2023-09-21 PROCEDURE — 99999 PR PBB SHADOW E&M-EST. PATIENT-LVL IV: ICD-10-PCS | Mod: PBBFAC,,, | Performed by: NURSE PRACTITIONER

## 2023-09-21 PROCEDURE — 3288F PR FALLS RISK ASSESSMENT DOCUMENTED: ICD-10-PCS | Mod: CPTII,S$GLB,, | Performed by: NURSE PRACTITIONER

## 2023-09-21 PROCEDURE — 3066F PR DOCUMENTATION OF TREATMENT FOR NEPHROPATHY: ICD-10-PCS | Mod: CPTII,S$GLB,, | Performed by: NURSE PRACTITIONER

## 2023-09-21 PROCEDURE — 1159F MED LIST DOCD IN RCRD: CPT | Mod: CPTII,S$GLB,, | Performed by: NURSE PRACTITIONER

## 2023-09-21 PROCEDURE — 1101F PR PT FALLS ASSESS DOC 0-1 FALLS W/OUT INJ PAST YR: ICD-10-PCS | Mod: CPTII,S$GLB,, | Performed by: NURSE PRACTITIONER

## 2023-09-21 PROCEDURE — 1160F PR REVIEW ALL MEDS BY PRESCRIBER/CLIN PHARMACIST DOCUMENTED: ICD-10-PCS | Mod: CPTII,S$GLB,, | Performed by: NURSE PRACTITIONER

## 2023-09-21 PROCEDURE — 3008F PR BODY MASS INDEX (BMI) DOCUMENTED: ICD-10-PCS | Mod: CPTII,S$GLB,, | Performed by: NURSE PRACTITIONER

## 2023-09-21 PROCEDURE — 20610 LARGE JOINT ASPIRATION/INJECTION: L GREATER TROCHANTERIC BURSA: ICD-10-PCS | Mod: LT,S$GLB,, | Performed by: NURSE PRACTITIONER

## 2023-09-21 PROCEDURE — 99213 OFFICE O/P EST LOW 20 MIN: CPT | Mod: 25,S$GLB,, | Performed by: NURSE PRACTITIONER

## 2023-09-21 RX ADMIN — TRIAMCINOLONE ACETONIDE 40 MG: 40 INJECTION, SUSPENSION INTRA-ARTICULAR; INTRAMUSCULAR at 08:09

## 2023-09-21 NOTE — PROGRESS NOTES
Chief Complaint   Patient presents with    Left Hip - Pain      HPI:   This is a 73 y.o. who presents to clinic today for left hip pain for the past few years on and off after no known trauma. Complains of pain while sleeping on side and when descending stairs. Pain is dull and deep. No numbness or tingling. When she came back from vacation and had done a lot of walking, she started having pain.       Past Medical History:   Diagnosis Date    Arthritis     hands    Diabetes mellitus     Herniated disc     L5    Hyperlipidemia      Past Surgical History:   Procedure Laterality Date    ADENOIDECTOMY      BACK SURGERY  1982    discectomy    FLEXIBLE SIGMOIDOSCOPY  8/7/1990  [facility]    TONSILLECTOMY      WISDOM TOOTH EXTRACTION       Current Outpatient Medications on File Prior to Visit   Medication Sig Dispense Refill    alpha lipoic acid 200 mg Cap Take 200 mg by mouth once daily.      ascorbic acid, vitamin C, (VITAMIN C) 500 MG tablet Take 1 tablet by mouth Daily.      diclofenac sodium (VOLTAREN) 1 % Gel Apply 2 g topically 4 (four) times daily. To hands as needed 100 g 1    famotidine (PEPCID) 10 MG tablet Take 10 mg by mouth 2 (two) times daily.      FLUAD QUAD 2021-22,65Y UP,,PF, 60 mcg (15 mcg x 4)/0.5 mL Syrg       FLUAD QUAD 2022-23,65Y UP,,PF, 60 mcg (15 mcg x 4)/0.5 mL Syrg       fluocinonide (LIDEX) 0.05 % external solution Apply topically 2 (two) times daily. 60 mL 2    fluocinonide 0.05% (LIDEX) 0.05 % cream Apply topically 2 (two) times daily. 60 g 0    glucosamine-D3-Boswellia serr 1,500-400-100 mg-unit-mg Tab Take by mouth.      ketoconazole (NIZORAL) 2 % shampoo Apply topically twice a week. 120 mL 4    meloxicam (MOBIC) 15 MG tablet Take 1 tablet (15 mg total) by mouth once daily. 28 tablet 0    metFORMIN (GLUCOPHAGE) 500 MG tablet Take 1 tablet (500 mg total) by mouth 2 (two) times daily with meals. 180 tablet 3    rosuvastatin (CRESTOR) 10 MG tablet Take 1 tablet (10 mg total) by mouth once  daily. 90 tablet 3    tiZANidine (ZANAFLEX) 2 MG tablet TAKE 2 TABLETS (4 MG TOTAL) BY MOUTH NIGHTLY AS NEEDED (HEADACHE). 30 tablet 1    UBIDECARENONE (CO Q-10 ORAL) Take by mouth.      vitamin D 185 MG Tab Take 2,000 mg by mouth once daily.      VITAMIN E ACETATE (VITAMIN E ORAL) Take 1 capsule by mouth Daily.       No current facility-administered medications on file prior to visit.     Review of patient's allergies indicates:   Allergen Reactions    Meperidine      Other reaction(s): hyper    Sulfa (sulfonamide antibiotics) Rash     Other reaction(s): Itching     Family History   Problem Relation Age of Onset    Diabetes Mother     Coronary artery disease Mother     Diabetes Son     Cancer Maternal Aunt     Cerebral aneurysm Father     Coronary artery disease Sister     Coronary artery disease Brother     Heart disease Neg Hx     Glaucoma Neg Hx     Retinal detachment Neg Hx     Macular degeneration Neg Hx     Melanoma Neg Hx      Social History     Socioeconomic History    Marital status:    Occupational History    Occupation: retired    Tobacco Use    Smoking status: Never    Smokeless tobacco: Never   Substance and Sexual Activity    Alcohol use: No     Comment: rare    Drug use: No    Sexual activity: Never     Social Determinants of Health     Financial Resource Strain: Low Risk  (1/11/2021)    Overall Financial Resource Strain (CARDIA)     Difficulty of Paying Living Expenses: Not very hard   Food Insecurity: No Food Insecurity (1/11/2021)    Hunger Vital Sign     Worried About Running Out of Food in the Last Year: Never true     Ran Out of Food in the Last Year: Never true   Transportation Needs: No Transportation Needs (1/11/2021)    PRAPARE - Transportation     Lack of Transportation (Medical): No     Lack of Transportation (Non-Medical): No   Physical Activity: Inactive (1/11/2021)    Exercise Vital Sign     Days of Exercise per Week: 0 days     Minutes of Exercise per Session: 0  min   Stress: Stress Concern Present (1/11/2021)    [employer] [place] [employer] - Occupational Stress Questionnaire     Feeling of Stress : To some extent   Social Connections: Unknown (1/11/2021)    Social Connection and Isolation Panel [NHANES]     Frequency of Communication with Friends and Family: Once a week     Frequency of Social Gatherings with Friends and Family: Once a week     Active Member of Clubs or Organizations: No     Attends Club or Organization Meetings: Never     Marital Status:        Review of Systems:  Constitutional:  Denies fever or chills   Eyes:  Denies change in visual acuity   HENT:  Denies nasal congestion or sore throat   Respiratory:  Denies cough or shortness of breath   Cardiovascular:  Denies chest pain or edema   GI:  Denies abdominal pain, nausea, vomiting, bloody stools or diarrhea   :  Denies dysuria   Integument:  Denies rash   Neurologic:  Denies headache, focal weakness or sensory changes   Endocrine:  Denies polyuria or polydipsia   Lymphatic:  Denies swollen glands   Psychiatric:  Denies depression or anxiety     Physical Exam:   Constitutional:  Well developed, well nourished, no acute distress, non-toxic appearance   Integument:  Well hydrated  Neurologic:  Alert & oriented x 3  Psychiatric:  Speech and behavior appropriate     Bilateral Hip Exam    Right Hip Exam   Right hip exam performed same as contralateral hip and is normal.     Left Hip Exam   Tenderness   The patient is experiencing tenderness in the greater trochanter.  Range of Motion   The patient has normal hip ROM.  Muscle Strength   Abduction: 4/5   Other   Erythema: absent  Sensation: normal  Pulse: present    X-rays were personally reviewed by me and findings discussed with the patient.  2 views of the left hip show no fractures or dislocations.          Greater trochanteric bursitis of left hip  -     Large Joint Aspiration/Injection: L greater trochanteric bursa  -      triamcinolone acetonide injection 40 mg    Left hip pain  -     Ambulatory referral/consult to Orthopedics    Left hand pain  -     Ambulatory referral/consult to Orthopedics         Using an aseptic technique, I injected 5 cc of lidocaine 1% without and 1 cc of kenalog 40mg into the left Hip. The patient tolerated this well.     Rtc as needed.

## 2023-09-25 NOTE — PROGRESS NOTES
Occupational Therapy Daily Treatment Note     Name: Eulalia Villarreal  Clinic Number: [id number]      Visit Date: 9/26/2023    Therapy Diagnosis: L thumb pain, decreased ROM  L thumb/wrist,  decreased ADL  Physician: Trent Mathias PA-C     Physician Orders: Patient has a history of 1st CMC joint osteoarthritis of the left hand/thumb.  Patient requires occupational therapy to fit her for a meta  splint and instruct her on activity modification/avoidance.       Duration:  3 visits  Frequency:  P.r.n.     Please fit the patient for a meta  splint of the left hand/wrist and instruct her on activity modification/avoidance.  Thanks!   Medical Diagnosis: M18.12 (ICD-10-CM) - Primary osteoarthritis of first carpometacarpal joint of left hand     Evaluation Date: 9/19/2023  Insurance Authorization Period Expiration: 11/14/2023  Plan of Care Certification Period: 11/3/23  Date of Return to MD: 10/16/23  Visit # / Visits authorized: 2 / 23  FOTO: Not performed.      Time In:1100  Time Out: 1155  Total Appointment Time (timed & untimed codes): 55 minutes        Surgical Procedure:   N/A      Precautions: Standard and Diabetes     Date of Onset: 8/2023                                   S/P: chronic      Subjective     Pt reports: she feels that her splint has helped to reduce tenderness but does feel that the orthosis is a little short on her hand.   she was compliant with home exercise program given last session.   Response to previous treatment:Good   Functional change: less pain with use of orthotic     Pain:   Functional Pain Scale Rating 0-10:   0/10 now  0/10 at best  8-9/10 at worst  Location: L base of thumb CMC and radiates proximally     Objective       Range of Motion:               Left/ Right    Wrist E/F  69/60 74/80   Thumb R/P Abd 45/43 51/40   Thumb MP flex 0+/49 0/38   Thumb IP flex 0/60 0/54   Thumb Opp 0.25 cm 0 cm             EULALIA received the following supervised modalities after being cleared for  contradictions for 15 minutes:   Patient received paraffin bath to L hand(s) for 15 minutes to increase blood flow, circulation, pain management and for tissue elasticity prior to therex. (Not in time calculation, performed during Self Care Management)      SUKI received the following manual therapy techniques for 7 minutes:   -STM to thenars and 1st web  -trigger point release 1st web space    SUKI received therapeutic exercises for 17 minutes including:  -Painfree AROM: thumb circles/reverse, IPJ/MPJ blocks, palmar abd, radial abd with hand in resting position, extension to neutral over table edge, finger abd/add, wrist E/F 5 reps each   -gentle thenar stretch avoiding retropositon     Home Exercises and Education Provided     Education provided:  (Self Care Management, 88169, 31 minutes)   -Issued and discussed Conservative Management of Arthritis Handout and incorporation into ADLs. Made recommendations on activity modification and adaptive equipment.  -Issued and discussed home paraffin unit instructions. Educated pt on benefits of moist heat and paraffin for short term pain relief.   -Issued Painful thumb handout. Discussed thenar stretch, trigger point release, and STM to both areas.   -Continued discussion of joint protection principles, including incorporating them into ADLs, and problem solving for future modifications. -Recommended pt read oint protection hand out several times to increase ability to incorporate modifications into ADLs.       Written Home Exercises Provided: Patient instructed to cont prior HEP.  Exercises were reviewed and SUKI was able to demonstrate them prior to the end of the session.  SUKI demonstrated good  understanding of the education provided.   .   See EMR under Patient Instructions for exercises provided prior visit.        Assessment     Pt would continue to benefit from skilled OT. Pt responding favorably to orthotic wear and is amenable to all education  provided.      - Progress towards goals: Good, STG 3 and LTGs 1 and 2 met     SUKI is progressing well towards her goals and there are no updates to goals at this time. Pt prognosis is Good.     Pt will continue to benefit from skilled outpatient occupational therapy to address the deficits listed in the problem list on initial evaluation provide pt/family education and to maximize pt's level of independence in the home and community environment.     Anticipated barriers to occupational therapy: none    Pt's spiritual, cultural and educational needs considered and pt agreeable to plan of care and goals.    Goals:  Short Term Goals:    1. Pt will understand proper pinch posture when performing fine motor tasks in clinic.   2. Pt will be aware of adaptive equipment available to improve functional use.   3. Pt will be educated on energy conservation and joint protection techniques. (Met 9/26/23)  4. Pt will be fit or measured for appropriate orthotics to maintain CMC abduction and provide support to increase functional use of L hand. (Met 9/19/23)   5. Pt will increase active wrist flexion to 70 degrees to enable ADLs.       Long Term Goals: (by discharge-3 visits)  1. Pt will be independent with orthotic wear and care to maximize functional use and provide support to painful joint. (Met 9/26/23)  2. Pt will be I with incorporation of joint protection techniques into ADLs. (Met 9/26/23)  3. Pt will exhibit an improvement ( 5+ points) in the Quick DASH assessment indicative of improved functional use.   4. Pt will be independent with management of future flares/exacerbations L hand.     Plan     Certification Period/Plan of care expiration: 9/19/2023 to 11/3/23.  Continue Occupational Therapy 1  times per week through certification period to decrease pain and edema, and increase A/PROM, strength, and functional use of L upper extremity.     Updates/Grading for next session:   -Educate pt on proper pinch posture and  to recognize improper functional pinches  -Reviewed [employer] Catalog with patient and provide web link. Recommend adaptive equipment to reduce pain and improve functional use of L hand.  -C pinch position 10 reps 5 sec hold  -C pinch 10 reps 5 sec hold  -Pom pom  with C pinch 1 container  -isometric 1st DI 10 reps       SALO Glez, CHT

## 2023-09-26 ENCOUNTER — CLINICAL SUPPORT (OUTPATIENT)
Dept: REHABILITATION | Facility: HOSPITAL | Age: 73
End: 2023-09-26
Payer: MEDICARE

## 2023-09-26 DIAGNOSIS — M25.632 DECREASED RANGE OF MOTION OF LEFT WRIST: ICD-10-CM

## 2023-09-26 DIAGNOSIS — Z78.9 DECREASED ACTIVITIES OF DAILY LIVING (ADL): ICD-10-CM

## 2023-09-26 DIAGNOSIS — M79.645 PAIN OF LEFT THUMB: Primary | ICD-10-CM

## 2023-09-26 PROCEDURE — 97535 SELF CARE MNGMENT TRAINING: CPT | Mod: PO

## 2023-09-26 PROCEDURE — 97110 THERAPEUTIC EXERCISES: CPT | Mod: PO

## 2023-09-26 RX ORDER — TRIAMCINOLONE ACETONIDE 40 MG/ML
40 INJECTION, SUSPENSION INTRA-ARTICULAR; INTRAMUSCULAR
Status: DISCONTINUED | OUTPATIENT
Start: 2023-09-21 | End: 2023-09-26 | Stop reason: HOSPADM

## 2023-09-27 NOTE — PROCEDURES
Large Joint Aspiration/Injection: L greater trochanteric bursa    Date/Time: 9/21/2023 8:20 AM    Performed by: Bertha Acosta FNP  Authorized by: Bertha Acosta FNP    Consent Done?:  Yes (Verbal)  Indications:  Pain  Timeout: prior to procedure the correct patient, procedure, and site was verified    Prep: patient was prepped and draped in usual sterile fashion    Local anesthetic:  Lidocaine 1% without epinephrine  Anesthetic total (ml):  5      Details:  Needle Size:  21 G  Approach:  Lateral  Location:  Hip  Site:  L greater trochanteric bursa  Medications:  40 mg triamcinolone acetonide 40 mg/mL  Patient tolerance:  Patient tolerated the procedure well with no immediate complications

## 2023-09-28 NOTE — PROGRESS NOTES
Occupational Therapy Daily Treatment Note / Discharge Summary     Name: Eulalia Villarreal  Clinic Number: [id number]      Visit Date: 10/3/2023    Therapy Diagnosis: L thumb pain, decreased ROM  L thumb/wrist,  decreased ADL  Physician: Trent Mathias PA-C     Physician Orders: Patient has a history of 1st CMC joint osteoarthritis of the left hand/thumb.  Patient requires occupational therapy to fit her for a meta  splint and instruct her on activity modification/avoidance.       Duration:  3 visits  Frequency:  P.r.n.     Please fit the patient for a meta  splint of the left hand/wrist and instruct her on activity modification/avoidance.  Thanks!   Medical Diagnosis: M18.12 (ICD-10-CM) - Primary osteoarthritis of first carpometacarpal joint of left hand     Evaluation Date: 9/19/2023  Insurance Authorization Period Expiration: 11/14/2023  Plan of Care Certification Period: 11/3/23  Date of Return to MD: 10/16/23  Visit # / Visits authorized: 3 / 23  FOTO: Not performed.      Time In:1345  Time Out: 1430  Total Appointment Time (timed & untimed codes): 45 minutes        Surgical Procedure:   N/A      Precautions: Standard and Diabetes     Date of Onset: 8/2023                                   S/P: chronic      Subjective     Pt reports: her hand has been cramping.    she was compliant with home exercise program given last session.   Response to previous treatment:Good   Functional change:      Pain:   Functional Pain Scale Rating 0-10:   0/10 now  0/10 at best  8-9/10 at worst  Location: L base of thumb CMC and radiates proximally     Objective     Pt has had 3 OT visits. Treatment has consisted of moist heat/paraffin, soft tissue mobilization, gentle AROM, there ex. Pt has also been fit with appropriate orthotics to provide support and proper positioning to thumb CMCJ. Extensive education has been provided for joint protection techniques, energy conservation, activity modification, and adaptive equipment.      Range of Motion:               Left/ Right    Wrist E/F  69/71 (+11) 74/80   Thumb R/P Abd 45/43 51/40   Thumb MP flex 0+/49 0/38   Thumb IP flex 0/60 0/54   Thumb Opp 0.25 cm 0 cm         The Quick DASH Questionnaire- The following scores are based on patient reported assessment at the time of the initial occupational therapy evaluation:    Activity:  1. Open a tight jar: moderate Difficulty  2. Do heavy household chores: no Difficulty  3. Carry a shopping bag or briefcase: no Difficulty  4. Wash your back: mild Difficulty  5.Use a knife to cut food:mild Difficulty  6.. Recreational activities requiring force through arm: mild Difficulty    7. Social Limitation: slightly Limited  8. Work/ADL Limitation: slightly Limited    Severity of Symptoms (over the past week)  9. Pain: mild  10. Tingling: none    11. Sleeping Limitation: no Difficulty    Score: 18%      SUKI received the following supervised modalities after being cleared for contradictions for 15 minutes:   Patient received paraffin bath to L hand(s) for 15 minutes to increase blood flow, circulation, pain management and for tissue elasticity prior to therex. (Not in time calculation, performed during Self Care Management)      SUKI received therapeutic exercises for 25 minutes including:  -Painfree AROM: thumb circles/reverse, IPJ/MPJ blocks, palmar abd, radial abd with hand in resting position, extension to neutral over table edge, finger abd/add, wrist E/F 5 reps each   --C pinch position 10 reps 5 sec hold  -C pinch 10 reps 5 sec hold  -Pom pom  with C pinch 1 container  -isometric 1st DI 10 reps   -reviewed gentle thenar stretch     Home Exercises and Education Provided     Education provided:  (Self Care Management, 73860, 20 minutes)   -Educated pt on proper pinch posture and to recognize improper functional pinches  -Reviewed [employer] Catalog with patient and provide web link. Recommended adaptive equipment to reduce pain  and improve functional use of L hand.      Written Home Exercises Provided: Patient instructed to cont prior HEP.  Exercises were reviewed and SUKI was able to demonstrate them prior to the end of the session.  SUKI demonstrated good  understanding of the education provided.   .   See EMR under Patient Instructions for exercises provided prior visit.        Assessment     Pt has responded favorably to therapy and has been amenable to all education provided. Quick DASH score has improved by 13 points.  All goals met. Pt to follow up with MD next week.       Goals:  Short Term Goals:    1. Pt will understand proper pinch posture when performing fine motor tasks in clinic. (Met 10/3/23)  2. Pt will be aware of adaptive equipment available to improve functional use. (Met 10/3/23)  3. Pt will be educated on energy conservation and joint protection techniques. (Met 9/26/23)  4. Pt will be fit or measured for appropriate orthotics to maintain CMC abduction and provide support to increase functional use of L hand. (Met 9/19/23)   5. Pt will increase active wrist flexion to 70 degrees to enable ADLs.  (Met 10/3/23)     Long Term Goals: (by discharge-3 visits)  1. Pt will be independent with orthotic wear and care to maximize functional use and provide support to painful joint. (Met 9/26/23)  2. Pt will be I with incorporation of joint protection techniques into ADLs. (Met 9/26/23)  3. Pt will exhibit an improvement ( 5+ points) in the Quick DASH assessment indicative of improved functional use. (Met 10/3/23)  4. Pt will be independent with management of future flares/exacerbations L hand. (Met 10/3/23)    Plan     Discharge OT services to [facility].    Updates/Grading for next session:       SALO Glze, DARIEL

## 2023-10-03 ENCOUNTER — CLINICAL SUPPORT (OUTPATIENT)
Dept: REHABILITATION | Facility: HOSPITAL | Age: 73
End: 2023-10-03
Payer: MEDICARE

## 2023-10-03 DIAGNOSIS — M79.645 PAIN OF LEFT THUMB: Primary | ICD-10-CM

## 2023-10-03 DIAGNOSIS — M25.632 DECREASED RANGE OF MOTION OF LEFT WRIST: ICD-10-CM

## 2023-10-03 DIAGNOSIS — Z78.9 DECREASED ACTIVITIES OF DAILY LIVING (ADL): ICD-10-CM

## 2023-10-03 PROCEDURE — 97110 THERAPEUTIC EXERCISES: CPT | Mod: PO

## 2023-10-03 PROCEDURE — 97018 PARAFFIN BATH THERAPY: CPT | Mod: PO

## 2023-10-03 PROCEDURE — 97535 SELF CARE MNGMENT TRAINING: CPT | Mod: PO

## 2023-10-16 ENCOUNTER — OFFICE VISIT (OUTPATIENT)
Dept: ORTHOPEDICS | Facility: CLINIC | Age: 73
End: 2023-10-16
Payer: MEDICARE

## 2023-10-16 DIAGNOSIS — M18.12 PRIMARY OSTEOARTHRITIS OF FIRST CARPOMETACARPAL JOINT OF LEFT HAND: Primary | ICD-10-CM

## 2023-10-16 PROCEDURE — 99999 PR PBB SHADOW E&M-EST. PATIENT-LVL III: CPT | Mod: PBBFAC,,, | Performed by: PHYSICIAN ASSISTANT

## 2023-10-16 PROCEDURE — 99213 PR OFFICE/OUTPT VISIT, EST, LEVL III, 20-29 MIN: ICD-10-PCS | Mod: S$GLB,,, | Performed by: PHYSICIAN ASSISTANT

## 2023-10-16 PROCEDURE — 3061F PR NEG MICROALBUMINURIA RESULT DOCUMENTED/REVIEW: ICD-10-PCS | Mod: CPTII,S$GLB,, | Performed by: PHYSICIAN ASSISTANT

## 2023-10-16 PROCEDURE — 99213 OFFICE O/P EST LOW 20 MIN: CPT | Mod: S$GLB,,, | Performed by: PHYSICIAN ASSISTANT

## 2023-10-16 PROCEDURE — 3066F NEPHROPATHY DOC TX: CPT | Mod: CPTII,S$GLB,, | Performed by: PHYSICIAN ASSISTANT

## 2023-10-16 PROCEDURE — 3044F HG A1C LEVEL LT 7.0%: CPT | Mod: CPTII,S$GLB,, | Performed by: PHYSICIAN ASSISTANT

## 2023-10-16 PROCEDURE — 1160F RVW MEDS BY RX/DR IN RCRD: CPT | Mod: CPTII,S$GLB,, | Performed by: PHYSICIAN ASSISTANT

## 2023-10-16 PROCEDURE — 99999 PR PBB SHADOW E&M-EST. PATIENT-LVL III: ICD-10-PCS | Mod: PBBFAC,,, | Performed by: PHYSICIAN ASSISTANT

## 2023-10-16 PROCEDURE — 1126F AMNT PAIN NOTED NONE PRSNT: CPT | Mod: CPTII,S$GLB,, | Performed by: PHYSICIAN ASSISTANT

## 2023-10-16 PROCEDURE — 3288F PR FALLS RISK ASSESSMENT DOCUMENTED: ICD-10-PCS | Mod: CPTII,S$GLB,, | Performed by: PHYSICIAN ASSISTANT

## 2023-10-16 PROCEDURE — 1159F PR MEDICATION LIST DOCUMENTED IN MEDICAL RECORD: ICD-10-PCS | Mod: CPTII,S$GLB,, | Performed by: PHYSICIAN ASSISTANT

## 2023-10-16 PROCEDURE — 1159F MED LIST DOCD IN RCRD: CPT | Mod: CPTII,S$GLB,, | Performed by: PHYSICIAN ASSISTANT

## 2023-10-16 PROCEDURE — 1101F PT FALLS ASSESS-DOCD LE1/YR: CPT | Mod: CPTII,S$GLB,, | Performed by: PHYSICIAN ASSISTANT

## 2023-10-16 PROCEDURE — 1160F PR REVIEW ALL MEDS BY PRESCRIBER/CLIN PHARMACIST DOCUMENTED: ICD-10-PCS | Mod: CPTII,S$GLB,, | Performed by: PHYSICIAN ASSISTANT

## 2023-10-16 PROCEDURE — 3044F PR MOST RECENT HEMOGLOBIN A1C LEVEL <7.0%: ICD-10-PCS | Mod: CPTII,S$GLB,, | Performed by: PHYSICIAN ASSISTANT

## 2023-10-16 PROCEDURE — 3061F NEG MICROALBUMINURIA REV: CPT | Mod: CPTII,S$GLB,, | Performed by: PHYSICIAN ASSISTANT

## 2023-10-16 PROCEDURE — 3066F PR DOCUMENTATION OF TREATMENT FOR NEPHROPATHY: ICD-10-PCS | Mod: CPTII,S$GLB,, | Performed by: PHYSICIAN ASSISTANT

## 2023-10-16 PROCEDURE — 1101F PR PT FALLS ASSESS DOC 0-1 FALLS W/OUT INJ PAST YR: ICD-10-PCS | Mod: CPTII,S$GLB,, | Performed by: PHYSICIAN ASSISTANT

## 2023-10-16 PROCEDURE — 3288F FALL RISK ASSESSMENT DOCD: CPT | Mod: CPTII,S$GLB,, | Performed by: PHYSICIAN ASSISTANT

## 2023-10-16 PROCEDURE — 1126F PR PAIN SEVERITY QUANTIFIED, NO PAIN PRESENT: ICD-10-PCS | Mod: CPTII,S$GLB,, | Performed by: PHYSICIAN ASSISTANT

## 2023-10-16 NOTE — PROGRESS NOTES
10/16/2023    HPI:  Eulalia Villarreal is a 73 y.o. female, who presents to clinic today for continued evaluation of her 1st CMC joint osteoarthritis of the left hand.  States her symptoms have significantly improved since last visit.  States her pain is currently minimal.  States she has worn the splint as instructed.  Denies any acute injuries since last visit.  Denies any other complaints at this time.    PMHX:  Past Medical History:   Diagnosis Date    Arthritis     hands    Diabetes mellitus     Herniated disc     L5    Hyperlipidemia        PSHX:  Past Surgical History:   Procedure Laterality Date    ADENOIDECTOMY      BACK SURGERY  1982    discectomy    FLEXIBLE SIGMOIDOSCOPY  8/7/1990  [facility]    TONSILLECTOMY      WISDOM TOOTH EXTRACTION         FMHX:  Family History   Problem Relation Age of Onset    Diabetes Mother     Coronary artery disease Mother     Diabetes Son     Cancer Maternal Aunt     Cerebral aneurysm Father     Coronary artery disease Sister     Coronary artery disease Brother     Heart disease Neg Hx     Glaucoma Neg Hx     Retinal detachment Neg Hx     Macular degeneration Neg Hx     Melanoma Neg Hx        SOCHX:  Social History     Tobacco Use    Smoking status: Never    Smokeless tobacco: Never   Substance Use Topics    Alcohol use: No     Comment: rare       ALLERGIES:  Meperidine and Sulfa (sulfonamide antibiotics)    CURRENT MEDICATIONS:  Current Outpatient Medications on File Prior to Visit   Medication Sig Dispense Refill    alpha lipoic acid 200 mg Cap Take 200 mg by mouth once daily.      ascorbic acid, vitamin C, (VITAMIN C) 500 MG tablet Take 1 tablet by mouth Daily.      diclofenac sodium (VOLTAREN) 1 % Gel Apply 2 g topically 4 (four) times daily. To hands as needed 100 g 1    famotidine (PEPCID) 10 MG tablet Take 10 mg by mouth 2 (two) times daily.      FLUAD QUAD 2021-22,65Y UP,,PF, 60 mcg (15 mcg x 4)/0.5 mL Syrg       FLUAD QUAD 2022-23,65Y UP,,PF, 60 mcg (15 mcg x 4)/0.5 mL  Syrg       fluocinonide (LIDEX) 0.05 % external solution Apply topically 2 (two) times daily. 60 mL 2    fluocinonide 0.05% (LIDEX) 0.05 % cream Apply topically 2 (two) times daily. 60 g 0    glucosamine-D3-Boswellia serr 1,500-400-100 mg-unit-mg Tab Take by mouth.      ketoconazole (NIZORAL) 2 % shampoo Apply topically twice a week. 120 mL 4    meloxicam (MOBIC) 15 MG tablet Take 1 tablet (15 mg total) by mouth once daily. 28 tablet 0    metFORMIN (GLUCOPHAGE) 500 MG tablet Take 1 tablet (500 mg total) by mouth 2 (two) times daily with meals. 180 tablet 3    rosuvastatin (CRESTOR) 10 MG tablet Take 1 tablet (10 mg total) by mouth once daily. 90 tablet 3    tiZANidine (ZANAFLEX) 2 MG tablet TAKE 2 TABLETS (4 MG TOTAL) BY MOUTH NIGHTLY AS NEEDED (HEADACHE). 30 tablet 1    UBIDECARENONE (CO Q-10 ORAL) Take by mouth.      vitamin D 185 MG Tab Take 2,000 mg by mouth once daily.      VITAMIN E ACETATE (VITAMIN E ORAL) Take 1 capsule by mouth Daily.       No current facility-administered medications on file prior to visit.       REVIEW OF SYSTEMS:  Review of Systems Complete; Negative, unless noted above.    GENERAL PHYSICAL EXAM:   There were no vitals taken for this visit.   GEN: well developed, well nourished, no acute distress   PULM: No wheezing, no respiratory distress   CV: RRR    ORTHO EXAM:   Examination of the left hand reveals no edema, erythema, ecchymosis, or skin breakdown.  Able make composite fist and fully extend all fingers.  Full intact range of motion of the left wrist.  Minimal tenderness to palpation of the 1st CMC joint.  5/5 /intrinsic strength.  Sensation is grossly intact in the radial, ulnar, median nerve distributions.  Capillary refill less than 2 seconds in all fingers.    RADIOLOGY:   None.    ASSESSMENT:   First CMC joint osteoarthritis of the left hand/thumb    PLAN:  1. I discussed with Eulalia Villarreal that she is progressed very well in the treatment course.  We discussed the best  course of action this time is to continue with her meta  splint as needed for activity.  She verbally agreed with the treatment plan.      2. I would like her follow up in clinic on a p.r.n. basis for any worsening of his symptoms or for any hand, wrist, or elbow problems/concerns.  She was instructed to contact clinic for any problems or concerns in the interim.

## 2023-10-25 ENCOUNTER — TELEPHONE (OUTPATIENT)
Dept: FAMILY MEDICINE | Facility: CLINIC | Age: 73
End: 2023-10-25
Payer: MEDICARE

## 2023-10-25 NOTE — TELEPHONE ENCOUNTER
Spoke with pt , she states she is feeling sick and is c/o bad headache , instructed pt to go to urgent care to be evaluted and treated by a

## 2023-10-25 NOTE — TELEPHONE ENCOUNTER
----- Message from Rosalinda Bernabe sent at 10/25/2023  8:36 AM CDT -----  Regarding: pt concern  Name of Who is Calling:SUKI PAULINO [[id number]]          What is the request in detail: pt states her head is hurting pretty bad and would like a same day appt           Can the clinic reply by [facility]: no           [facility] [phone] (home)

## 2024-01-17 ENCOUNTER — LAB VISIT (OUTPATIENT)
Dept: LAB | Facility: HOSPITAL | Age: 74
End: 2024-01-17
Attending: FAMILY MEDICINE
Payer: MEDICARE

## 2024-01-17 DIAGNOSIS — E11.9 CONTROLLED TYPE 2 DIABETES MELLITUS WITHOUT COMPLICATION, WITHOUT LONG-TERM CURRENT USE OF INSULIN: ICD-10-CM

## 2024-01-17 LAB
ALBUMIN SERPL BCP-MCNC: 4 G/DL (ref 3.5–5.2)
ALBUMIN/CREAT UR: 16 UG/MG (ref 0–30)
ALP SERPL-CCNC: 82 U/L (ref 55–135)
ALT SERPL W/O P-5'-P-CCNC: 18 U/L (ref 10–44)
ANION GAP SERPL CALC-SCNC: 7 MMOL/L (ref 8–16)
AST SERPL-CCNC: 19 U/L (ref 10–40)
BILIRUB SERPL-MCNC: 1.1 MG/DL (ref 0.1–1)
BUN SERPL-MCNC: 14 MG/DL (ref 8–23)
CALCIUM SERPL-MCNC: 9.6 MG/DL (ref 8.7–10.5)
CHLORIDE SERPL-SCNC: 106 MMOL/L (ref 95–110)
CHOLEST SERPL-MCNC: 194 MG/DL (ref 120–199)
CHOLEST/HDLC SERPL: 4 {RATIO} (ref 2–5)
CO2 SERPL-SCNC: 26 MMOL/L (ref 23–29)
CREAT SERPL-MCNC: 0.8 MG/DL (ref 0.5–1.4)
CREAT UR-MCNC: 50 MG/DL (ref 15–325)
EST. GFR  (NO RACE VARIABLE): >60 ML/MIN/1.73 M^2
ESTIMATED AVG GLUCOSE: 146 MG/DL (ref 68–131)
GLUCOSE SERPL-MCNC: 144 MG/DL (ref 70–110)
HBA1C MFR BLD: 6.7 % (ref 4–5.6)
HDLC SERPL-MCNC: 49 MG/DL (ref 40–75)
HDLC SERPL: 25.3 % (ref 20–50)
LDLC SERPL CALC-MCNC: 110 MG/DL (ref 63–159)
MICROALBUMIN UR DL<=1MG/L-MCNC: 8 UG/ML
NONHDLC SERPL-MCNC: 145 MG/DL
POTASSIUM SERPL-SCNC: 4.9 MMOL/L (ref 3.5–5.1)
PROT SERPL-MCNC: 7.2 G/DL (ref 6–8.4)
SODIUM SERPL-SCNC: 139 MMOL/L (ref 136–145)
TRIGL SERPL-MCNC: 175 MG/DL (ref 30–150)

## 2024-01-17 PROCEDURE — 80061 LIPID PANEL: CPT | Performed by: FAMILY MEDICINE

## 2024-01-17 PROCEDURE — 36415 COLL VENOUS BLD VENIPUNCTURE: CPT | Mod: PO | Performed by: FAMILY MEDICINE

## 2024-01-17 PROCEDURE — 80053 COMPREHEN METABOLIC PANEL: CPT | Performed by: FAMILY MEDICINE

## 2024-01-17 PROCEDURE — 83036 HEMOGLOBIN GLYCOSYLATED A1C: CPT | Performed by: FAMILY MEDICINE

## 2024-01-17 PROCEDURE — 82043 UR ALBUMIN QUANTITATIVE: CPT | Performed by: FAMILY MEDICINE

## 2024-01-24 ENCOUNTER — OFFICE VISIT (OUTPATIENT)
Dept: FAMILY MEDICINE | Facility: CLINIC | Age: 74
End: 2024-01-24
Payer: MEDICARE

## 2024-01-24 VITALS
OXYGEN SATURATION: 96 % | WEIGHT: 144.38 LBS | HEART RATE: 82 BPM | DIASTOLIC BLOOD PRESSURE: 64 MMHG | HEIGHT: 64 IN | BODY MASS INDEX: 24.65 KG/M2 | SYSTOLIC BLOOD PRESSURE: 124 MMHG

## 2024-01-24 DIAGNOSIS — E11.9 CONTROLLED TYPE 2 DIABETES MELLITUS WITHOUT COMPLICATION, WITHOUT LONG-TERM CURRENT USE OF INSULIN: Primary | ICD-10-CM

## 2024-01-24 DIAGNOSIS — E78.5 HYPERLIPIDEMIA LDL GOAL <100: ICD-10-CM

## 2024-01-24 DIAGNOSIS — K21.00 GASTROESOPHAGEAL REFLUX DISEASE WITH ESOPHAGITIS WITHOUT HEMORRHAGE: ICD-10-CM

## 2024-01-24 PROBLEM — E11.49 TYPE II DIABETES MELLITUS WITH NEUROLOGICAL MANIFESTATIONS: Status: RESOLVED | Noted: 2023-01-19 | Resolved: 2024-01-24

## 2024-01-24 PROCEDURE — 3074F SYST BP LT 130 MM HG: CPT | Mod: CPTII,S$GLB,, | Performed by: FAMILY MEDICINE

## 2024-01-24 PROCEDURE — 3066F NEPHROPATHY DOC TX: CPT | Mod: CPTII,S$GLB,, | Performed by: FAMILY MEDICINE

## 2024-01-24 PROCEDURE — 1126F AMNT PAIN NOTED NONE PRSNT: CPT | Mod: CPTII,S$GLB,, | Performed by: FAMILY MEDICINE

## 2024-01-24 PROCEDURE — 99999 PR PBB SHADOW E&M-EST. PATIENT-LVL IV: CPT | Mod: PBBFAC,,, | Performed by: FAMILY MEDICINE

## 2024-01-24 PROCEDURE — 3288F FALL RISK ASSESSMENT DOCD: CPT | Mod: CPTII,S$GLB,, | Performed by: FAMILY MEDICINE

## 2024-01-24 PROCEDURE — 1159F MED LIST DOCD IN RCRD: CPT | Mod: CPTII,S$GLB,, | Performed by: FAMILY MEDICINE

## 2024-01-24 PROCEDURE — 3044F HG A1C LEVEL LT 7.0%: CPT | Mod: CPTII,S$GLB,, | Performed by: FAMILY MEDICINE

## 2024-01-24 PROCEDURE — 99214 OFFICE O/P EST MOD 30 MIN: CPT | Mod: S$GLB,,, | Performed by: FAMILY MEDICINE

## 2024-01-24 PROCEDURE — 3008F BODY MASS INDEX DOCD: CPT | Mod: CPTII,S$GLB,, | Performed by: FAMILY MEDICINE

## 2024-01-24 PROCEDURE — 1101F PT FALLS ASSESS-DOCD LE1/YR: CPT | Mod: CPTII,S$GLB,, | Performed by: FAMILY MEDICINE

## 2024-01-24 PROCEDURE — 3078F DIAST BP <80 MM HG: CPT | Mod: CPTII,S$GLB,, | Performed by: FAMILY MEDICINE

## 2024-01-24 PROCEDURE — 3061F NEG MICROALBUMINURIA REV: CPT | Mod: CPTII,S$GLB,, | Performed by: FAMILY MEDICINE

## 2024-01-24 RX ORDER — METFORMIN HYDROCHLORIDE 500 MG/1
500 TABLET ORAL 2 TIMES DAILY WITH MEALS
Qty: 180 TABLET | Refills: 3 | Status: SHIPPED | OUTPATIENT
Start: 2024-01-24

## 2024-01-24 RX ORDER — ROSUVASTATIN CALCIUM 10 MG/1
10 TABLET, COATED ORAL DAILY
Qty: 90 TABLET | Refills: 3 | Status: SHIPPED | OUTPATIENT
Start: 2024-01-24 | End: 2025-01-23

## 2024-01-24 RX ORDER — PANTOPRAZOLE SODIUM 40 MG/1
40 TABLET, DELAYED RELEASE ORAL DAILY
Qty: 30 TABLET | Refills: 2 | Status: SHIPPED | OUTPATIENT
Start: 2024-01-24 | End: 2024-04-20

## 2024-01-24 NOTE — PROGRESS NOTES
Subjective:       Patient ID: Eulalia Villarreal is a 73 y.o. female.    Chief Complaint: Follow-up (6mo)    Patient presents with:  Follow-up: 6mo    Patient presents with:  Diabetes: 6 month follow up    Having some left hip bursitis.  Following with ortho      DM2 - taking metformin 500mg BID  HLD - taking Crestor 10mg daily with CoQ 10  Daily HA - taking tylenol PRN; using voltaren topical at bedtime  GERD - persistent despite famotidine; taking OTC famotidine 20mg  Sleep apnea - wearing CPAP nightly  Heand arthritis - some chronic thumb pain with activity  saw podiatry and was diagnosed with neuropathy. Taking B12, D and ALA          Diabetes  Hypoglycemia symptoms include headaches (posterior neck with radiation into the scalp.). Pertinent negatives for hypoglycemia include no dizziness. Pertinent negatives for diabetes include no chest pain and no weakness.   Follow-up  Associated symptoms include arthralgias (hands), headaches (posterior neck with radiation into the scalp.) and numbness. Pertinent negatives include no abdominal pain, chest pain, chills, coughing, fever, nausea, neck pain, rash, sore throat, vomiting or weakness.   Headache   Associated symptoms include numbness. Pertinent negatives include no abdominal pain, coughing, dizziness, eye pain, fever, nausea, neck pain, sore throat, vomiting or weakness.   Hip Pain   Associated symptoms include numbness.       Past Medical History:   Diagnosis Date    Arthritis     hands    Diabetes mellitus     Herniated disc     L5    Hyperlipidemia        Past Surgical History:   Procedure Laterality Date    ADENOIDECTOMY      BACK SURGERY  1982    discectomy    FLEXIBLE SIGMOIDOSCOPY  8/7/1990  [facility]    TONSILLECTOMY      WISDOM TOOTH EXTRACTION         Review of patient's allergies indicates:   Allergen Reactions    Statins-hmg-coa reductase inhibitors Other (See Comments)     Severe pain to her left side    Meperidine      Other reaction(s): hyper    Sulfa  (sulfonamide antibiotics) Rash     Other reaction(s): Itching       Social History     Socioeconomic History    Marital status:    Occupational History    Occupation: retired    Tobacco Use    Smoking status: Never    Smokeless tobacco: Never   Substance and Sexual Activity    Alcohol use: No     Comment: rare    Drug use: No    Sexual activity: Never     Social Determinants of Health     Financial Resource Strain: Low Risk  (1/11/2021)    Overall Financial Resource Strain (CARDIA)     Difficulty of Paying Living Expenses: Not very hard   Food Insecurity: No Food Insecurity (1/11/2021)    Hunger Vital Sign     Worried About Running Out of Food in the Last Year: Never true     Ran Out of Food in the Last Year: Never true   Transportation Needs: No Transportation Needs (1/11/2021)    PRAPARE - Transportation     Lack of Transportation (Medical): No     Lack of Transportation (Non-Medical): No   Physical Activity: Inactive (1/11/2021)    Exercise Vital Sign     Days of Exercise per Week: 0 days     Minutes of Exercise per Session: 0 min   Stress: Stress Concern Present (1/11/2021)    [employer] - Occupational Stress Questionnaire     Feeling of Stress : To some extent   Social Connections: Unknown (1/11/2021)    Social Connection and Isolation Panel [NHANES]     Frequency of Communication with Friends and Family: Once a week     Frequency of Social Gatherings with Friends and Family: Once a week     Active Member of Clubs or Organizations: No     Attends Club or Organization Meetings: Never     Marital Status:        Current Outpatient Medications on File Prior to Visit   Medication Sig Dispense Refill    ascorbic acid, vitamin C, (VITAMIN C) 500 MG tablet Take 1 tablet by mouth Daily.      diclofenac sodium (VOLTAREN) 1 % Gel Apply 2 g topically 4 (four) times daily. To hands as needed 100 g 1    famotidine (PEPCID) 10 MG tablet Take 10 mg by mouth 2 (two)  times daily.      glucosamine-D3-Boswellia serr 1,500-400-100 mg-unit-mg Tab Take by mouth.      ketoconazole (NIZORAL) 2 % shampoo Apply topically twice a week. 120 mL 4    tiZANidine (ZANAFLEX) 2 MG tablet TAKE 2 TABLETS (4 MG TOTAL) BY MOUTH NIGHTLY AS NEEDED (HEADACHE). 30 tablet 1    UBIDECARENONE (CO Q-10 ORAL) Take by mouth.      vitamin D 185 MG Tab Take 2,000 mg by mouth once daily.      VITAMIN E ACETATE (VITAMIN E ORAL) Take 1 capsule by mouth Daily.      [DISCONTINUED] metFORMIN (GLUCOPHAGE) 500 MG tablet Take 1 tablet (500 mg total) by mouth 2 (two) times daily with meals. 180 tablet 3    alpha lipoic acid 200 mg Cap Take 200 mg by mouth once daily.      FLUAD QUAD 2021-22,65Y UP,,PF, 60 mcg (15 mcg x 4)/0.5 mL Syrg       FLUAD QUAD 2022-23,65Y UP,,PF, 60 mcg (15 mcg x 4)/0.5 mL Syrg       fluocinonide (LIDEX) 0.05 % external solution Apply topically 2 (two) times daily. (Patient not taking: Reported on 1/24/2024) 60 mL 2    fluocinonide 0.05% (LIDEX) 0.05 % cream Apply topically 2 (two) times daily. (Patient not taking: Reported on 1/24/2024) 60 g 0    [DISCONTINUED] meloxicam (MOBIC) 15 MG tablet Take 1 tablet (15 mg total) by mouth once daily. (Patient not taking: Reported on 1/24/2024) 28 tablet 0    [DISCONTINUED] rosuvastatin (CRESTOR) 10 MG tablet Take 1 tablet (10 mg total) by mouth once daily. 90 tablet 3     No current facility-administered medications on file prior to visit.       Family History   Problem Relation Age of Onset    Diabetes Mother     Coronary artery disease Mother     Diabetes Son     Cancer Maternal Aunt     Cerebral aneurysm Father     Coronary artery disease Sister     Coronary artery disease Brother     Heart disease Neg Hx     Glaucoma Neg Hx     Retinal detachment Neg Hx     Macular degeneration Neg Hx     Melanoma Neg Hx        Review of Systems   Constitutional:  Negative for appetite change, chills, fever and unexpected weight change.   HENT:  Negative for sore throat  "and trouble swallowing.    Eyes:  Negative for pain and visual disturbance.   Respiratory:  Negative for cough, shortness of breath and wheezing.    Cardiovascular:  Negative for chest pain and palpitations.   Gastrointestinal:  Negative for abdominal pain, blood in stool, diarrhea, nausea and vomiting.   Genitourinary:  Negative for difficulty urinating, dysuria and hematuria.   Musculoskeletal:  Positive for arthralgias (hands). Negative for gait problem and neck pain.   Skin:  Negative for rash and wound.   Neurological:  Positive for numbness and headaches (posterior neck with radiation into the scalp.). Negative for dizziness and weakness.   Hematological:  Negative for adenopathy.   Psychiatric/Behavioral:  Negative for dysphoric mood.        Objective:      /64   Pulse 82   Ht 5' 4" (1.626 m)   Wt 65.5 kg (144 lb 6.4 oz)   SpO2 96%   BMI 24.79 kg/m²   Physical Exam  Constitutional:       Appearance: Normal appearance. She is well-developed.   HENT:      Head: Normocephalic.      Mouth/Throat:      Pharynx: No oropharyngeal exudate or posterior oropharyngeal erythema.   Eyes:      Conjunctiva/sclera: Conjunctivae normal.      Pupils: Pupils are equal, round, and reactive to light.   Neck:      Thyroid: No thyromegaly.   Cardiovascular:      Rate and Rhythm: Normal rate and regular rhythm.      Heart sounds: Normal heart sounds, S1 normal and S2 normal. No murmur heard.     No friction rub. No gallop.   Pulmonary:      Effort: Pulmonary effort is normal.      Breath sounds: Normal breath sounds. No wheezing or rales.   Abdominal:      General: Bowel sounds are normal.      Palpations: Abdomen is soft.   Musculoskeletal:      Cervical back: Normal range of motion and neck supple.      Lumbar back: Normal.      Left hip: Normal.      Right lower leg: No edema.      Left lower leg: No edema.   Lymphadenopathy:      Cervical: No cervical adenopathy.   Skin:     General: Skin is warm.      Findings: No " rash.   Neurological:      Mental Status: She is alert and oriented to person, place, and time.      Cranial Nerves: No cranial nerve deficit.      Gait: Gait normal.       Foot exam: inspection normal, sensation intact; 2+ DP pulses bilaterally    Results for orders placed or performed in visit on 01/17/24   Hemoglobin A1C   Result Value Ref Range    Hemoglobin A1C 6.7 (H) 4.0 - 5.6 %    Estimated Avg Glucose 146 (H) 68 - 131 mg/dL   Comprehensive Metabolic Panel   Result Value Ref Range    Sodium 139 136 - 145 mmol/L    Potassium 4.9 3.5 - 5.1 mmol/L    Chloride 106 95 - 110 mmol/L    CO2 26 23 - 29 mmol/L    Glucose 144 (H) 70 - 110 mg/dL    BUN 14 8 - 23 mg/dL    Creatinine 0.8 0.5 - 1.4 mg/dL    Calcium 9.6 8.7 - 10.5 mg/dL    Total Protein 7.2 6.0 - 8.4 g/dL    Albumin 4.0 3.5 - 5.2 g/dL    Total Bilirubin 1.1 (H) 0.1 - 1.0 mg/dL    Alkaline Phosphatase 82 55 - 135 U/L    AST 19 10 - 40 U/L    ALT 18 10 - 44 U/L    eGFR >60.0 >60 mL/min/1.73 m^2    Anion Gap 7 (L) 8 - 16 mmol/L   Lipid Panel   Result Value Ref Range    Cholesterol 194 120 - 199 mg/dL    Triglycerides 175 (H) 30 - 150 mg/dL    HDL 49 40 - 75 mg/dL    LDL Cholesterol 110.0 63.0 - 159.0 mg/dL    HDL/Cholesterol Ratio 25.3 20.0 - 50.0 %    Total Cholesterol/HDL Ratio 4.0 2.0 - 5.0    Non-HDL Cholesterol 145 mg/dL   Microalbumin/Creatinine Ratio, Urine   Result Value Ref Range    Microalbumin, Urine 8.0 ug/mL    Creatinine, Urine 50.0 15.0 - 325.0 mg/dL    Microalb/Creat Ratio 16.0 0.0 - 30.0 ug/mg     Labs reviewed    Assessment:       1. Controlled type 2 diabetes mellitus without complication, without long-term current use of insulin    2. Hyperlipidemia LDL goal <100    3. Gastroesophageal reflux disease with esophagitis without hemorrhage              Plan:       Controlled type 2 diabetes mellitus without complication, without long-term current use of insulin  -     metFORMIN (GLUCOPHAGE) 500 MG tablet; Take 1 tablet (500 mg total) by mouth 2  (two) times daily with meals.  Dispense: 180 tablet; Refill: 3  -     Hemoglobin A1C; Future; Expected date: 07/22/2024  -     Comprehensive Metabolic Panel; Future; Expected date: 07/22/2024    Hyperlipidemia LDL goal <100  -     rosuvastatin (CRESTOR) 10 MG tablet; Take 1 tablet (10 mg total) by mouth once daily.  Dispense: 90 tablet; Refill: 3    Gastroesophageal reflux disease with esophagitis without hemorrhage  -     pantoprazole (PROTONIX) 40 MG tablet; Take 1 tablet (40 mg total) by mouth once daily.  Dispense: 30 tablet; Refill: 2              Stop Pepcid 20mg daily; trial of Protonix for 3 months; cosnider EGD if symptoms persistent  Adivsed her to join gym to assist with weight loss and improved diabetes control; declines additional medication, but Jardiance would be advised.  continue other present meds  F/u 6 months with labs  Counseled on regular exercise, maintenance of a healthy weight, balanced diet rich in fruits/vegetables and lean protein, and avoidance of unhealthy habits like smoking and excessive alcohol intake.

## 2024-02-28 ENCOUNTER — OFFICE VISIT (OUTPATIENT)
Dept: ORTHOPEDICS | Facility: CLINIC | Age: 74
End: 2024-02-28
Payer: MEDICARE

## 2024-02-28 DIAGNOSIS — M70.62 TROCHANTERIC BURSITIS OF BOTH HIPS: Primary | ICD-10-CM

## 2024-02-28 DIAGNOSIS — M70.61 TROCHANTERIC BURSITIS OF BOTH HIPS: Primary | ICD-10-CM

## 2024-02-28 PROCEDURE — 99999 PR PBB SHADOW E&M-EST. PATIENT-LVL III: CPT | Mod: PBBFAC,,, | Performed by: NURSE PRACTITIONER

## 2024-02-28 PROCEDURE — 1101F PT FALLS ASSESS-DOCD LE1/YR: CPT | Mod: CPTII,S$GLB,, | Performed by: NURSE PRACTITIONER

## 2024-02-28 PROCEDURE — 3044F HG A1C LEVEL LT 7.0%: CPT | Mod: CPTII,S$GLB,, | Performed by: NURSE PRACTITIONER

## 2024-02-28 PROCEDURE — 1160F RVW MEDS BY RX/DR IN RCRD: CPT | Mod: CPTII,S$GLB,, | Performed by: NURSE PRACTITIONER

## 2024-02-28 PROCEDURE — 1159F MED LIST DOCD IN RCRD: CPT | Mod: CPTII,S$GLB,, | Performed by: NURSE PRACTITIONER

## 2024-02-28 PROCEDURE — 3066F NEPHROPATHY DOC TX: CPT | Mod: CPTII,S$GLB,, | Performed by: NURSE PRACTITIONER

## 2024-02-28 PROCEDURE — 99213 OFFICE O/P EST LOW 20 MIN: CPT | Mod: 25,S$GLB,, | Performed by: NURSE PRACTITIONER

## 2024-02-28 PROCEDURE — 3288F FALL RISK ASSESSMENT DOCD: CPT | Mod: CPTII,S$GLB,, | Performed by: NURSE PRACTITIONER

## 2024-02-28 PROCEDURE — 20610 DRAIN/INJ JOINT/BURSA W/O US: CPT | Mod: 50,S$GLB,, | Performed by: NURSE PRACTITIONER

## 2024-02-28 PROCEDURE — 3061F NEG MICROALBUMINURIA REV: CPT | Mod: CPTII,S$GLB,, | Performed by: NURSE PRACTITIONER

## 2024-02-28 PROCEDURE — 1125F AMNT PAIN NOTED PAIN PRSNT: CPT | Mod: CPTII,S$GLB,, | Performed by: NURSE PRACTITIONER

## 2024-02-28 RX ORDER — TRIAMCINOLONE ACETONIDE 40 MG/ML
40 INJECTION, SUSPENSION INTRA-ARTICULAR; INTRAMUSCULAR
Status: DISCONTINUED | OUTPATIENT
Start: 2024-02-28 | End: 2024-02-28 | Stop reason: HOSPADM

## 2024-02-28 RX ADMIN — TRIAMCINOLONE ACETONIDE 40 MG: 40 INJECTION, SUSPENSION INTRA-ARTICULAR; INTRAMUSCULAR at 09:02

## 2024-02-28 NOTE — PROGRESS NOTES
Chief Complaint   Patient presents with    Left Hip - Pain    Right Hip - Pain      HPI:   This is a 73 y.o. who presents to clinic today for bilateral hip pain for the past 3 months after no known trauma. Complains of pain while sleeping on side and when descending stairs. Pain is dull and deep. No numbness or tingling. No associated signs or symptoms.      Past Medical History:   Diagnosis Date    Arthritis     hands    Diabetes mellitus     Herniated disc     L5    Hyperlipidemia      Past Surgical History:   Procedure Laterality Date    ADENOIDECTOMY      BACK SURGERY  1982    discectomy    FLEXIBLE SIGMOIDOSCOPY  8/7/1990  [facility]    TONSILLECTOMY      WISDOM TOOTH EXTRACTION       Current Outpatient Medications on File Prior to Visit   Medication Sig Dispense Refill    ascorbic acid, vitamin C, (VITAMIN C) 500 MG tablet Take 1 tablet by mouth Daily.      famotidine (PEPCID) 10 MG tablet Take 10 mg by mouth 2 (two) times daily.      fluocinonide (LIDEX) 0.05 % external solution Apply topically 2 (two) times daily. 60 mL 2    fluocinonide 0.05% (LIDEX) 0.05 % cream Apply topically 2 (two) times daily. 60 g 0    glucosamine-D3-Boswellia serr 1,500-400-100 mg-unit-mg Tab Take by mouth.      ketoconazole (NIZORAL) 2 % shampoo Apply topically twice a week. 120 mL 4    metFORMIN (GLUCOPHAGE) 500 MG tablet Take 1 tablet (500 mg total) by mouth 2 (two) times daily with meals. 180 tablet 3    pantoprazole (PROTONIX) 40 MG tablet Take 1 tablet (40 mg total) by mouth once daily. 30 tablet 2    rosuvastatin (CRESTOR) 10 MG tablet Take 1 tablet (10 mg total) by mouth once daily. 90 tablet 3    tiZANidine (ZANAFLEX) 2 MG tablet TAKE 2 TABLETS (4 MG TOTAL) BY MOUTH NIGHTLY AS NEEDED (HEADACHE). 30 tablet 1    UBIDECARENONE (CO Q-10 ORAL) Take by mouth.      vitamin D 185 MG Tab Take 2,000 mg by mouth once daily.      VITAMIN E ACETATE (VITAMIN E ORAL) Take 1 capsule by mouth Daily.      diclofenac sodium (VOLTAREN) 1 % Gel  Apply 2 g topically 4 (four) times daily. To hands as needed (Patient not taking: Reported on 2/28/2024) 100 g 1    FLUAD QUAD 2021-22,65Y UP,,PF, 60 mcg (15 mcg x 4)/0.5 mL Syrg       FLUAD QUAD 2022-23,65Y UP,,PF, 60 mcg (15 mcg x 4)/0.5 mL Syrg       [DISCONTINUED] alpha lipoic acid 200 mg Cap Take 200 mg by mouth once daily.       No current facility-administered medications on file prior to visit.     Review of patient's allergies indicates:   Allergen Reactions    Meperidine      Other reaction(s): hyper    Sulfa (sulfonamide antibiotics) Rash     Other reaction(s): Itching     Family History   Problem Relation Age of Onset    Diabetes Mother     Coronary artery disease Mother     Diabetes Son     Cancer Maternal Aunt     Cerebral aneurysm Father     Coronary artery disease Sister     Coronary artery disease Brother     Heart disease Neg Hx     Glaucoma Neg Hx     Retinal detachment Neg Hx     Macular degeneration Neg Hx     Melanoma Neg Hx      Social History     Socioeconomic History    Marital status:    Occupational History    Occupation: retired    Tobacco Use    Smoking status: Never    Smokeless tobacco: Never   Substance and Sexual Activity    Alcohol use: No     Comment: rare    Drug use: No    Sexual activity: Never     Social Determinants of Health     Financial Resource Strain: Low Risk  (1/11/2021)    Overall Financial Resource Strain (CARDIA)     Difficulty of Paying Living Expenses: Not very hard   Food Insecurity: No Food Insecurity (1/11/2021)    Hunger Vital Sign     Worried About Running Out of Food in the Last Year: Never true     Ran Out of Food in the Last Year: Never true   Transportation Needs: No Transportation Needs (1/11/2021)    PRAPARE - Transportation     Lack of Transportation (Medical): No     Lack of Transportation (Non-Medical): No   Physical Activity: Inactive (1/11/2021)    Exercise Vital Sign     Days of Exercise per Week: 0 days     Minutes of Exercise  per Session: 0 min   Stress: Stress Concern Present (1/11/2021)    [facility]ational [facility] - Occupational Stress Questionnaire     Feeling of Stress : To some extent   Social Connections: Unknown (1/11/2021)    Social Connection and Isolation Panel [NHANES]     Frequency of Communication with Friends and Family: Once a week     Frequency of Social Gatherings with Friends and Family: Once a week     Active Member of Clubs or Organizations: No     Attends Club or Organization Meetings: Never     Marital Status:        Review of Systems:  Constitutional:  Denies fever or chills   Eyes:  Denies change in visual acuity   HENT:  Denies nasal congestion or sore throat   Respiratory:  Denies cough or shortness of breath   Cardiovascular:  Denies chest pain or edema   GI:  Denies abdominal pain, nausea, vomiting, bloody stools or diarrhea   :  Denies dysuria   Integument:  Denies rash   Neurologic:  Denies headache, focal weakness or sensory changes   Endocrine:  Denies polyuria or polydipsia   Lymphatic:  Denies swollen glands   Psychiatric:  Denies depression or anxiety     Physical Exam:   Constitutional:  Well developed, well nourished, no acute distress, non-toxic appearance   Integument:  Well hydrated  Neurologic:  Alert & oriented x 3  Psychiatric:  Speech and behavior appropriate     Bilateral Hip Exam  Bilateral Hip Exam   Tenderness   The patient is experiencing tenderness in the greater trochanter.  Range of Motion   The patient has normal hip ROM.  Muscle Strength   Abduction: 4/5   Other   Erythema: absent  Sensation: normal  Pulse: present      Assessment & plan    Trochanteric bursitis of both hips  -     Large Joint Aspiration/Injection: bilateral greater trochanteric bursa  -     triamcinolone acetonide injection 40 mg  -     triamcinolone acetonide injection 40 mg    Using an aseptic technique, I injected 5 cc of lidocaine 1% without and 1 cc of kenalog 40mg into the bilateral  Hip. The patient tolerated this well.     Rtc 6 weeks.

## 2024-02-28 NOTE — PROCEDURES
Large Joint Aspiration/Injection: bilateral greater trochanteric bursa    Date/Time: 2/28/2024 9:20 AM    Performed by: Bertha Acosta FNP  Authorized by: Bertha Acosta FNP    Consent Done?:  Yes (Verbal)  Indications:  Pain  Timeout: prior to procedure the correct patient, procedure, and site was verified    Prep: patient was prepped and draped in usual sterile fashion      Local anesthesia used?: Yes    Local anesthetic:  Lidocaine 1% without epinephrine  Anesthetic total (ml):  5      Details:  Needle Size:  21 G  Approach:  Lateral  Location:  Hip  Laterality:  Bilateral  Site:  Bilateral greater trochanteric bursa  Medications (Right):  40 mg triamcinolone acetonide 40 mg/mL  Medications (Left):  40 mg triamcinolone acetonide 40 mg/mL  Patient tolerance:  Patient tolerated the procedure well with no immediate complications

## 2024-04-19 DIAGNOSIS — K21.00 GASTROESOPHAGEAL REFLUX DISEASE WITH ESOPHAGITIS WITHOUT HEMORRHAGE: ICD-10-CM

## 2024-04-19 NOTE — TELEPHONE ENCOUNTER
Refill Routing Note   Medication(s) are not appropriate for processing by [facility] for the following reason(s):        New or recently adjusted medication    [facility] action(s):  Defer        Medication Therapy Plan: FLOS      Appointments  past 12m or future 3m with PCP    Date Provider   Last Visit   1/24/2024 Sunny Gee MD   Next Visit   7/24/2024 Sunny Gee MD   ED visits in past 90 days: 0        Note composed:3:56 PM 04/19/2024

## 2024-04-19 NOTE — TELEPHONE ENCOUNTER
Care Due:                  Date            Visit Type   Department     Provider  --------------------------------------------------------------------------------                                EP [facility]  Last Visit: 01-      CARE ([facility])   KEVIN BO                              EP -                              PRIMARY      [facility]  Next Visit: 07-      CARE ([facility])   KEVIN BO                                                            Last  Test          Frequency    Reason                     Performed    Due Date  --------------------------------------------------------------------------------    HBA1C.......  6 months...  metFORMIN................  01-   07-    Health [facility] Embedded Care Due Messages. Reference number: [id number].   4/19/2024 12:15:33 AM CDT

## 2024-04-20 ENCOUNTER — PATIENT MESSAGE (OUTPATIENT)
Dept: FAMILY MEDICINE | Facility: CLINIC | Age: 74
End: 2024-04-20
Payer: MEDICARE

## 2024-04-20 RX ORDER — PANTOPRAZOLE SODIUM 40 MG/1
40 TABLET, DELAYED RELEASE ORAL
Qty: 90 TABLET | Refills: 3 | Status: SHIPPED | OUTPATIENT
Start: 2024-04-20

## 2024-06-05 ENCOUNTER — OFFICE VISIT (OUTPATIENT)
Dept: OPTOMETRY | Facility: CLINIC | Age: 74
End: 2024-06-05
Payer: MEDICARE

## 2024-06-05 DIAGNOSIS — H25.13 NUCLEAR SCLEROSIS OF BOTH EYES: ICD-10-CM

## 2024-06-05 DIAGNOSIS — H52.7 REFRACTIVE ERROR: ICD-10-CM

## 2024-06-05 DIAGNOSIS — E11.9 TYPE 2 DIABETES MELLITUS WITHOUT RETINOPATHY: Primary | ICD-10-CM

## 2024-06-05 PROCEDURE — 1126F AMNT PAIN NOTED NONE PRSNT: CPT | Mod: CPTII,S$GLB,, | Performed by: OPTOMETRIST

## 2024-06-05 PROCEDURE — 3044F HG A1C LEVEL LT 7.0%: CPT | Mod: CPTII,S$GLB,, | Performed by: OPTOMETRIST

## 2024-06-05 PROCEDURE — 92015 DETERMINE REFRACTIVE STATE: CPT | Mod: S$GLB,,, | Performed by: OPTOMETRIST

## 2024-06-05 PROCEDURE — 92014 COMPRE OPH EXAM EST PT 1/>: CPT | Mod: S$GLB,,, | Performed by: OPTOMETRIST

## 2024-06-05 PROCEDURE — 3066F NEPHROPATHY DOC TX: CPT | Mod: CPTII,S$GLB,, | Performed by: OPTOMETRIST

## 2024-06-05 PROCEDURE — 1159F MED LIST DOCD IN RCRD: CPT | Mod: CPTII,S$GLB,, | Performed by: OPTOMETRIST

## 2024-06-05 PROCEDURE — 99999 PR PBB SHADOW E&M-EST. PATIENT-LVL III: CPT | Mod: PBBFAC,,, | Performed by: OPTOMETRIST

## 2024-06-05 PROCEDURE — 3061F NEG MICROALBUMINURIA REV: CPT | Mod: CPTII,S$GLB,, | Performed by: OPTOMETRIST

## 2024-06-05 PROCEDURE — 1101F PT FALLS ASSESS-DOCD LE1/YR: CPT | Mod: CPTII,S$GLB,, | Performed by: OPTOMETRIST

## 2024-06-05 PROCEDURE — 3288F FALL RISK ASSESSMENT DOCD: CPT | Mod: CPTII,S$GLB,, | Performed by: OPTOMETRIST

## 2024-06-05 PROCEDURE — 2023F DILAT RTA XM W/O RTNOPTHY: CPT | Mod: CPTII,S$GLB,, | Performed by: OPTOMETRIST

## 2024-06-05 PROCEDURE — 1160F RVW MEDS BY RX/DR IN RCRD: CPT | Mod: CPTII,S$GLB,, | Performed by: OPTOMETRIST

## 2024-06-05 NOTE — PROGRESS NOTES
HPI    Pt here for annual DM eye exam DLS- 07/30/23 by outside provider     Pt states her vision is not as clear as she would like, has some blur on   the edges when looking through glasses. DM is well controlled on meds.   Denies F/F  Not currently using GTTS.    Hemoglobin A1C       Date                     Value               Ref Range             Status                01/17/2024               6.7 (H)             4.0 - 5.6 %           Final                Last edited by Naomy Wilkinson on 6/5/2024  2:26 PM.            Assessment /Plan     For exam results, see Encounter Report.    Type 2 diabetes mellitus without retinopathy    Nuclear sclerosis of both eyes    Refractive error      No diabetic retinopathy, no csme. Return in 1 year for dilated eye exam. Optos done, pt chose to defer dilated eye exam  2. Educated pt on presence of cataracts and effects on vision. No surgery at this time. Recheck in one year.  3. New Spectacle Rx given, discussed different options for glasses. RTC 1 year routine eye exam.

## 2024-06-06 ENCOUNTER — PATIENT MESSAGE (OUTPATIENT)
Dept: FAMILY MEDICINE | Facility: CLINIC | Age: 74
End: 2024-06-06
Payer: MEDICARE

## 2024-06-06 DIAGNOSIS — M85.80 OSTEOPENIA, UNSPECIFIED LOCATION: Primary | ICD-10-CM

## 2024-06-10 NOTE — TELEPHONE ENCOUNTER
Bone density is not needed for another and only related information about bone density, not pain sources. We can discuss her concern at her appointment.

## 2024-07-17 ENCOUNTER — LAB VISIT (OUTPATIENT)
Dept: LAB | Facility: HOSPITAL | Age: 74
End: 2024-07-17
Attending: FAMILY MEDICINE
Payer: MEDICARE

## 2024-07-17 DIAGNOSIS — E11.9 CONTROLLED TYPE 2 DIABETES MELLITUS WITHOUT COMPLICATION, WITHOUT LONG-TERM CURRENT USE OF INSULIN: ICD-10-CM

## 2024-07-17 LAB
ALBUMIN SERPL BCP-MCNC: 4 G/DL (ref 3.5–5.2)
ALP SERPL-CCNC: 80 U/L (ref 55–135)
ALT SERPL W/O P-5'-P-CCNC: 19 U/L (ref 10–44)
ANION GAP SERPL CALC-SCNC: 9 MMOL/L (ref 8–16)
AST SERPL-CCNC: 19 U/L (ref 10–40)
BILIRUB SERPL-MCNC: 1.3 MG/DL (ref 0.1–1)
BUN SERPL-MCNC: 16 MG/DL (ref 8–23)
CALCIUM SERPL-MCNC: 9.7 MG/DL (ref 8.7–10.5)
CHLORIDE SERPL-SCNC: 105 MMOL/L (ref 95–110)
CO2 SERPL-SCNC: 25 MMOL/L (ref 23–29)
CREAT SERPL-MCNC: 0.9 MG/DL (ref 0.5–1.4)
EST. GFR  (NO RACE VARIABLE): >60 ML/MIN/1.73 M^2
ESTIMATED AVG GLUCOSE: 157 MG/DL (ref 68–131)
GLUCOSE SERPL-MCNC: 153 MG/DL (ref 70–110)
HBA1C MFR BLD: 7.1 % (ref 4–5.6)
POTASSIUM SERPL-SCNC: 4.7 MMOL/L (ref 3.5–5.1)
PROT SERPL-MCNC: 7 G/DL (ref 6–8.4)
SODIUM SERPL-SCNC: 139 MMOL/L (ref 136–145)

## 2024-07-17 PROCEDURE — 83036 HEMOGLOBIN GLYCOSYLATED A1C: CPT | Performed by: FAMILY MEDICINE

## 2024-07-17 PROCEDURE — 36415 COLL VENOUS BLD VENIPUNCTURE: CPT | Mod: PO | Performed by: FAMILY MEDICINE

## 2024-07-17 PROCEDURE — 80053 COMPREHEN METABOLIC PANEL: CPT | Performed by: FAMILY MEDICINE

## 2024-07-24 ENCOUNTER — OFFICE VISIT (OUTPATIENT)
Dept: FAMILY MEDICINE | Facility: CLINIC | Age: 74
End: 2024-07-24
Payer: MEDICARE

## 2024-07-24 VITALS
WEIGHT: 146.63 LBS | OXYGEN SATURATION: 97 % | BODY MASS INDEX: 25.03 KG/M2 | RESPIRATION RATE: 18 BRPM | SYSTOLIC BLOOD PRESSURE: 146 MMHG | HEART RATE: 79 BPM | DIASTOLIC BLOOD PRESSURE: 74 MMHG | HEIGHT: 64 IN

## 2024-07-24 DIAGNOSIS — E78.5 HYPERLIPIDEMIA LDL GOAL <100: ICD-10-CM

## 2024-07-24 DIAGNOSIS — M70.62 TROCHANTERIC BURSITIS OF BOTH HIPS: ICD-10-CM

## 2024-07-24 DIAGNOSIS — M25.551 BILATERAL HIP PAIN: ICD-10-CM

## 2024-07-24 DIAGNOSIS — M70.61 TROCHANTERIC BURSITIS OF BOTH HIPS: ICD-10-CM

## 2024-07-24 DIAGNOSIS — R51.9 CHRONIC DAILY HEADACHE: ICD-10-CM

## 2024-07-24 DIAGNOSIS — M25.552 BILATERAL HIP PAIN: ICD-10-CM

## 2024-07-24 DIAGNOSIS — E11.9 CONTROLLED TYPE 2 DIABETES MELLITUS WITHOUT COMPLICATION, WITHOUT LONG-TERM CURRENT USE OF INSULIN: Primary | ICD-10-CM

## 2024-07-24 PROCEDURE — 3051F HG A1C>EQUAL 7.0%<8.0%: CPT | Mod: CPTII,S$GLB,, | Performed by: FAMILY MEDICINE

## 2024-07-24 PROCEDURE — 1101F PT FALLS ASSESS-DOCD LE1/YR: CPT | Mod: CPTII,S$GLB,, | Performed by: FAMILY MEDICINE

## 2024-07-24 PROCEDURE — 3066F NEPHROPATHY DOC TX: CPT | Mod: CPTII,S$GLB,, | Performed by: FAMILY MEDICINE

## 2024-07-24 PROCEDURE — 3077F SYST BP >= 140 MM HG: CPT | Mod: CPTII,S$GLB,, | Performed by: FAMILY MEDICINE

## 2024-07-24 PROCEDURE — 1159F MED LIST DOCD IN RCRD: CPT | Mod: CPTII,S$GLB,, | Performed by: FAMILY MEDICINE

## 2024-07-24 PROCEDURE — 3078F DIAST BP <80 MM HG: CPT | Mod: CPTII,S$GLB,, | Performed by: FAMILY MEDICINE

## 2024-07-24 PROCEDURE — 99214 OFFICE O/P EST MOD 30 MIN: CPT | Mod: S$GLB,,, | Performed by: FAMILY MEDICINE

## 2024-07-24 PROCEDURE — 99999 PR PBB SHADOW E&M-EST. PATIENT-LVL III: CPT | Mod: PBBFAC,,, | Performed by: FAMILY MEDICINE

## 2024-07-24 PROCEDURE — 3061F NEG MICROALBUMINURIA REV: CPT | Mod: CPTII,S$GLB,, | Performed by: FAMILY MEDICINE

## 2024-07-24 PROCEDURE — 1125F AMNT PAIN NOTED PAIN PRSNT: CPT | Mod: CPTII,S$GLB,, | Performed by: FAMILY MEDICINE

## 2024-07-24 PROCEDURE — G2211 COMPLEX E/M VISIT ADD ON: HCPCS | Mod: S$GLB,,, | Performed by: FAMILY MEDICINE

## 2024-07-24 PROCEDURE — 3288F FALL RISK ASSESSMENT DOCD: CPT | Mod: CPTII,S$GLB,, | Performed by: FAMILY MEDICINE

## 2024-07-24 PROCEDURE — 3008F BODY MASS INDEX DOCD: CPT | Mod: CPTII,S$GLB,, | Performed by: FAMILY MEDICINE

## 2024-07-24 RX ORDER — TIZANIDINE 2 MG/1
4 TABLET ORAL NIGHTLY PRN
Qty: 30 TABLET | Refills: 5 | Status: SHIPPED | OUTPATIENT
Start: 2024-07-24

## 2024-07-24 NOTE — PROGRESS NOTES
Subjective:       Patient ID: Eulalia Villarreal is a 74 y.o. female.    Chief Complaint: Diabetes (6 month follow up)      Patient presents with:  Diabetes: 6 month follow up    Following with ortho for bilateral hip bursitis. She is getting injections periodically.  Pain is worse on the left and radiates to lateral knee.  Pains have been present for the last year and are worse with prolonged walking.  Tizanidine has been helpful.    DM2 - taking metformin 500mg BID  HLD - taking Crestor 10mg daily with CoQ 10  Daily HA - taking tylenol PRN; using voltaren topical at bedtime  GERD - tcontrolled protonix  Sleep apnea - wearing CPAP nightly  Heand arthritis - some chronic thumb pain with activity  saw podiatry and was diagnosed with neuropathy. Taking B12, D and ALA          Diabetes  Hypoglycemia symptoms include headaches (posterior neck with radiation into the scalp.). Pertinent negatives for hypoglycemia include no dizziness. Pertinent negatives for diabetes include no chest pain and no weakness.   Follow-up  Associated symptoms include arthralgias (hands), headaches (posterior neck with radiation into the scalp.) and numbness. Pertinent negatives include no abdominal pain, chest pain, chills, coughing, fever, nausea, neck pain, rash, sore throat, vomiting or weakness.   Headache   Associated symptoms include numbness. Pertinent negatives include no abdominal pain, coughing, dizziness, eye pain, fever, nausea, neck pain, sore throat, vomiting or weakness.   Hip Pain   Associated symptoms include numbness.       Past Medical History:   Diagnosis Date    Arthritis     hands    Diabetes mellitus     Herniated disc     L5    Hyperlipidemia        Past Surgical History:   Procedure Laterality Date    ADENOIDECTOMY      BACK SURGERY  1982    discectomy    FLEXIBLE SIGMOIDOSCOPY  8/7/1990  [facility]    TONSILLECTOMY      WISDOM TOOTH EXTRACTION         Review of patient's allergies indicates:   Allergen Reactions     Statins-hmg-coa reductase inhibitors Other (See Comments)     Severe pain to her left side    Meperidine      Other reaction(s): hyper    Sulfa (sulfonamide antibiotics) Rash     Other reaction(s): Itching       Social History     Socioeconomic History    Marital status:    Occupational History    Occupation: retired    Tobacco Use    Smoking status: Never    Smokeless tobacco: Never   Substance and Sexual Activity    Alcohol use: No     Comment: rare    Drug use: No    Sexual activity: Never     Social Determinants of Health     Financial Resource Strain: Low Risk  (1/11/2021)    Overall Financial Resource Strain (CARDIA)     Difficulty of Paying Living Expenses: Not very hard   Food Insecurity: No Food Insecurity (1/11/2021)    Hunger Vital Sign     Worried About Running Out of Food in the Last Year: Never true     Ran Out of Food in the Last Year: Never true   Transportation Needs: No Transportation Needs (1/11/2021)    PRAPARE - Transportation     Lack of Transportation (Medical): No     Lack of Transportation (Non-Medical): No   Physical Activity: Inactive (1/11/2021)    Exercise Vital Sign     Days of Exercise per Week: 0 days     Minutes of Exercise per Session: 0 min   Stress: Stress Concern Present (1/11/2021)    [employer] - Occupational Stress Questionnaire     Feeling of Stress : To some extent       Current Outpatient Medications on File Prior to Visit   Medication Sig Dispense Refill    ascorbic acid, vitamin C, (VITAMIN C) 500 MG tablet Take 1 tablet by mouth Daily.      glucosamine-D3-Boswellia serr 1,500-400-100 mg-unit-mg Tab Take by mouth.      metFORMIN (GLUCOPHAGE) 500 MG tablet Take 1 tablet (500 mg total) by mouth 2 (two) times daily with meals. 180 tablet 3    pantoprazole (PROTONIX) 40 MG tablet TAKE 1 TABLET BY MOUTH EVERY DAY 90 tablet 3    rosuvastatin (CRESTOR) 10 MG tablet Take 1 tablet (10 mg total) by mouth once daily. 90 tablet 3     UBIDECARENONE (CO Q-10 ORAL) Take by mouth.      vitamin D 185 MG Tab Take 2,000 mg by mouth once daily.      VITAMIN E ACETATE (VITAMIN E ORAL) Take 1 capsule by mouth Daily.      diclofenac sodium (VOLTAREN) 1 % Gel Apply 2 g topically 4 (four) times daily. To hands as needed (Patient not taking: Reported on 2/28/2024) 100 g 1    FLUAD QUAD 2021-22,65Y UP,,PF, 60 mcg (15 mcg x 4)/0.5 mL Syrg  (Patient not taking: Reported on 7/24/2024)      FLUAD QUAD 2022-23,65Y UP,,PF, 60 mcg (15 mcg x 4)/0.5 mL Syrg  (Patient not taking: Reported on 7/24/2024)      fluocinonide (LIDEX) 0.05 % external solution Apply topically 2 (two) times daily. (Patient not taking: Reported on 7/24/2024) 60 mL 2    fluocinonide 0.05% (LIDEX) 0.05 % cream Apply topically 2 (two) times daily. (Patient not taking: Reported on 7/24/2024) 60 g 0    ketoconazole (NIZORAL) 2 % shampoo Apply topically twice a week. (Patient not taking: Reported on 7/24/2024) 120 mL 4     No current facility-administered medications on file prior to visit.       Family History   Problem Relation Name Age of Onset    Diabetes Mother      Coronary artery disease Mother      Diabetes Son      Cancer Maternal Aunt      Cerebral aneurysm Father      Coronary artery disease Sister      Coronary artery disease Brother      Heart disease Neg Hx      Glaucoma Neg Hx      Retinal detachment Neg Hx      Macular degeneration Neg Hx      Melanoma Neg Hx         Review of Systems   Constitutional:  Negative for appetite change, chills, fever and unexpected weight change.   HENT:  Negative for sore throat and trouble swallowing.    Eyes:  Negative for pain and visual disturbance.   Respiratory:  Negative for cough, shortness of breath and wheezing.    Cardiovascular:  Negative for chest pain and palpitations.   Gastrointestinal:  Negative for abdominal pain, blood in stool, diarrhea, nausea and vomiting.   Genitourinary:  Negative for difficulty urinating, dysuria and hematuria.  "  Musculoskeletal:  Positive for arthralgias (hands). Negative for gait problem and neck pain.   Skin:  Negative for rash and wound.   Neurological:  Positive for numbness and headaches (posterior neck with radiation into the scalp.). Negative for dizziness and weakness.   Hematological:  Negative for adenopathy.   Psychiatric/Behavioral:  Negative for dysphoric mood.        Objective:      BP (!) 146/74   Pulse 79   Resp 18   Ht 5' 4" (1.626 m)   Wt 66.5 kg (146 lb 9.7 oz)   SpO2 97%   BMI 25.16 kg/m²   Physical Exam  Constitutional:       Appearance: Normal appearance. She is well-developed.   HENT:      Head: Normocephalic.      Mouth/Throat:      Pharynx: No oropharyngeal exudate or posterior oropharyngeal erythema.   Eyes:      Conjunctiva/sclera: Conjunctivae normal.      Pupils: Pupils are equal, round, and reactive to light.   Neck:      Thyroid: No thyromegaly.   Cardiovascular:      Rate and Rhythm: Normal rate and regular rhythm.      Heart sounds: Normal heart sounds, S1 normal and S2 normal. No murmur heard.     No friction rub. No gallop.   Pulmonary:      Effort: Pulmonary effort is normal.      Breath sounds: Normal breath sounds. No wheezing or rales.   Abdominal:      General: Bowel sounds are normal.      Palpations: Abdomen is soft.   Musculoskeletal:      Cervical back: Normal range of motion and neck supple.      Lumbar back: Normal.      Right hip: Tenderness present. Decreased range of motion.      Left hip: Tenderness present. Decreased range of motion.      Right lower leg: No edema.      Left lower leg: No edema.   Lymphadenopathy:      Cervical: No cervical adenopathy.   Skin:     General: Skin is warm.      Findings: No rash.   Neurological:      Mental Status: She is alert and oriented to person, place, and time.      Cranial Nerves: No cranial nerve deficit.      Gait: Gait normal.       Foot exam: inspection normal, sensation intact; 2+ DP pulses bilaterally    Results for " orders placed or performed in visit on 07/17/24   Hemoglobin A1C   Result Value Ref Range    Hemoglobin A1C 7.1 (H) 4.0 - 5.6 %    Estimated Avg Glucose 157 (H) 68 - 131 mg/dL   Comprehensive Metabolic Panel   Result Value Ref Range    Sodium 139 136 - 145 mmol/L    Potassium 4.7 3.5 - 5.1 mmol/L    Chloride 105 95 - 110 mmol/L    CO2 25 23 - 29 mmol/L    Glucose 153 (H) 70 - 110 mg/dL    BUN 16 8 - 23 mg/dL    Creatinine 0.9 0.5 - 1.4 mg/dL    Calcium 9.7 8.7 - 10.5 mg/dL    Total Protein 7.0 6.0 - 8.4 g/dL    Albumin 4.0 3.5 - 5.2 g/dL    Total Bilirubin 1.3 (H) 0.1 - 1.0 mg/dL    Alkaline Phosphatase 80 55 - 135 U/L    AST 19 10 - 40 U/L    ALT 19 10 - 44 U/L    eGFR >60.0 >60 mL/min/1.73 m^2    Anion Gap 9 8 - 16 mmol/L     Labs reviewed    Assessment:       1. Controlled type 2 diabetes mellitus without complication, without long-term current use of insulin    2. Hyperlipidemia LDL goal <100    3. Bilateral hip pain    4. Trochanteric bursitis of both hips    5. Chronic daily headache                Plan:       Controlled type 2 diabetes mellitus without complication, without long-term current use of insulin  -     Hemoglobin A1C; Future; Expected date: 10/22/2024  -     Comprehensive Metabolic Panel; Future; Expected date: 10/22/2024  -     empagliflozin (JARDIANCE) 25 mg tablet; Take 1 tablet (25 mg total) by mouth once daily.  Dispense: 90 tablet; Refill: 3    Hyperlipidemia LDL goal <100    Bilateral hip pain    Trochanteric bursitis of both hips  -     Ambulatory referral/consult to Physical/Occupational Therapy; Future; Expected date: 07/31/2024    Chronic daily headache  -     tiZANidine (ZANAFLEX) 2 MG tablet; Take 2 tablets (4 mg total) by mouth nightly as needed (headache).  Dispense: 30 tablet; Refill: 5          PT referral; continue home stretching  Continue Protonix   Begin Jardiance 25mg; continue Metformin  Adivsed her to join gym to assist with weight loss and improved diabetes  control  continue other present meds  F/u 3 months with labs  Counseled on regular exercise, maintenance of a healthy weight, balanced diet rich in fruits/vegetables and lean protein, and avoidance of unhealthy habits like smoking and excessive alcohol intake.      Visit today included increased complexity associated with the care of the episodic problems listed above addressed and managing the longitudinal care of the patient due to the serious and/or complex managed problem(s) listed above.

## 2024-08-05 ENCOUNTER — CLINICAL SUPPORT (OUTPATIENT)
Dept: REHABILITATION | Facility: HOSPITAL | Age: 74
End: 2024-08-05
Payer: MEDICARE

## 2024-08-05 DIAGNOSIS — M70.62 TROCHANTERIC BURSITIS OF BOTH HIPS: ICD-10-CM

## 2024-08-05 DIAGNOSIS — M70.61 TROCHANTERIC BURSITIS OF BOTH HIPS: ICD-10-CM

## 2024-08-05 DIAGNOSIS — R29.898 WEAKNESS OF BOTH LOWER EXTREMITIES: ICD-10-CM

## 2024-08-05 DIAGNOSIS — R26.89 ANTALGIC GAIT: Primary | ICD-10-CM

## 2024-08-05 PROCEDURE — 97140 MANUAL THERAPY 1/> REGIONS: CPT | Mod: PO

## 2024-08-05 PROCEDURE — 97530 THERAPEUTIC ACTIVITIES: CPT | Mod: PO

## 2024-08-05 PROCEDURE — 97161 PT EVAL LOW COMPLEX 20 MIN: CPT | Mod: PO

## 2024-08-08 ENCOUNTER — CLINICAL SUPPORT (OUTPATIENT)
Dept: REHABILITATION | Facility: HOSPITAL | Age: 74
End: 2024-08-08
Payer: MEDICARE

## 2024-08-08 DIAGNOSIS — R29.898 WEAKNESS OF BOTH LOWER EXTREMITIES: ICD-10-CM

## 2024-08-08 DIAGNOSIS — R26.89 ANTALGIC GAIT: Primary | ICD-10-CM

## 2024-08-08 PROCEDURE — 97112 NEUROMUSCULAR REEDUCATION: CPT | Mod: PO,CQ

## 2024-08-08 PROCEDURE — 97140 MANUAL THERAPY 1/> REGIONS: CPT | Mod: PO,CQ

## 2024-08-14 ENCOUNTER — CLINICAL SUPPORT (OUTPATIENT)
Dept: REHABILITATION | Facility: HOSPITAL | Age: 74
End: 2024-08-14
Payer: MEDICARE

## 2024-08-14 DIAGNOSIS — R26.89 ANTALGIC GAIT: Primary | ICD-10-CM

## 2024-08-14 DIAGNOSIS — R29.898 WEAKNESS OF BOTH LOWER EXTREMITIES: ICD-10-CM

## 2024-08-14 PROCEDURE — 97140 MANUAL THERAPY 1/> REGIONS: CPT | Mod: PO,CQ

## 2024-08-14 PROCEDURE — 97112 NEUROMUSCULAR REEDUCATION: CPT | Mod: PO,CQ

## 2024-08-14 NOTE — PROGRESS NOTES
"Physical Therapy Daily Treatment Note     Name: Eulalia Villarreal  Clinic Number: [id number]    Therapy Diagnosis:   Encounter Diagnoses   Name Primary?    Antalgic gait Yes    Weakness of both lower extremities      Physician: Sunny Gee MD    Visit Date: 8/14/2024    Physician Orders: PT Eval and Treat   Medical Diagnosis from Referral: M70.61,M70.62 (ICD-10-CM) - Trochanteric bursitis of both hips  Evaluation Date: 8/5/2024  Authorization Period Expiration: 12/31/24  Plan of Care Expiration: 10/31/24  Progress Note Due: 9/5/24  Date of Surgery: NA  Visit # / Visits authorized: 2/ 20  FOTO: 1/ 3    Time In: 10:00 am  Time Out: 10:55 am  Total Billable Time: 55 minutes    Precautions: Standard    Subjective     Pt reports: decreased bilateral hip pain since last visit. She experienced min increase in residual muscular soreness following first follow up visit and stated "I was tired, I was surprised at how tired I was last time".    She was compliant with home exercise program.  Response to previous treatment: First follow up  Functional change: TBD    Pain: 3/10  Location:  bilateral and L>R hip and buttocks      Objective     Objective measures displayed on progress notes unless otherwise noted       Treatment      EULALIA received the following manual therapy techniques:  Joint mobilizations, Manual traction, Myofacial release, and Soft tissue Mobilization were applied to the: iliacus, gluteus medius, TFL IT band for 8 minutes, including:    Long and short axis traction    EULALIA participated in neuromuscular re-education activities to improve: Balance, Coordination, Proprioception and Posture for 47 minutes. The following activities were included:    Nu Step level 1 x 8 minutes  Hip add/frog stretch 3 x 30 secs  Trunk rotation stretch 3 x 30 secs  SKTC 3 x 30 secs  Davon test position hip flexor stretch 3 x 30 secs    PPT x 30  Small amplitude trunk rotation x 30     Bridges with add ball 3 x 10  Bridges " with abd GTB 3 x 10  Supine clams GTB 3 x 10  SLR 2 x 10 ea  SL hip abd 2 x 10 ea  Prone hip extensions 2 x 10 ea        Home Exercises Provided and Patient Education Provided     Education provided:   - HEP review    Written Home Exercises Provided: Patient instructed to cont prior HEP.  Exercises were reviewed and SUKI was able to demonstrate them prior to the end of the session.  SUKI demonstrated good  understanding of the education provided.     See EMR under Patient Instructions for exercises provided prior visit.    Assessment   Suki returned reporting decreased bilateral hip pains compared to last visit. LE distraction techniques continued with pt continuing to note good relief afterwards. LE flexibility and glute/hip strengthening continued, progressing by introducing SLR for hip flexion strengthening. Treatment was tolerated well with no increase in pain but pt continues to demonstrate muscular and general fatigue by end of visit. Will monitor and attempt to progress per pt's tolerance.      SUKI Is progressing well towards her goals.   Pt prognosis is Good.     Pt will continue to benefit from skilled outpatient physical therapy to address the deficits listed in the problem list box on initial evaluation, provide pt/family education and to maximize pt's level of independence in the home and community environment.     Pt's spiritual, cultural and educational needs considered and pt agreeable to plan of care and goals.     Anticipated barriers to physical therapy: chronic nature and bilateral     Goals: :  STG 3 weeks  1.  Pt to be independent with HEP for increased functional mobility and pain control.  2.  Pt to increase AROM at 55 degs B lumbar rotation for increased functional mobility and transfers.  3.  Pt to decrease pain to less than 2/10 after session for increased functional mobility.     Long Term Goals: 8 weeks   1.  Pt to be independent with pain management strategies and verbalize  2 strategies for increased functional mobility and pain control.  2.  Pt to increase AROM at 60 degs B lumbar rotation for increased functional mobility and transfers.  3.  Pt to decrease pain to less than 0/10 after session for increased functional mobility.  4.  Pt to increase exercise tolerance to 45 min in session for increased functional activity overall.  5.  Pt to increase B hip extension and abduction to 4+/5 for increased functional mobility.     Plan      Plan of care Certification: 8/5/2024 to 10/31/24.     Outpatient Physical Therapy 2 times weekly for 10 weeks to include the following interventions: Cervical/Lumbar Traction, Electrical Stimulation DN, Gait Training, Manual Therapy, Moist Heat/ Ice, Neuromuscular Re-ed, Patient Education, Therapeutic Activities, and Therapeutic Exercise.     Janusz Reddy, PTA

## 2024-08-16 ENCOUNTER — CLINICAL SUPPORT (OUTPATIENT)
Dept: REHABILITATION | Facility: HOSPITAL | Age: 74
End: 2024-08-16
Payer: MEDICARE

## 2024-08-16 DIAGNOSIS — R29.898 WEAKNESS OF BOTH LOWER EXTREMITIES: ICD-10-CM

## 2024-08-16 DIAGNOSIS — R26.89 ANTALGIC GAIT: Primary | ICD-10-CM

## 2024-08-16 PROCEDURE — 97112 NEUROMUSCULAR REEDUCATION: CPT | Mod: PO,CQ

## 2024-08-16 PROCEDURE — 97140 MANUAL THERAPY 1/> REGIONS: CPT | Mod: PO,CQ

## 2024-08-16 NOTE — PROGRESS NOTES
"Physical Therapy Daily Treatment Note     Name: Eulalia Villarreal  Clinic Number: [id number]    Therapy Diagnosis:   Encounter Diagnoses   Name Primary?    Antalgic gait Yes    Weakness of both lower extremities      Physician: Sunny Gee MD    Visit Date: 8/16/2024    Physician Orders: PT Eval and Treat   Medical Diagnosis from Referral: M70.61,M70.62 (ICD-10-CM) - Trochanteric bursitis of both hips  Evaluation Date: 8/5/2024  Authorization Period Expiration: 12/31/24  Plan of Care Expiration: 10/31/24  Progress Note Due: 9/5/24  Date of Surgery: NA  Visit # / Visits authorized: 3/ 20  FOTO: 1/ 3    Time In: *** am  Time Out: *** am  Total Billable Time: ** minutes    Precautions: Standard    Subjective     Pt reports: decreased bilateral hip pain since last visit. She experienced min increase in residual muscular soreness following first follow up visit and stated "I was tired, I was surprised at how tired I was last time".    She was compliant with home exercise program.  Response to previous treatment: First follow up  Functional change: TBD    Pain: 3/10  Location:  bilateral and L>R hip and buttocks      Objective     Objective measures displayed on progress notes unless otherwise noted       Treatment      EULALIA received the following manual therapy techniques:  Joint mobilizations, Manual traction, Myofacial release, and Soft tissue Mobilization were applied to the: iliacus, gluteus medius, TFL IT band for 8 minutes, including:    Long and short axis traction    EULALIA participated in neuromuscular re-education activities to improve: Balance, Coordination, Proprioception and Posture for 47 minutes. The following activities were included:    Nu Step level 1 x 8 minutes  Hip add/frog stretch 3 x 30 secs  Trunk rotation stretch 3 x 30 secs  SKTC 3 x 30 secs  Davon test position hip flexor stretch 3 x 30 secs    PPT x 30  Small amplitude trunk rotation x 30     Bridges with add ball 3 x 10  Bridges with " abd GTB 3 x 10  Supine clams GTB 3 x 10  SLR 2 x 10 ea  SL hip abd 2 x 10 ea  Prone hip extensions 2 x 10 ea        Home Exercises Provided and Patient Education Provided     Education provided:   - HEP review    Written Home Exercises Provided: Patient instructed to cont prior HEP.  Exercises were reviewed and SUKI was able to demonstrate them prior to the end of the session.  SUKI demonstrated good  understanding of the education provided.     See EMR under Patient Instructions for exercises provided prior visit.    Assessment   Suki returned reporting decreased bilateral hip pains compared to last visit. LE distraction techniques continued with pt continuing to note good relief afterwards. LE flexibility and glute/hip strengthening continued, progressing by introducing SLR for hip flexion strengthening. Treatment was tolerated well with no increase in pain but pt continues to demonstrate muscular and general fatigue by end of visit. Will monitor and attempt to progress per pt's tolerance.      SUKI Is progressing well towards her goals.   Pt prognosis is Good.     Pt will continue to benefit from skilled outpatient physical therapy to address the deficits listed in the problem list box on initial evaluation, provide pt/family education and to maximize pt's level of independence in the home and community environment.     Pt's spiritual, cultural and educational needs considered and pt agreeable to plan of care and goals.     Anticipated barriers to physical therapy: chronic nature and bilateral     Goals: :  STG 3 weeks  1.  Pt to be independent with HEP for increased functional mobility and pain control.  2.  Pt to increase AROM at 55 degs B lumbar rotation for increased functional mobility and transfers.  3.  Pt to decrease pain to less than 2/10 after session for increased functional mobility.     Long Term Goals: 8 weeks   1.  Pt to be independent with pain management strategies and verbalize 2  strategies for increased functional mobility and pain control.  2.  Pt to increase AROM at 60 degs B lumbar rotation for increased functional mobility and transfers.  3.  Pt to decrease pain to less than 0/10 after session for increased functional mobility.  4.  Pt to increase exercise tolerance to 45 min in session for increased functional activity overall.  5.  Pt to increase B hip extension and abduction to 4+/5 for increased functional mobility.     Plan      Plan of care Certification: 8/5/2024 to 10/31/24.     Outpatient Physical Therapy 2 times weekly for 10 weeks to include the following interventions: Cervical/Lumbar Traction, Electrical Stimulation DN, Gait Training, Manual Therapy, Moist Heat/ Ice, Neuromuscular Re-ed, Patient Education, Therapeutic Activities, and Therapeutic Exercise.     Janusz Reddy, PTA

## 2024-08-16 NOTE — PROGRESS NOTES
"Physical Therapy Daily Treatment Note     Name: Eulalia Villarreal  Clinic Number: [id number]    Therapy Diagnosis:   Encounter Diagnoses   Name Primary?    Antalgic gait Yes    Weakness of both lower extremities      Physician: Sunny Gee MD    Visit Date: 8/16/2024    Physician Orders: PT Eval and Treat   Medical Diagnosis from Referral: M70.61,M70.62 (ICD-10-CM) - Trochanteric bursitis of both hips  Evaluation Date: 8/5/2024  Authorization Period Expiration: 12/31/24  Plan of Care Expiration: 10/31/24  Progress Note Due: 9/5/24  Date of Surgery: NA  Visit # / Visits authorized: 3/ 20  FOTO: 1/ 3    Time In: 9:55 am  Time Out: 10:55 am  Total Billable Time: 60 minutes    Precautions: Standard    Subjective     Pt reports: decreased B hip pains but is experiencing increased lower back pain levels. She continues to note moderate increase in muscular soreness following each PT visit.    She was compliant with home exercise program.  Response to previous treatment: mod increase in muscular soreness  Functional change: TBD    Pain: 5/10 across lower back   Location:  bilateral and L>R hip and buttocks      Objective     Objective measures displayed on progress notes unless otherwise noted       Treatment      EULALIA received the following manual therapy techniques:  Joint mobilizations, Manual traction, Myofacial release, and Soft tissue Mobilization were applied to the: iliacus, gluteus medius, TFL IT band for 8 minutes, including:    Long and short axis traction    EULALIA participated in neuromuscular re-education activities to improve: Balance, Coordination, Proprioception and Posture for 52 minutes. The following activities were included:    Nu Step level 1 x 8 minutes  Hip add/frog stretch 3 x 30 secs  Trunk rotation stretch 3 x 30 secs  SKTC 3 x 30 secs  Davon test position hip flexor stretch 3 x 30 secs  Seated lumbar flexion ball rollouts x 15 3" hold    PPT x 30  Small amplitude trunk rotation x 30   "   Bridges with add ball 2 x 10  Bridges with abd GTB 2 x 10  Supine clams GTB 2 x 10  SLR 2 x 10 ea  SL hip abd 2 x 10 ea  Prone hip extensions 2 x 10 ea        Home Exercises Provided and Patient Education Provided     Education provided:   - HEP review    Written Home Exercises Provided: Patient instructed to cont prior HEP.  Exercises were reviewed and SUKI was able to demonstrate them prior to the end of the session.  SUKI demonstrated good  understanding of the education provided.     See EMR under Patient Instructions for exercises provided prior visit.    Assessment   Suki returned reporting min bilateral hip pains but has been experiencing increased lower back pain levels. LE distraction techniques continued with pt continuing to note good relief afterwards. LE flexibility and glute/hip strengthening continued. Repetitions on hip/glute strengthening exercises were decreased due to pt's repeated reports of moderate increase in long lasting muscular soreness following each PT visit. She tolerated today's visit without adverse effects. Will monitor and attempt to progress per pt's tolerance.      SUKI Is progressing well towards her goals.   Pt prognosis is Good.     Pt will continue to benefit from skilled outpatient physical therapy to address the deficits listed in the problem list box on initial evaluation, provide pt/family education and to maximize pt's level of independence in the home and community environment.     Pt's spiritual, cultural and educational needs considered and pt agreeable to plan of care and goals.     Anticipated barriers to physical therapy: chronic nature and bilateral     Goals: :  STG 3 weeks  1.  Pt to be independent with HEP for increased functional mobility and pain control.  2.  Pt to increase AROM at 55 degs B lumbar rotation for increased functional mobility and transfers.  3.  Pt to decrease pain to less than 2/10 after session for increased functional  mobility.     Long Term Goals: 8 weeks   1.  Pt to be independent with pain management strategies and verbalize 2 strategies for increased functional mobility and pain control.  2.  Pt to increase AROM at 60 degs B lumbar rotation for increased functional mobility and transfers.  3.  Pt to decrease pain to less than 0/10 after session for increased functional mobility.  4.  Pt to increase exercise tolerance to 45 min in session for increased functional activity overall.  5.  Pt to increase B hip extension and abduction to 4+/5 for increased functional mobility.     Plan      Plan of care Certification: 8/5/2024 to 10/31/24.     Outpatient Physical Therapy 2 times weekly for 10 weeks to include the following interventions: Cervical/Lumbar Traction, Electrical Stimulation DN, Gait Training, Manual Therapy, Moist Heat/ Ice, Neuromuscular Re-ed, Patient Education, Therapeutic Activities, and Therapeutic Exercise.     Janusz Reddy, PTA

## 2024-08-20 ENCOUNTER — CLINICAL SUPPORT (OUTPATIENT)
Dept: REHABILITATION | Facility: HOSPITAL | Age: 74
End: 2024-08-20
Payer: MEDICARE

## 2024-08-20 DIAGNOSIS — R26.89 ANTALGIC GAIT: Primary | ICD-10-CM

## 2024-08-20 DIAGNOSIS — R29.898 WEAKNESS OF BOTH LOWER EXTREMITIES: ICD-10-CM

## 2024-08-20 PROCEDURE — 97112 NEUROMUSCULAR REEDUCATION: CPT | Mod: KX,PO

## 2024-08-20 PROCEDURE — 97530 THERAPEUTIC ACTIVITIES: CPT | Mod: KX,PO

## 2024-08-20 PROCEDURE — 97140 MANUAL THERAPY 1/> REGIONS: CPT | Mod: KX,PO

## 2024-08-20 NOTE — PROGRESS NOTES
Physical Therapy Daily Treatment Note     Name: Eulalia Villarreal  Clinic Number: [id number]    Therapy Diagnosis:   Encounter Diagnoses   Name Primary?    Antalgic gait Yes    Weakness of both lower extremities      Physician: Sunny Gee MD    Visit Date: 8/20/2024    Physician Orders: PT Eval and Treat   Medical Diagnosis from Referral: M70.61,M70.62 (ICD-10-CM) - Trochanteric bursitis of both hips  Evaluation Date: 8/5/2024  Authorization Period Expiration: 12/31/24  Plan of Care Expiration: 10/31/24  Progress Note Due: 9/5/24  Date of Surgery: NA  Visit # / Visits authorized: 4/ 20  FOTO: 1/ 3  Foto 2/3 complete on visit 4    Time In: 1000  am  Time Out: 1055:am  Total Billable Time: 55 minutes [facility] 2 TA 1 MT 1    Precautions: Standard    Subjective     Pt reports: decreased B hip pains but is experiencing increased lower back pain levels moderate today.    She was compliant with home exercise program.  Response to previous treatment: mod increase in muscular soreness  Functional change: walking better     Pain: 5/10 across lower back and 2/10 lateral hips pre-session and 0/10 after manual   Location:  bilateral and L>R hip and buttocks      Objective     Objective measures displayed on progress notes unless otherwise noted       Treatment      EULALIA received the following manual therapy techniques:  Joint mobilizations, Manual traction, Myofacial release, and Soft tissue Mobilization were applied to the: iliacus, gluteus medius, TFL IT band for 15 minutes, including:    STM MFR to iliacus and psoas, QL and gluteus medius and TFL  Long and short axis traction    EULALIA participated in neuromuscular re-education activities to improve: Balance, Coordination, Proprioception and Posture for 30 minutes. The following activities were included:    Nu Step level 1 x 8 minutes  Hip add/frog stretch 3 x 30 secs  Trunk rotation stretch 3 x 30 secs  SKTC 3 x 30 secs  Davon test position hip flexor stretch 3 x 30  "secs  Seated lumbar flexion ball rollouts x 15 3" hold    PPT x 30  Small amplitude trunk rotation x 30     Bridges with add ball 2 x 10  Supine clams GTB 2 x 10  Bridges with abd GTB 2 x 10  SLR 2 x 10 ea  SL hip abd 2 x 10 ea  Prone hip extensions 2 x 10 ea    Therapeutic activities: x 10 min   Supine to sit to stand with cues for body mechanics 5x  Stand step with cues for body mechanics    Next visit add:  Standing reciprocal:  marching, hip extension and lateral toe taps 10x  Sit to stand 10x    Home Exercises Provided and Patient Education Provided     Education provided:   - HEP review    Written Home Exercises Provided: Patient instructed to cont prior HEP.  Exercises were reviewed and SUKI was able to demonstrate them prior to the end of the session.  SUKI demonstrated good  understanding of the education provided.     See EMR under Patient Instructions for exercises provided prior visit.    Assessment   Suki returned reporting min bilateral hip pains but has been experiencing increased lower back pain levels but had good relief with manual therapy and with psoas and iliacus manual relief along with gluteus medius. Pt had full relief in session with hip and back pain with good increased functional mobility and good gait skills after session with good carryover.      SUKI Is progressing well towards her goals.   Pt prognosis is Good.     Pt will continue to benefit from skilled outpatient physical therapy to address the deficits listed in the problem list box on initial evaluation, provide pt/family education and to maximize pt's level of independence in the home and community environment.     Pt's spiritual, cultural and educational needs considered and pt agreeable to plan of care and goals.     Anticipated barriers to physical therapy: chronic nature and bilateral     Goals: :  STG 3 weeks  1.  Pt to be independent with HEP for increased functional mobility and pain control.  2.  Pt to " increase AROM at 55 degs B lumbar rotation for increased functional mobility and transfers.  3.  Pt to decrease pain to less than 2/10 after session for increased functional mobility.     Long Term Goals: 8 weeks   1.  Pt to be independent with pain management strategies and verbalize 2 strategies for increased functional mobility and pain control.  2.  Pt to increase AROM at 60 degs B lumbar rotation for increased functional mobility and transfers.  3.  Pt to decrease pain to less than 0/10 after session for increased functional mobility.  4.  Pt to increase exercise tolerance to 45 min in session for increased functional activity overall.  5.  Pt to increase B hip extension and abduction to 4+/5 for increased functional mobility.     Plan      Plan of care Certification: 8/5/2024 to 10/31/24.     Outpatient Physical Therapy 2 times weekly for 10 weeks to include the following interventions: Cervical/Lumbar Traction, Electrical Stimulation DN, Gait Training, Manual Therapy, Moist Heat/ Ice, Neuromuscular Re-ed, Patient Education, Therapeutic Activities, and Therapeutic Exercise.     Meera Story, PT

## 2024-08-22 ENCOUNTER — CLINICAL SUPPORT (OUTPATIENT)
Dept: REHABILITATION | Facility: HOSPITAL | Age: 74
End: 2024-08-22
Payer: MEDICARE

## 2024-08-22 ENCOUNTER — HOSPITAL ENCOUNTER (OUTPATIENT)
Dept: RADIOLOGY | Facility: HOSPITAL | Age: 74
Discharge: HOME | End: 2024-08-22
Attending: FAMILY MEDICINE
Payer: MEDICARE

## 2024-08-22 DIAGNOSIS — R29.898 WEAKNESS OF BOTH LOWER EXTREMITIES: ICD-10-CM

## 2024-08-22 DIAGNOSIS — Z12.31 ENCOUNTER FOR SCREENING MAMMOGRAM FOR BREAST CANCER: ICD-10-CM

## 2024-08-22 DIAGNOSIS — R26.89 ANTALGIC GAIT: Primary | ICD-10-CM

## 2024-08-22 PROCEDURE — 97112 NEUROMUSCULAR REEDUCATION: CPT | Mod: PO,CQ

## 2024-08-22 PROCEDURE — 77067 SCR MAMMO BI INCL CAD: CPT | Mod: TC,PO

## 2024-08-22 PROCEDURE — 77067 SCR MAMMO BI INCL CAD: CPT | Mod: 26,,, | Performed by: RADIOLOGY

## 2024-08-22 PROCEDURE — 77063 BREAST TOMOSYNTHESIS BI: CPT | Mod: 26,,, | Performed by: RADIOLOGY

## 2024-08-22 PROCEDURE — 97530 THERAPEUTIC ACTIVITIES: CPT | Mod: PO,CQ

## 2024-08-22 NOTE — PROGRESS NOTES
Physical Therapy Daily Treatment Note     Name: Eulalia Villarreal  Clinic Number: [id number]    Therapy Diagnosis:   Encounter Diagnoses   Name Primary?    Antalgic gait Yes    Weakness of both lower extremities      Physician: Sunny Gee MD    Visit Date: 8/22/2024    Physician Orders: PT Eval and Treat   Medical Diagnosis from Referral: M70.61,M70.62 (ICD-10-CM) - Trochanteric bursitis of both hips  Evaluation Date: 8/5/2024  Authorization Period Expiration: 12/31/24  Plan of Care Expiration: 10/31/24  Progress Note Due: 9/5/24  Date of Surgery: NA  Visit # / Visits authorized: 5/ 20  FOTO: 1/ 3  Foto 2/3 complete on visit 4    Time In: 1:55  pm  Time Out: 2:53 pm  Total Billable Time: 53 minutes     Precautions: Standard    Subjective     Pt reports: waking up this morning experiencing increased muscular soreness and stiffness following doing yard work yesterday. Upon arrival for today's treatment she notes min to no lower back or hip pain.    She was compliant with home exercise program.  Response to previous treatment: mod increase in muscular soreness  Functional change: walking better     Pain: 0/10 across lower back and 1/10 lateral hips pre-session    Location:  bilateral and L>R hip and buttocks      Objective     Objective measures displayed on progress notes unless otherwise noted       Treatment      EULALIA received the following manual therapy techniques:  Joint mobilizations, Manual traction, Myofacial release, and Soft tissue Mobilization were applied to the: iliacus, gluteus medius, TFL IT band for 05 minutes, including:    Long and short axis traction  STM MFR to iliacus and psoas, QL and gluteus medius and TFL      EULALIA participated in neuromuscular re-education activities to improve: Balance, Coordination, Proprioception and Posture for 35 minutes. The following activities were included:    Nu Step level 1 x 8 minutes  Hip add/frog stretch 3 x 30 secs  Trunk rotation stretch 3 x 30  "secs  SKTC 3 x 30 secs  Davon test position hip flexor stretch 3 x 30 secs  Seated lumbar flexion ball rollouts x 15 3" hold    PPT x 30  Small amplitude trunk rotation x 30     Bridges with add ball 2 x 10  Supine clams GTB 2 x 10  Bridges with abd GTB 2 x 10  SLR 2 x 10 ea    SL hip abd 2 x 10 ea  Prone hip extensions 2 x 10 ea    Therapeutic activities: x 18 min     Sit to stand 2 x 10  Standing alternating marching 2 x 10 ea  Standing hip ext 2 x 10 ea  Standing lateral toe taps 2 x 10 ea    Supine to sit to stand with cues for body mechanics 5x  Stand step with cues for body mechanics        Home Exercises Provided and Patient Education Provided     Education provided:   - HEP review    Written Home Exercises Provided: Patient instructed to cont prior HEP.  Exercises were reviewed and EULALIA was able to demonstrate them prior to the end of the session.  EULALIA demonstrated good  understanding of the education provided.     See EMR under Patient Instructions for exercises provided prior visit.    Assessment   Eulalia returned reporting min to no  bilateral hip pain or lower back discomfort. Cueing to increase TA activation with PPT prior to lift off motion of bridge exercise helped decreased discomfort in lower back pt noted experiencing during HEP. Standing functional hip strengthening exercises introduced today with pt noting no adverse effects. Will continue to progress per pt's tolerance.    EULALIA Is progressing well towards her goals.   Pt prognosis is Good.     Pt will continue to benefit from skilled outpatient physical therapy to address the deficits listed in the problem list box on initial evaluation, provide pt/family education and to maximize pt's level of independence in the home and community environment.     Pt's spiritual, cultural and educational needs considered and pt agreeable to plan of care and goals.     Anticipated barriers to physical therapy: chronic nature and bilateral     Goals: " :  STG 3 weeks  1.  Pt to be independent with HEP for increased functional mobility and pain control.  2.  Pt to increase AROM at 55 degs B lumbar rotation for increased functional mobility and transfers.  3.  Pt to decrease pain to less than 2/10 after session for increased functional mobility.     Long Term Goals: 8 weeks   1.  Pt to be independent with pain management strategies and verbalize 2 strategies for increased functional mobility and pain control.  2.  Pt to increase AROM at 60 degs B lumbar rotation for increased functional mobility and transfers.  3.  Pt to decrease pain to less than 0/10 after session for increased functional mobility.  4.  Pt to increase exercise tolerance to 45 min in session for increased functional activity overall.  5.  Pt to increase B hip extension and abduction to 4+/5 for increased functional mobility.     Plan      Plan of care Certification: 8/5/2024 to 10/31/24.     Outpatient Physical Therapy 2 times weekly for 10 weeks to include the following interventions: Cervical/Lumbar Traction, Electrical Stimulation DN, Gait Training, Manual Therapy, Moist Heat/ Ice, Neuromuscular Re-ed, Patient Education, Therapeutic Activities, and Therapeutic Exercise.     Janusz Reddy, PTA

## 2024-08-27 ENCOUNTER — CLINICAL SUPPORT (OUTPATIENT)
Dept: REHABILITATION | Facility: HOSPITAL | Age: 74
End: 2024-08-27
Payer: MEDICARE

## 2024-08-27 DIAGNOSIS — R26.89 ANTALGIC GAIT: Primary | ICD-10-CM

## 2024-08-27 DIAGNOSIS — R29.898 WEAKNESS OF BOTH LOWER EXTREMITIES: ICD-10-CM

## 2024-08-27 PROCEDURE — 97530 THERAPEUTIC ACTIVITIES: CPT | Mod: KX,PO

## 2024-08-27 PROCEDURE — 97112 NEUROMUSCULAR REEDUCATION: CPT | Mod: KX,PO

## 2024-08-27 NOTE — PROGRESS NOTES
"Physical Therapy Daily Treatment Note     Name: Eulalia Villarreal  Clinic Number: [id number]    Therapy Diagnosis:   Encounter Diagnoses   Name Primary?    Antalgic gait Yes    Weakness of both lower extremities      Physician: Sunny Gee MD    Visit Date: 8/27/2024    Physician Orders: PT Eval and Treat   Medical Diagnosis from Referral: M70.61,M70.62 (ICD-10-CM) - Trochanteric bursitis of both hips  Evaluation Date: 8/5/2024  Authorization Period Expiration: 12/31/24  Plan of Care Expiration: 10/31/24  Progress Note Due: 9/5/24  Date of Surgery: NA  Visit # / Visits authorized: 5/ 20  FOTO: 1/ 3  Foto 2/3 complete on visit 4    Time In: 1000  am  Time Out: `1055 am  Total Billable Time: 55 minutes (1;1 7614-4276 30 min) NMR 1 TA 1    Precautions: Standard    Subjective     Pt reports: doing great then overdid it with yard work 4 hrs with rest breaks now increased pain today.    She was compliant with home exercise program.  Response to previous treatment: mod increase in muscular soreness  Functional change: walking better     Pain: 3/10 across lower back and 3/10 lateral hips pre-session  and 1/10 post at both back and hip  Location:  bilateral and L>R hip and buttocks      Objective     Objective measures displayed on progress notes unless otherwise noted       Treatment      EULALIA received the following manual therapy techniques:  Joint mobilizations, Manual traction, Myofacial release, and Soft tissue Mobilization were applied to the: iliacus, gluteus medius, TFL IT band for 05 minutes, including:    Long and short axis traction  STM MFR to iliacus and psoas, QL and gluteus medius and TFL      EULALIA participated in neuromuscular re-education activities to improve: Balance, Coordination, Proprioception and Posture for 35 minutes. The following activities were included:    Nu Step level 1 x 8 minutes    Seated lumbar flexion ball rollouts x 15 3" hold    PPT x 30  Small amplitude trunk rotation x 30   "   Bridges with add ball 2 x 10  Supine clams GTB 2 x 10  Bridges with abd GTB 2 x 10  SLR 2 x 10 ea    SL hip abd 2 x 10 ea  Prone hip extensions 2 x 10 ea    Therapeutic activities: x 10 min     Sit to stand 2 x 10  Standing alternating marching 2 x 10 ea  Standing hip ext 2 x 10 ea  Standing lateral toe taps 2 x 10 ea    Supine to sit to stand with cues for body mechanics 5x  Stand step with cues for body mechanics    Therapeutic exercise: x 10 min  Hip add/frog stretch 3 x 30 secs  Trunk rotation stretch 3 x 30 secs  SKTC 3 x 30 secs  Davon test position hip flexor stretch 3 x 60 secs each side    Home Exercises Provided and Patient Education Provided     Education provided:   - HEP review    Written Home Exercises Provided: Patient instructed to cont prior HEP.  Exercises were reviewed and EULALIA was able to demonstrate them prior to the end of the session.  EULALIA demonstrated good  understanding of the education provided.     See EMR under Patient Instructions for exercises provided prior visit.    Assessment   Eulalia with minimal guarding likely due to just fatigue overall and no increased guarding. Pt education with continued rest breaks as needed to avoid overuse but overall continued progress without further strain to muscle but will improve with rest and recovery and return to baseline HEP with good relief.     EULALIA Is progressing well towards her goals.   Pt prognosis is Good.     Pt will continue to benefit from skilled outpatient physical therapy to address the deficits listed in the problem list box on initial evaluation, provide pt/family education and to maximize pt's level of independence in the home and community environment.     Pt's spiritual, cultural and educational needs considered and pt agreeable to plan of care and goals.     Anticipated barriers to physical therapy: chronic nature and bilateral     Goals: :  STG 3 weeks  1.  Pt to be independent with HEP for increased functional  mobility and pain control. progressing  2.  Pt to increase AROM at 55 degs B lumbar rotation for increased functional mobility and transfers.  3.  Pt to decrease pain to less than 2/10 after session for increased functional mobility. progressing     Long Term Goals: 8 weeks   1.  Pt to be independent with pain management strategies and verbalize 2 strategies for increased functional mobility and pain control.  2.  Pt to increase AROM at 60 degs B lumbar rotation for increased functional mobility and transfers.  3.  Pt to decrease pain to less than 0/10 after session for increased functional mobility.  4.  Pt to increase exercise tolerance to 45 min in session for increased functional activity overall.  5.  Pt to increase B hip extension and abduction to 4+/5 for increased functional mobility.     Plan      Plan of care Certification: 8/5/2024 to 10/31/24.     Outpatient Physical Therapy 2 times weekly for 10 weeks to include the following interventions: Cervical/Lumbar Traction, Electrical Stimulation DN, Gait Training, Manual Therapy, Moist Heat/ Ice, Neuromuscular Re-ed, Patient Education, Therapeutic Activities, and Therapeutic Exercise.     Meera Story, PT

## 2024-08-29 ENCOUNTER — CLINICAL SUPPORT (OUTPATIENT)
Dept: REHABILITATION | Facility: HOSPITAL | Age: 74
End: 2024-08-29
Payer: MEDICARE

## 2024-08-29 DIAGNOSIS — R29.898 WEAKNESS OF BOTH LOWER EXTREMITIES: ICD-10-CM

## 2024-08-29 DIAGNOSIS — R26.89 ANTALGIC GAIT: Primary | ICD-10-CM

## 2024-08-29 PROCEDURE — 97112 NEUROMUSCULAR REEDUCATION: CPT | Mod: PO

## 2024-08-29 PROCEDURE — 97530 THERAPEUTIC ACTIVITIES: CPT | Mod: PO

## 2024-08-29 NOTE — PROGRESS NOTES
"Physical Therapy Daily Treatment Note     Name: Eulalia Villarreal  Clinic Number: [id number]    Therapy Diagnosis:   Encounter Diagnoses   Name Primary?    Antalgic gait Yes    Weakness of both lower extremities      Physician: Sunny Gee MD    Visit Date: 8/29/2024    Physician Orders: PT Eval and Treat   Medical Diagnosis from Referral: M70.61,M70.62 (ICD-10-CM) - Trochanteric bursitis of both hips  Evaluation Date: 8/5/2024  Authorization Period Expiration: 12/31/24  Plan of Care Expiration: 10/31/24  Progress Note Due: 9/5/24  Date of Surgery: NA  Visit # / Visits authorized: 7/ 20  FOTO: 1/ 3  Foto 2/3 complete on visit 4    Time In: 1000  am  Time Out: `1055 am  Total Billable Time: 55 minutes 1:1    Precautions: Standard    Subjective     Pt reports: doing good overall with only mild B hip pain today.    She was compliant with home exercise program.  Response to previous treatment: mod increase in muscular soreness  Functional change: walking better     Pain: 3/10 across lower back and 3/10 lateral hips pre-session  and 1/10 post at both back and hip  Location:  bilateral and L>R hip and buttocks      Objective     Objective measures displayed on progress notes unless otherwise noted       Treatment      EULALIA received the following manual therapy techniques:  Joint mobilizations, Manual traction, Myofacial release, and Soft tissue Mobilization were applied to the: iliacus, gluteus medius, TFL IT band for 5 minutes, including:    Long and short axis traction  STM MFR to iliacus and psoas, QL and gluteus medius and TFL      EULALIA participated in neuromuscular re-education activities to improve: Balance, Coordination, Proprioception and Posture for 25 minutes. The following activities were included:    Nu Step level 1 x 8 minutes    Seated lumbar flexion ball rollouts x 15 3" hold    PPT x 30  Small amplitude trunk rotation x 30     Bridges with add ball 2 x 10  Supine clams GTB 2 x 10  Bridges with abd " GTB 2 x 10  SLR 2 x 10 ea    SL hip abd 2 x 10 ea  Prone hip extensions 2 x 10 ea    Therapeutic activities: x 25 min     Sit to stand 2 x 10  Standing alternating marching 2 x 10 ea  Standing hip ext 2 x 10 ea  Standing lateral toe taps 2 x 10 ea  Supine to sit to stand with cues for body mechanics 5x  Stand step with cues for body mechanics    Therapeutic exercise: x 0 min  Hip add/frog stretch 3 x 30 secs  Trunk rotation stretch 3 x 30 secs  SKTC 3 x 30 secs  Davon test position hip flexor stretch 3 x 60 secs each side    Home Exercises Provided and Patient Education Provided     Education provided:   - HEP review    Written Home Exercises Provided: Patient instructed to cont prior HEP.  Exercises were reviewed and SUKI was able to demonstrate them prior to the end of the session.  SUKI demonstrated good  understanding of the education provided.     See EMR under Patient Instructions for exercises provided prior visit.    Assessment   Pt able to perform sidelying clamshell activity well today, but requires verbal cues and trials of finding proper hip/knee ankle for proper muscle activation of gluteus medius. Overall, pt continues to tolerate progressions well at this time and will continue to benefit from loading to tolerance to maximize return of overall function.    SUKI Is progressing well towards her goals.   Pt prognosis is Good.     Pt will continue to benefit from skilled outpatient physical therapy to address the deficits listed in the problem list box on initial evaluation, provide pt/family education and to maximize pt's level of independence in the home and community environment.     Pt's spiritual, cultural and educational needs considered and pt agreeable to plan of care and goals.     Anticipated barriers to physical therapy: chronic nature and bilateral     Goals: :  STG 3 weeks  1.  Pt to be independent with HEP for increased functional mobility and pain control. progressing  2.  Pt to  increase AROM at 55 degs B lumbar rotation for increased functional mobility and transfers.  3.  Pt to decrease pain to less than 2/10 after session for increased functional mobility. progressing     Long Term Goals: 8 weeks   1.  Pt to be independent with pain management strategies and verbalize 2 strategies for increased functional mobility and pain control.  2.  Pt to increase AROM at 60 degs B lumbar rotation for increased functional mobility and transfers.  3.  Pt to decrease pain to less than 0/10 after session for increased functional mobility.  4.  Pt to increase exercise tolerance to 45 min in session for increased functional activity overall.  5.  Pt to increase B hip extension and abduction to 4+/5 for increased functional mobility.     Plan      Plan of care Certification: 8/5/2024 to 10/31/24.     Outpatient Physical Therapy 2 times weekly for 10 weeks to include the following interventions: Cervical/Lumbar Traction, Electrical Stimulation DN, Gait Training, Manual Therapy, Moist Heat/ Ice, Neuromuscular Re-ed, Patient Education, Therapeutic Activities, and Therapeutic Exercise.     Ismael Cabral, PT

## 2024-09-03 ENCOUNTER — CLINICAL SUPPORT (OUTPATIENT)
Dept: REHABILITATION | Facility: HOSPITAL | Age: 74
End: 2024-09-03
Payer: MEDICARE

## 2024-09-03 DIAGNOSIS — R29.898 WEAKNESS OF BOTH LOWER EXTREMITIES: ICD-10-CM

## 2024-09-03 DIAGNOSIS — R26.89 ANTALGIC GAIT: Primary | ICD-10-CM

## 2024-09-03 PROCEDURE — 97112 NEUROMUSCULAR REEDUCATION: CPT | Mod: PO,CQ

## 2024-09-03 PROCEDURE — 97110 THERAPEUTIC EXERCISES: CPT | Mod: PO,CQ

## 2024-09-03 PROCEDURE — 97530 THERAPEUTIC ACTIVITIES: CPT | Mod: PO,CQ

## 2024-09-03 NOTE — PROGRESS NOTES
"Physical Therapy Daily Treatment Note     Name: Eulalia Villarreal  Clinic Number: [id number]    Therapy Diagnosis:   Encounter Diagnoses   Name Primary?    Antalgic gait Yes    Weakness of both lower extremities      Physician: Sunny Gee MD    Visit Date: 9/3/2024    Physician Orders: PT Eval and Treat   Medical Diagnosis from Referral: M70.61,M70.62 (ICD-10-CM) - Trochanteric bursitis of both hips  Evaluation Date: 8/5/2024  Authorization Period Expiration: 12/31/24  Plan of Care Expiration: 10/31/24  Progress Note Due: 9/5/24  Date of Surgery: NA  Visit # / Visits authorized: 8/ 20  FOTO: 1/ 3  Foto 2/3 complete on visit 4    Time In: 10:00  am  Time Out: 11:00 am  Total Billable Time: 55 minutes     Precautions: Standard    Subjective     Pt reports: performing increased walking over the weekend and woke up with increased hip pain levels this morning.    She was compliant with home exercise program.  Response to previous treatment: no adverse effects  Functional change: walking better     Pain: 1/10 across lower back and 3/10 lateral hips pre-session  and 1/10 post at both back and hip  Location:  bilateral and L>R hip and buttocks      Objective     Objective measures displayed on progress notes unless otherwise noted       Treatment      EULALIA received the following manual therapy techniques:  Joint mobilizations, Manual traction, Myofacial release, and Soft tissue Mobilization were applied to the: iliacus, gluteus medius, TFL IT band for 5 minutes, including:    Long and short axis traction  STM MFR to iliacus and psoas, QL and gluteus medius and TFL      EULALIA participated in neuromuscular re-education activities to improve: Balance, Coordination, Proprioception and Posture for 35 minutes. The following activities were included:    Nu Step level 1 x 8 minutes    Seated lumbar flexion ball rollouts x 15 3" hold    PPT x 30  Small amplitude trunk rotation x 30     Bridges with add ball 2 x 10  Supine " clams GTB 2 x 10  Bridges with abd GTB 2 x 10  SLR 2 x 10 ea    SL hip abd 2 x 10 ea  Prone hip extensions 2 x 10 ea    Therapeutic activities: x 10 min     Sit to stand 2 x 10  Standing alternating marching 2 x 10 ea  Standing hip ext 2 x 10 ea  Standing lateral toe taps 2 x 10 ea  Supine to sit to stand with cues for body mechanics 5x  Stand step with cues for body mechanics    Therapeutic exercise: x 10 min  Hip add/frog stretch 3 x 30 secs  Trunk rotation stretch 3 x 30 secs  SKTC 3 x 30 secs  Davon test position hip flexor stretch 3 x 60 secs each side    Home Exercises Provided and Patient Education Provided     Education provided:   - HEP review    Written Home Exercises Provided: Patient instructed to cont prior HEP.  Exercises were reviewed and EULALIA was able to demonstrate them prior to the end of the session.  EULALIA demonstrated good  understanding of the education provided.     See EMR under Patient Instructions for exercises provided prior visit.    Assessment   Eulalia returned reporting experiencing increased muscular soreness in B hips following exercise progressions made last visit and with increased walking during the weekend. LE distractions techniques continued and tolerated well with pt noting good relief. Glute/hip strengthening and mobility exercises then continued and tolerated well with no adverse effects. Will continue to progress per pt's tolerance.    EULALIA Is progressing well towards her goals.   Pt prognosis is Good.     Pt will continue to benefit from skilled outpatient physical therapy to address the deficits listed in the problem list box on initial evaluation, provide pt/family education and to maximize pt's level of independence in the home and community environment.     Pt's spiritual, cultural and educational needs considered and pt agreeable to plan of care and goals.     Anticipated barriers to physical therapy: chronic nature and bilateral     Goals: :  STG 3 weeks  1.   Pt to be independent with HEP for increased functional mobility and pain control. progressing  2.  Pt to increase AROM at 55 degs B lumbar rotation for increased functional mobility and transfers.  3.  Pt to decrease pain to less than 2/10 after session for increased functional mobility. progressing     Long Term Goals: 8 weeks   1.  Pt to be independent with pain management strategies and verbalize 2 strategies for increased functional mobility and pain control.  2.  Pt to increase AROM at 60 degs B lumbar rotation for increased functional mobility and transfers.  3.  Pt to decrease pain to less than 0/10 after session for increased functional mobility.  4.  Pt to increase exercise tolerance to 45 min in session for increased functional activity overall.  5.  Pt to increase B hip extension and abduction to 4+/5 for increased functional mobility.     Plan      Plan of care Certification: 8/5/2024 to 10/31/24.     Outpatient Physical Therapy 2 times weekly for 10 weeks to include the following interventions: Cervical/Lumbar Traction, Electrical Stimulation DN, Gait Training, Manual Therapy, Moist Heat/ Ice, Neuromuscular Re-ed, Patient Education, Therapeutic Activities, and Therapeutic Exercise.     Janusz Reddy, PTA

## 2024-09-05 ENCOUNTER — CLINICAL SUPPORT (OUTPATIENT)
Dept: REHABILITATION | Facility: HOSPITAL | Age: 74
End: 2024-09-05
Payer: MEDICARE

## 2024-09-05 DIAGNOSIS — R29.898 WEAKNESS OF BOTH LOWER EXTREMITIES: ICD-10-CM

## 2024-09-05 DIAGNOSIS — R26.89 ANTALGIC GAIT: Primary | ICD-10-CM

## 2024-09-05 PROCEDURE — 97112 NEUROMUSCULAR REEDUCATION: CPT | Mod: PO,CQ

## 2024-09-05 PROCEDURE — 97110 THERAPEUTIC EXERCISES: CPT | Mod: PO,CQ

## 2024-09-05 PROCEDURE — 97530 THERAPEUTIC ACTIVITIES: CPT | Mod: PO,CQ

## 2024-09-05 NOTE — PROGRESS NOTES
"Physical Therapy Daily Treatment Note     Name: Eulalia Villarreal  Clinic Number: [id number]    Therapy Diagnosis:   Encounter Diagnoses   Name Primary?    Antalgic gait Yes    Weakness of both lower extremities      Physician: Sunny Gee MD    Visit Date: 9/5/2024    Physician Orders: PT Eval and Treat   Medical Diagnosis from Referral: M70.61,M70.62 (ICD-10-CM) - Trochanteric bursitis of both hips  Evaluation Date: 8/5/2024  Authorization Period Expiration: 12/31/24  Plan of Care Expiration: 10/31/24  Progress Note Due: 9/5/24  Date of Surgery: NA  Visit # / Visits authorized: 9/ 20  FOTO: 1/ 3  Foto 2/3 complete on visit 4    Time In: 11:05  am  Time Out: 12:00 pm  Total Billable Time: 55 minutes     Precautions: Standard    Subjective     Pt reports: she cut her grass yesterday and was able to tolerate this yard work for about 2-2.5 hours.     She was compliant with home exercise program.  Response to previous treatment: no adverse effects  Functional change: walking better     Pain: 1/10 across lower back and 3/10 lateral hips pre-session  and 1/10 post at both back and hip  Location:  bilateral and L>R hip and buttocks      Objective     Objective measures displayed on progress notes unless otherwise noted       Treatment      EULALIA received the following manual therapy techniques:  Joint mobilizations, Manual traction, Myofacial release, and Soft tissue Mobilization were applied to the: iliacus, gluteus medius, TFL IT band for 5 minutes, including:    Long and short axis traction  STM MFR to iliacus and psoas, QL and gluteus medius and TFL      EULALIA participated in neuromuscular re-education activities to improve: Balance, Coordination, Proprioception and Posture for 35 minutes. The following activities were included:    Nu Step level 1 x 8 minutes    Seated lumbar flexion ball rollouts x 15 3" hold    PPT x 30  Small amplitude trunk rotation x 30     Bridges with add ball 2 x 10  Supine clams GTB 2 x " 10  Bridges with abd GTB 2 x 10  SLR #2 2 x 10 ea    SL hip abd 2 x 10 ea  Prone hip extensions 2 x 10 ea    Therapeutic activities: x 10 min     Sit to stand 2 x 10  Standing alternating marching 2 x 10 ea  Standing hip ext 2 x 10 ea  Standing lateral toe taps 2 x 10 ea  Supine to sit to stand with cues for body mechanics 5x  Stand step with cues for body mechanics    Therapeutic exercise: x 10 min  Hip add/frog stretch 3 x 30 secs  Trunk rotation stretch 3 x 30 secs  SKTC 3 x 30 secs  Davon test position hip flexor stretch 3 x 60 secs each side    Home Exercises Provided and Patient Education Provided     Education provided:   - HEP review    Written Home Exercises Provided: Patient instructed to cont prior HEP.  Exercises were reviewed and SUKI was able to demonstrate them prior to the end of the session.  SUKI demonstrated good  understanding of the education provided.     See EMR under Patient Instructions for exercises provided prior visit.    Assessment   Continuation and progression of LE flexibility and glute/hip strengthening exercises were tolerated well with appropriate increase in muscular fatigue.  2# ankle weight were added to SLR to further challenge hip flexor musculature. LE distractions techniques continued and tolerated well with pt noting good relief.  Will continue to progress per pt's tolerance.    SUKI Is progressing well towards her goals.   Pt prognosis is Good.     Pt will continue to benefit from skilled outpatient physical therapy to address the deficits listed in the problem list box on initial evaluation, provide pt/family education and to maximize pt's level of independence in the home and community environment.     Pt's spiritual, cultural and educational needs considered and pt agreeable to plan of care and goals.     Anticipated barriers to physical therapy: chronic nature and bilateral     Goals: :  STG 3 weeks  1.  Pt to be independent with HEP for increased functional  mobility and pain control. progressing  2.  Pt to increase AROM at 55 degs B lumbar rotation for increased functional mobility and transfers.  3.  Pt to decrease pain to less than 2/10 after session for increased functional mobility. progressing     Long Term Goals: 8 weeks   1.  Pt to be independent with pain management strategies and verbalize 2 strategies for increased functional mobility and pain control.  2.  Pt to increase AROM at 60 degs B lumbar rotation for increased functional mobility and transfers.  3.  Pt to decrease pain to less than 0/10 after session for increased functional mobility.  4.  Pt to increase exercise tolerance to 45 min in session for increased functional activity overall.  5.  Pt to increase B hip extension and abduction to 4+/5 for increased functional mobility.     Plan      Plan of care Certification: 8/5/2024 to 10/31/24.     Outpatient Physical Therapy 2 times weekly for 10 weeks to include the following interventions: Cervical/Lumbar Traction, Electrical Stimulation DN, Gait Training, Manual Therapy, Moist Heat/ Ice, Neuromuscular Re-ed, Patient Education, Therapeutic Activities, and Therapeutic Exercise.     Janusz Reddy, PTA

## 2024-09-10 ENCOUNTER — CLINICAL SUPPORT (OUTPATIENT)
Dept: REHABILITATION | Facility: HOSPITAL | Age: 74
End: 2024-09-10
Payer: MEDICARE

## 2024-09-10 DIAGNOSIS — R26.89 ANTALGIC GAIT: Primary | ICD-10-CM

## 2024-09-10 DIAGNOSIS — R29.898 WEAKNESS OF BOTH LOWER EXTREMITIES: ICD-10-CM

## 2024-09-10 PROCEDURE — 97112 NEUROMUSCULAR REEDUCATION: CPT | Mod: KX,PO

## 2024-09-10 PROCEDURE — 97110 THERAPEUTIC EXERCISES: CPT | Mod: KX,PO

## 2024-09-10 PROCEDURE — 97140 MANUAL THERAPY 1/> REGIONS: CPT | Mod: KX,PO

## 2024-09-10 NOTE — PROGRESS NOTES
"Physical Therapy Daily Treatment Note     Name: Eulalia Villarreal  Clinic Number: [id number]    Therapy Diagnosis:   Encounter Diagnoses   Name Primary?    Antalgic gait Yes    Weakness of both lower extremities        Physician: Sunny Gee MD    Visit Date: 9/10/2024    Physician Orders: PT Eval and Treat   Medical Diagnosis from Referral: M70.61,M70.62 (ICD-10-CM) - Trochanteric bursitis of both hips  Evaluation Date: 8/5/2024  Authorization Period Expiration: 12/31/24  Plan of Care Expiration: 10/31/24  Progress Note Due: 9/5/24  Date of Surgery: NA  Visit # / Visits authorized: 9/ 20  FOTO: 1/ 3  Foto 2/3 complete on visit 4  Foto 3/3 visit 10     Time In: 955 am  Time Out: 1050 am  Total Billable Time: 55 minutes  [facility] 2  TE 1 MT 1    Precautions: Standard    Subjective     Pt reports: doing great with good pain control but had to do hours of yard work including weed eater and then I'm sore today.    She was compliant with home exercise program.  Response to previous treatment: no adverse effects  Functional change: walking better     Pain: 1/10 across lower back and 3/10 lateral hips pre-session  and 1/10 post at both back and hip  Location:  bilateral and L>R hip and buttocks      Objective     Objective measures displayed on progress notes unless otherwise noted       Treatment      EULALIA received the following manual therapy techniques:  Joint mobilizations, Manual traction, Myofacial release, and Soft tissue Mobilization were applied to the: iliacus, gluteus medius, TFL IT band for  10 minutes, including:    Long and short axis traction  STM MFR to iliacus and psoas, QL and gluteus medius and TFL      EULALIA participated in neuromuscular re-education activities to improve: Balance, Coordination, Proprioception and Posture for 30 minutes. The following activities were included:    Nu Step level 1 x 8 minutes    Seated lumbar flexion ball rollouts x 15 3" hold    PPT x 30  Small amplitude trunk rotation " x 30     Bridges with add ball 2 x 10  Supine clams GTB 2 x 10  Bridges with abd GTB 2 x 10  SLR #2 2 x 10 ea    SL hip abd 2 x 10 ea  Prone hip extensions 2 x 10 ea    Therapeutic activities: x 0 min     Sit to stand 2 x 10  Standing alternating marching 2 x 10 ea  Standing hip ext 2 x 10 ea  Standing lateral toe taps 2 x 10 ea  Supine to sit to stand with cues for body mechanics 5x  Stand step with cues for body mechanics    Therapeutic exercise: x 15 min  Hip add/frog stretch 3 x 30 secs  Trunk rotation stretch 3 x 30 secs  SKTC 3 x 30 secs  Davon test position hip flexor stretch 3 x 60 secs each side  Seated HS stretch 3 x 30 secs    Home Exercises Provided and Patient Education Provided     Education provided:   - HEP review    Written Home Exercises Provided: Patient instructed to cont prior HEP.  Exercises were reviewed and SUKI was able to demonstrate them prior to the end of the session.  SUKI demonstrated good  understanding of the education provided.     See EMR under Patient Instructions for exercises provided prior visit.    Assessment   Pt had good continued progress overall no muscle guarding from yard work just fatigue and general muscle soreness 24 hrs later. Pt tolerated good workload today even with session. Pt reported proper workload with rest breaks and use of stretches to improve pain for independence pain management and HEP.    SUKI Is progressing well towards her goals.   Pt prognosis is Good.     Pt will continue to benefit from skilled outpatient physical therapy to address the deficits listed in the problem list box on initial evaluation, provide pt/family education and to maximize pt's level of independence in the home and community environment.     Pt's spiritual, cultural and educational needs considered and pt agreeable to plan of care and goals.     Anticipated barriers to physical therapy: chronic nature and bilateral     Goals: :  STG 3 weeks  1.  Pt to be independent  with HEP for increased functional mobility and pain control. MET  2.  Pt to increase AROM at 55 degs B lumbar rotation for increased functional mobility and transfers.  3.  Pt to decrease pain to less than 2/10 after session for increased functional mobility. MET     Long Term Goals: 8 weeks   1.  Pt to be independent with pain management strategies and verbalize 2 strategies for increased functional mobility and pain control. MET  2.  Pt to increase AROM at 60 degs B lumbar rotation for increased functional mobility and transfers.  3.  Pt to decrease pain to less than 0/10 after session for increased functional mobility. MET  4.  Pt to increase exercise tolerance to 45 min in session for increased functional activity overall. MET  5.  Pt to increase B hip extension and abduction to 4+/5 for increased functional mobility.     Plan      Plan of care Certification: 8/5/2024 to 10/31/24.     Outpatient Physical Therapy 2 times weekly for 10 weeks to include the following interventions: Cervical/Lumbar Traction, Electrical Stimulation DN, Gait Training, Manual Therapy, Moist Heat/ Ice, Neuromuscular Re-ed, Patient Education, Therapeutic Activities, and Therapeutic Exercise.     Meera Story, PT

## 2024-09-17 ENCOUNTER — CLINICAL SUPPORT (OUTPATIENT)
Dept: REHABILITATION | Facility: HOSPITAL | Age: 74
End: 2024-09-17
Payer: MEDICARE

## 2024-09-17 DIAGNOSIS — R26.89 ANTALGIC GAIT: Primary | ICD-10-CM

## 2024-09-17 DIAGNOSIS — R29.898 WEAKNESS OF BOTH LOWER EXTREMITIES: ICD-10-CM

## 2024-09-17 PROCEDURE — 97112 NEUROMUSCULAR REEDUCATION: CPT | Mod: KX,PO

## 2024-09-17 PROCEDURE — 97530 THERAPEUTIC ACTIVITIES: CPT | Mod: KX,PO

## 2024-09-17 NOTE — PROGRESS NOTES
"Physical Therapy Daily Treatment Note     Name: Eulalia Villarreal  Clinic Number: [id number]    Therapy Diagnosis:   Encounter Diagnoses   Name Primary?    Antalgic gait Yes    Weakness of both lower extremities        Physician: Sunny Gee MD    Visit Date: 9/17/2024    Physician Orders: PT Eval and Treat   Medical Diagnosis from Referral: M70.61,M70.62 (ICD-10-CM) - Trochanteric bursitis of both hips  Evaluation Date: 8/5/2024  Authorization Period Expiration: 12/31/24  Plan of Care Expiration: 10/31/24  Progress Note Due: 9/5/24  Date of Surgery: NA  Visit # / Visits authorized: 9/ 20  FOTO: 1/ 3  Foto 2/3 complete on visit 4  Foto 3/3 visit 10     Time In: 1005 am  Time Out: 1100 am  Total Billable Time: 55 minutes (1018- 1100 42 min) NMR 2  TA 1    Precautions: Standard    Subjective     Pt reports: doing great with good pain control but had to do hours of yard work no problems but the following day 2 hrs of gardening led to pain and stiffness.     She was compliant with home exercise program.  Response to previous treatment: no adverse effects  Functional change: walking better     Pain: 1/10 across lower back and 1/10 lateral hips pre-session  and 0/10 post at both back and hip  Location:  bilateral and L>R hip and buttocks      Objective     Objective measures displayed on progress notes unless otherwise noted       Treatment      EULALIA received the following manual therapy techniques:  Joint mobilizations, Manual traction, Myofacial release, and Soft tissue Mobilization were applied to the: iliacus, gluteus medius, TFL IT band for  4 minutes, including:    Long and short axis traction  STM MFR to iliacus and psoas, QL and gluteus medius and TFL      EULALIA participated in neuromuscular re-education activities to improve: Balance, Coordination, Proprioception and Posture for 24 minutes. The following activities were included:    Nu Step level 1 x 8 minutes    Seated lumbar flexion ball rollouts x 15 3" " hold    PPT x 30  Small amplitude trunk rotation x 30     Bridges with add ball 2 x 10  Supine clams GTB 2 x 10  Bridges with abd GTB 2 x 10  SLR #2 2 x 10 ea    SL hip abd 2 x 10 ea  Prone hip extensions 2 x 10 ea    Therapeutic activities: x 15 min     Sit to stand 2 x 10  Standing alternating marching 2 x 10 ea  Standing hip ext 2 x 10 ea  Standing lateral toe taps 2 x 10 ea  Supine to sit to stand with cues for body mechanics 5x  Stand step with cues for body mechanics    Therapeutic exercise: x 12 min  Hip add/frog stretch 3 x 30 secs  Trunk rotation stretch 3 x 30 secs  SKTC 3 x 30 secs  Davon test position hip flexor stretch 3 x 60 secs each side  Seated HS stretch 3 x 30 secs    Home Exercises Provided and Patient Education Provided     Education provided:   - HEP review    Written Home Exercises Provided: Patient instructed to cont prior HEP.  Exercises were reviewed and SUKI was able to demonstrate them prior to the end of the session.  SUKI demonstrated good  understanding of the education provided.     See EMR under Patient Instructions for exercises provided prior visit.    Assessment   Pt had good continued progress but had pain with 2nd consecutive day of outdoor work but no pain after full rest today. Pt tolerated workload without issues and progressing with increased exercise tolerance and functional activity tolerance in session and at home. Pt was given education with workload management to avoid consecutive days of overuse to avoid return of trochanteric bursitis. Pt was receptive to education.    SUKI Is progressing well towards her goals.   Pt prognosis is Good.     Pt will continue to benefit from skilled outpatient physical therapy to address the deficits listed in the problem list box on initial evaluation, provide pt/family education and to maximize pt's level of independence in the home and community environment.     Pt's spiritual, cultural and educational needs considered and  pt agreeable to plan of care and goals.     Anticipated barriers to physical therapy: chronic nature and bilateral     Goals: :  STG 3 weeks  1.  Pt to be independent with HEP for increased functional mobility and pain control. MET  2.  Pt to increase AROM at 55 degs B lumbar rotation for increased functional mobility and transfers.  3.  Pt to decrease pain to less than 2/10 after session for increased functional mobility. MET     Long Term Goals: 8 weeks   1.  Pt to be independent with pain management strategies and verbalize 2 strategies for increased functional mobility and pain control. MET  2.  Pt to increase AROM at 60 degs B lumbar rotation for increased functional mobility and transfers.  3.  Pt to decrease pain to less than 0/10 after session for increased functional mobility. MET  4.  Pt to increase exercise tolerance to 45 min in session for increased functional activity overall. MET  5.  Pt to increase B hip extension and abduction to 4+/5 for increased functional mobility.     Plan      Plan of care Certification: 8/5/2024 to 10/31/24.     Outpatient Physical Therapy 2 times weekly for 10 weeks to include the following interventions: Cervical/Lumbar Traction, Electrical Stimulation DN, Gait Training, Manual Therapy, Moist Heat/ Ice, Neuromuscular Re-ed, Patient Education, Therapeutic Activities, and Therapeutic Exercise.     Meera Story, PT

## 2024-09-20 ENCOUNTER — CLINICAL SUPPORT (OUTPATIENT)
Dept: REHABILITATION | Facility: HOSPITAL | Age: 74
End: 2024-09-20
Payer: MEDICARE

## 2024-09-20 DIAGNOSIS — R29.898 WEAKNESS OF BOTH LOWER EXTREMITIES: ICD-10-CM

## 2024-09-20 DIAGNOSIS — R26.89 ANTALGIC GAIT: Primary | ICD-10-CM

## 2024-09-20 PROCEDURE — 97110 THERAPEUTIC EXERCISES: CPT | Mod: PO,CQ

## 2024-09-20 PROCEDURE — 97530 THERAPEUTIC ACTIVITIES: CPT | Mod: PO,CQ

## 2024-09-20 PROCEDURE — 97112 NEUROMUSCULAR REEDUCATION: CPT | Mod: PO,CQ

## 2024-09-20 NOTE — PROGRESS NOTES
"Physical Therapy Daily Treatment Note     Name: Eulalia Villarreal  Clinic Number: [id number]    Therapy Diagnosis:   Encounter Diagnoses   Name Primary?    Antalgic gait Yes    Weakness of both lower extremities        Physician: Sunny Gee MD    Visit Date: 9/20/2024    Physician Orders: PT Eval and Treat   Medical Diagnosis from Referral: M70.61,M70.62 (ICD-10-CM) - Trochanteric bursitis of both hips  Evaluation Date: 8/5/2024  Authorization Period Expiration: 12/31/24  Plan of Care Expiration: 10/31/24  Progress Note Due: 9/5/24  Date of Surgery: NA  Visit # / Visits authorized: 12/ 20  FOTO: 1/ 3  Foto 2/3 complete on visit 4  Foto 3/3 visit 10     Time In: 9:50 am  Time Out: 10:50 am  Total Billable Time: 55 minutes     Precautions: Standard    Subjective     Pt reports: has not done any yard work recently and has not experienced in hip pain or soreness.    She was compliant with home exercise program.  Response to previous treatment: no adverse effects  Functional change: walking better     Pain: 0/10 across lower back and 0/10 lateral hips pre-session  and 0/10 post at both back and hip  Location:  bilateral and L>R hip and buttocks      Objective     Objective measures displayed on progress notes unless otherwise noted       Treatment      EULALIA received the following manual therapy techniques:  Joint mobilizations, Manual traction, Myofacial release, and Soft tissue Mobilization were applied to the: iliacus, gluteus medius, TFL IT band for 05 minutes, including:    Long and short axis traction  STM MFR to iliacus and psoas, QL and gluteus medius and TFL      EULALIA participated in neuromuscular re-education activities to improve: Balance, Coordination, Proprioception and Posture for 25 minutes. The following activities were included:    Nu Step level 1 x 8 minutes    Seated lumbar flexion ball rollouts x 15 3" hold    PPT x 30  Small amplitude trunk rotation x 30     Bridges with add ball 2 x " 10  Supine clams GTB 2 x 10  Bridges with abd GTB 2 x 10  SLR #2 2 x 10 ea    SL hip abd 2 x 10 ea  Prone hip extensions 2 x 10 ea    Therapeutic activities: x 15 min     Sit to stand 2 x 10  Leg press 20# 2 x 10  Standing alternating marching 2 x 10 ea  Standing hip ext 2 x 10 ea  Standing lateral toe taps 2 x 10 ea  Supine to sit to stand with cues for body mechanics 5x  Stand step with cues for body mechanics    Therapeutic exercise: x 15 min  Hip add/frog stretch 3 x 30 secs  Trunk rotation stretch 3 x 30 secs  SKTC 3 x 30 secs  Davon test position hip flexor stretch 3 x 60 secs each side  Seated HS stretch 3 x 30 secs    Home Exercises Provided and Patient Education Provided     Education provided:   - HEP review    Written Home Exercises Provided: Patient instructed to cont prior HEP.  Exercises were reviewed and SUKI was able to demonstrate them prior to the end of the session.  SUKI demonstrated good  understanding of the education provided.     See EMR under Patient Instructions for exercises provided prior visit.    Assessment   Pt continues to note good pain control with no hip/lower back pain upon arrival for today's treatment. Functional LE strengthening, hip flexibility, and core stabilization exercises continued with the addition of leg press at light resistance. Pt reported difficulty with this exercise progressions but no adverse effects experienced afterwards. Will continue to progress per pt's tolerance.    SUKI Is progressing well towards her goals.   Pt prognosis is Good.     Pt will continue to benefit from skilled outpatient physical therapy to address the deficits listed in the problem list box on initial evaluation, provide pt/family education and to maximize pt's level of independence in the home and community environment.     Pt's spiritual, cultural and educational needs considered and pt agreeable to plan of care and goals.     Anticipated barriers to physical therapy: chronic  nature and bilateral     Goals: :  STG 3 weeks  1.  Pt to be independent with HEP for increased functional mobility and pain control. MET  2.  Pt to increase AROM at 55 degs B lumbar rotation for increased functional mobility and transfers.  3.  Pt to decrease pain to less than 2/10 after session for increased functional mobility. MET     Long Term Goals: 8 weeks   1.  Pt to be independent with pain management strategies and verbalize 2 strategies for increased functional mobility and pain control. MET  2.  Pt to increase AROM at 60 degs B lumbar rotation for increased functional mobility and transfers.  3.  Pt to decrease pain to less than 0/10 after session for increased functional mobility. MET  4.  Pt to increase exercise tolerance to 45 min in session for increased functional activity overall. MET  5.  Pt to increase B hip extension and abduction to 4+/5 for increased functional mobility.     Plan      Plan of care Certification: 8/5/2024 to 10/31/24.     Outpatient Physical Therapy 2 times weekly for 10 weeks to include the following interventions: Cervical/Lumbar Traction, Electrical Stimulation DN, Gait Training, Manual Therapy, Moist Heat/ Ice, Neuromuscular Re-ed, Patient Education, Therapeutic Activities, and Therapeutic Exercise.     Jansuz Reddy, PTA

## 2024-09-24 ENCOUNTER — CLINICAL SUPPORT (OUTPATIENT)
Dept: REHABILITATION | Facility: HOSPITAL | Age: 74
End: 2024-09-24
Payer: MEDICARE

## 2024-09-24 DIAGNOSIS — R29.898 WEAKNESS OF BOTH LOWER EXTREMITIES: ICD-10-CM

## 2024-09-24 DIAGNOSIS — R26.89 ANTALGIC GAIT: Primary | ICD-10-CM

## 2024-09-24 PROCEDURE — 97112 NEUROMUSCULAR REEDUCATION: CPT | Mod: KX,PO

## 2024-09-24 PROCEDURE — 97140 MANUAL THERAPY 1/> REGIONS: CPT | Mod: KX,PO

## 2024-09-24 PROCEDURE — 97530 THERAPEUTIC ACTIVITIES: CPT | Mod: KX,PO

## 2024-09-24 NOTE — PROGRESS NOTES
"Physical Therapy Daily Treatment Note     Name: Eulalia Villarreal  Clinic Number: [id number]    Therapy Diagnosis:   Encounter Diagnoses   Name Primary?    Antalgic gait Yes    Weakness of both lower extremities        Physician: Sunny Gee MD    Visit Date: 9/24/2024    Physician Orders: PT Eval and Treat   Medical Diagnosis from Referral: M70.61,M70.62 (ICD-10-CM) - Trochanteric bursitis of both hips  Evaluation Date: 8/5/2024  Authorization Period Expiration: 12/31/24  Plan of Care Expiration: 10/31/24  Progress Note Due: 9/5/24  Date of Surgery: NA  Visit # / Visits authorized: 12/ 20  FOTO: 1/ 3  Foto 2/3 complete on visit 4  Foto 3/3 visit 10 achieved goal with 61%    Time In: 1000 am  Time Out: 1055 am  Total Billable Time: 55 minutes (2357-5340 1:1)  MT 1 NMR 1 TA 1    Precautions: Standard    Subjective     Pt reports: increased LBP with increased consecutive days of activity    She was compliant with home exercise program.  Response to previous treatment: no adverse effects  Functional change: walking better     Pain: 0/10 across lower back and 0/10 lateral hips pre-session  and 0/10 post at both back and hip  Location:  bilateral and L>R hip and buttocks      Objective     Objective measures displayed on progress notes unless otherwise noted       Treatment      EULALIA received the following manual therapy techniques:  Joint mobilizations, Manual traction, Myofacial release, and Soft tissue Mobilization were applied to the: iliacus, gluteus medius, TFL IT band for 10 minutes, including:    Long and short axis traction  STM MFR to iliacus and psoas, QL and gluteus medius and TFL      EULALIA participated in neuromuscular re-education activities to improve: Balance, Coordination, Proprioception and Posture for 25 minutes. The following activities were included:    Nu Step level 1 x 8 minutes    Seated lumbar flexion ball rollouts x 15 3" hold    PPT x 30  Small amplitude trunk rotation x 30     Bridges " with add ball 2 x 10  Supine clams GTB 2 x 10  Bridges with abd GTB 2 x 10  SLR #2 2 x 10 ea    SL hip abd 2 x 10 ea  Prone hip extensions 2 x 10 ea    Therapeutic activities: x 10 min     Sit to stand 2 x 10  Leg press 20# 2 x 10  Standing alternating marching 2 x 10 ea  Standing hip ext 2 x 10 ea  Standing lateral toe taps 2 x 10 ea  Supine to sit to stand with cues for body mechanics 5x  Stand step with cues for body mechanics    Therapeutic exercise: x 10 min    Hip add/frog stretch 3 x 30 secs  Trunk rotation stretch 3 x 30 secs  SKTC 3 x 30 secs  Davon test position hip flexor stretch 2 x 60 secs each side  Seated HS stretch 3 x 30 secs    Home Exercises Provided and Patient Education Provided     Education provided:   - HEP review    Written Home Exercises Provided: Patient instructed to cont prior HEP.  Exercises were reviewed and SUKI was able to demonstrate them prior to the end of the session.  SUKI demonstrated good  understanding of the education provided.     See EMR under Patient Instructions for exercises provided prior visit.    Assessment   Pt continues with good pain management but consecutive days of activity led to increased pain overall with increased activity with weekend. Pt had reduced pain and tightness with session and tolerated most of her exercise routine without complaints of pain.     SUKI Is progressing well towards her goals.   Pt prognosis is Good.     Pt will continue to benefit from skilled outpatient physical therapy to address the deficits listed in the problem list box on initial evaluation, provide pt/family education and to maximize pt's level of independence in the home and community environment.     Pt's spiritual, cultural and educational needs considered and pt agreeable to plan of care and goals.     Anticipated barriers to physical therapy: chronic nature and bilateral     Goals: :  STG 3 weeks  1.  Pt to be independent with HEP for increased functional  mobility and pain control. MET  2.  Pt to increase AROM at 55 degs B lumbar rotation for increased functional mobility and transfers.  3.  Pt to decrease pain to less than 2/10 after session for increased functional mobility. MET     Long Term Goals: 8 weeks   1.  Pt to be independent with pain management strategies and verbalize 2 strategies for increased functional mobility and pain control. MET  2.  Pt to increase AROM at 60 degs B lumbar rotation for increased functional mobility and transfers.  3.  Pt to decrease pain to less than 0/10 after session for increased functional mobility. MET  4.  Pt to increase exercise tolerance to 45 min in session for increased functional activity overall. MET  5.  Pt to increase B hip extension and abduction to 4+/5 for increased functional mobility.     Plan      Plan of care Certification: 8/5/2024 to 10/31/24.     Outpatient Physical Therapy 2 times weekly for 10 weeks to include the following interventions: Cervical/Lumbar Traction, Electrical Stimulation DN, Gait Training, Manual Therapy, Moist Heat/ Ice, Neuromuscular Re-ed, Patient Education, Therapeutic Activities, and Therapeutic Exercise.     Meera Story, PT

## 2024-09-25 DIAGNOSIS — Z00.00 ENCOUNTER FOR MEDICARE ANNUAL WELLNESS EXAM: ICD-10-CM

## 2024-09-26 ENCOUNTER — CLINICAL SUPPORT (OUTPATIENT)
Dept: REHABILITATION | Facility: HOSPITAL | Age: 74
End: 2024-09-26
Payer: MEDICARE

## 2024-09-26 DIAGNOSIS — R29.898 WEAKNESS OF BOTH LOWER EXTREMITIES: ICD-10-CM

## 2024-09-26 DIAGNOSIS — R26.89 ANTALGIC GAIT: Primary | ICD-10-CM

## 2024-09-26 PROCEDURE — 97530 THERAPEUTIC ACTIVITIES: CPT | Mod: PO,CQ

## 2024-09-26 PROCEDURE — 97112 NEUROMUSCULAR REEDUCATION: CPT | Mod: PO,CQ

## 2024-09-26 PROCEDURE — 97110 THERAPEUTIC EXERCISES: CPT | Mod: PO,CQ

## 2024-09-26 NOTE — PROGRESS NOTES
"Physical Therapy Daily Treatment Note     Name: Eulalia Villarreal  Clinic Number: [id number]    Therapy Diagnosis:   Encounter Diagnoses   Name Primary?    Antalgic gait Yes    Weakness of both lower extremities        Physician: Sunny Gee MD    Visit Date: 9/26/2024    Physician Orders: PT Eval and Treat   Medical Diagnosis from Referral: M70.61,M70.62 (ICD-10-CM) - Trochanteric bursitis of both hips  Evaluation Date: 8/5/2024  Authorization Period Expiration: 12/31/24  Plan of Care Expiration: 10/31/24  Progress Note Due: 9/5/24  Date of Surgery: NA  Visit # / Visits authorized: 14/ 20  FOTO: 1/ 3  Foto 2/3 complete on visit 4  Foto 3/3 visit 10 achieved goal with 61%    Time In: 10:00 am  Time Out: 10:53 am  Total Billable Time: 53 minutes     Precautions: Standard    Subjective     Pt reports: no adverse effects and decreased L hip following dry needling last visit.    She was compliant with home exercise program.  Response to previous treatment: no adverse effects  Functional change: walking better     Pain: 0/10 across lower back and 0/10 lateral hips pre-session  and 0/10 post at both back and hip  Location:  bilateral and L>R hip and buttocks      Objective     Objective measures displayed on progress notes unless otherwise noted       Treatment      EULALIA received the following manual therapy techniques:  Joint mobilizations, Manual traction, Myofacial release, and Soft tissue Mobilization were applied to the: iliacus, gluteus medius, TFL IT band for 00 minutes, including:    Long and short axis traction  STM MFR to iliacus and psoas, QL and gluteus medius and TFL      EULALIA participated in neuromuscular re-education activities to improve: Balance, Coordination, Proprioception and Posture for 23 minutes. The following activities were included:    Nu Step level 1 x 8 minutes    Seated lumbar flexion ball rollouts x 15 3" hold    PPT x 30  Small amplitude trunk rotation x 30     Bridges with add ball " 2 x 10  Supine clams GTB 2 x 10  Bridges with abd GTB 2 x 10  SLR #2 2 x 10 ea    SL hip abd 2 x 10 ea  Prone hip extensions 2 x 10 ea    Therapeutic activities: x 20 min     Sit to stand 2 x 10  Leg press 20# 2 x 10  Standing alternating marching 2 x 10 ea  Standing hip ext 2 x 10 ea  Standing lateral toe taps 2 x 10 ea  Supine to sit to stand with cues for body mechanics 5x  Stand step with cues for body mechanics  Step ups 6 inch step x 10 ea  Lateral steps RTB x 2 laps of 10 steps ea    Therapeutic exercise: x 10 min  Recumbent bike x 8 minutes  Hip add/frog stretch 3 x 30 secs  Trunk rotation stretch 3 x 30 secs  SKTC 3 x 30 secs  Davon test position hip flexor stretch 2 x 60 secs each side  Seated HS stretch 3 x 30 secs    Home Exercises Provided and Patient Education Provided     Education provided:   - HEP review    Written Home Exercises Provided: Patient instructed to cont prior HEP.  Exercises were reviewed and SUKI was able to demonstrate them prior to the end of the session.  SUKI demonstrated good  understanding of the education provided.     See EMR under Patient Instructions for exercises provided prior visit.    Assessment   Pt experienced no adverse effects and notes decreased pain levels following dry needling last visit. Treatment continued and progressed by incorporating more functional LE strengthening exercises, with the addition of step up and lateral stepping. Pt tolerated exercise progressions well with no increase in pain and appropriate increase in muscular fatigue. Will continue to progress per pt's tolerance.    SUKI Is progressing well towards her goals.   Pt prognosis is Good.     Pt will continue to benefit from skilled outpatient physical therapy to address the deficits listed in the problem list box on initial evaluation, provide pt/family education and to maximize pt's level of independence in the home and community environment.     Pt's spiritual, cultural and  educational needs considered and pt agreeable to plan of care and goals.     Anticipated barriers to physical therapy: chronic nature and bilateral     Goals: :  STG 3 weeks  1.  Pt to be independent with HEP for increased functional mobility and pain control. MET  2.  Pt to increase AROM at 55 degs B lumbar rotation for increased functional mobility and transfers.  3.  Pt to decrease pain to less than 2/10 after session for increased functional mobility. MET     Long Term Goals: 8 weeks   1.  Pt to be independent with pain management strategies and verbalize 2 strategies for increased functional mobility and pain control. MET  2.  Pt to increase AROM at 60 degs B lumbar rotation for increased functional mobility and transfers.  3.  Pt to decrease pain to less than 0/10 after session for increased functional mobility. MET  4.  Pt to increase exercise tolerance to 45 min in session for increased functional activity overall. MET  5.  Pt to increase B hip extension and abduction to 4+/5 for increased functional mobility.     Plan      Plan of care Certification: 8/5/2024 to 10/31/24.     Outpatient Physical Therapy 2 times weekly for 10 weeks to include the following interventions: Cervical/Lumbar Traction, Electrical Stimulation DN, Gait Training, Manual Therapy, Moist Heat/ Ice, Neuromuscular Re-ed, Patient Education, Therapeutic Activities, and Therapeutic Exercise.     Janusz Reddy, PTA

## 2024-10-03 ENCOUNTER — CLINICAL SUPPORT (OUTPATIENT)
Dept: REHABILITATION | Facility: HOSPITAL | Age: 74
End: 2024-10-03
Payer: MEDICARE

## 2024-10-03 DIAGNOSIS — R29.898 WEAKNESS OF BOTH LOWER EXTREMITIES: ICD-10-CM

## 2024-10-03 DIAGNOSIS — R26.89 ANTALGIC GAIT: Primary | ICD-10-CM

## 2024-10-03 PROCEDURE — 97530 THERAPEUTIC ACTIVITIES: CPT | Mod: PO,CQ

## 2024-10-03 PROCEDURE — 97112 NEUROMUSCULAR REEDUCATION: CPT | Mod: PO,CQ

## 2024-10-03 NOTE — PROGRESS NOTES
"Physical Therapy Daily Treatment Note     Name: Eulalia Villarreal  Clinic Number: [id number]    Therapy Diagnosis:   Encounter Diagnoses   Name Primary?    Antalgic gait Yes    Weakness of both lower extremities        Physician: Sunny Gee MD    Visit Date: 10/3/2024    Physician Orders: PT Eval and Treat   Medical Diagnosis from Referral: M70.61,M70.62 (ICD-10-CM) - Trochanteric bursitis of both hips  Evaluation Date: 8/5/2024  Authorization Period Expiration: 12/31/24  Plan of Care Expiration: 10/31/24  Progress Note Due: 9/5/24  Date of Surgery: NA  Visit # / Visits authorized: 15/ 20  FOTO: 1/ 3  Foto 2/3 complete on visit 4  Foto 3/3 visit 10 achieved goal with 61%    Time In: 10:00 am  Time Out: 10:55 am  Total Billable Time: 27 minutes 1:1    Precautions: Standard    Subjective     Pt reports: no hip pain currently and continues to tolerate increased workload around the house without adverse effects.    She was compliant with home exercise program.  Response to previous treatment: no adverse effects  Functional change: walking better     Pain: 0/10 across lower back and 0/10 lateral hips pre-session  and 0/10 post at both back and hip  Location:  bilateral and L>R hip and buttocks      Objective     Objective measures displayed on progress notes unless otherwise noted       Treatment      EULALIA received the following manual therapy techniques:  Joint mobilizations, Manual traction, Myofacial release, and Soft tissue Mobilization were applied to the: iliacus, gluteus medius, TFL IT band for 00 minutes, including:    Long and short axis traction  STM MFR to iliacus and psoas, QL and gluteus medius and TFL      EULALIA participated in neuromuscular re-education activities to improve: Balance, Coordination, Proprioception and Posture for 32 minutes. The following activities were included:    Nu Step level 1 x 8 minutes    Seated lumbar flexion ball rollouts x 15 3" hold    PPT x 30  Small amplitude trunk " rotation x 30     Bridges with add ball 2 x 10  Supine clams GTB 2 x 10  Bridges with abd GTB 2 x 10  SLR #2 2 x 10 ea    SL hip abd 2 x 10 ea  Prone hip extensions 2 x 10 ea    Therapeutic activities: x 23 min     Sit to stand 2 x 10  Leg press 20# 2 x 10  Standing alternating marching 2 x 10 ea  Standing hip ext 2 x 10 ea  Standing lateral toe taps 2 x 10 ea  Supine to sit to stand with cues for body mechanics 5x  Stand step with cues for body mechanics  Step ups 6 inch step x 10 ea  Lateral steps RTB x 2 laps of 10 steps ea    Therapeutic exercise: x 10 min  Recumbent bike x 8 minutes  Hip add/frog stretch 3 x 30 secs  Trunk rotation stretch 3 x 30 secs  SKTC 3 x 30 secs  Davon test position hip flexor stretch 2 x 60 secs each side  Seated HS stretch 3 x 30 secs    Home Exercises Provided and Patient Education Provided     Education provided:   - HEP review    Written Home Exercises Provided: Patient instructed to cont prior HEP.  Exercises were reviewed and SUKI was able to demonstrate them prior to the end of the session.  SUKI demonstrated good  understanding of the education provided.     See EMR under Patient Instructions for exercises provided prior visit.    Assessment   Continuation and progression of function LE strengthening and mobility exercises were tolerated well without adverse effects. Pt continues to increase work load at home with yard work and notes improved tolerance, requiring less frequent breaks to complete tasks. Will continue to progress per pt's tolerance.    SUKI Is progressing well towards her goals.   Pt prognosis is Good.     Pt will continue to benefit from skilled outpatient physical therapy to address the deficits listed in the problem list box on initial evaluation, provide pt/family education and to maximize pt's level of independence in the home and community environment.     Pt's spiritual, cultural and educational needs considered and pt agreeable to plan of care  and goals.     Anticipated barriers to physical therapy: chronic nature and bilateral     Goals: :  STG 3 weeks  1.  Pt to be independent with HEP for increased functional mobility and pain control. MET  2.  Pt to increase AROM at 55 degs B lumbar rotation for increased functional mobility and transfers.  3.  Pt to decrease pain to less than 2/10 after session for increased functional mobility. MET     Long Term Goals: 8 weeks   1.  Pt to be independent with pain management strategies and verbalize 2 strategies for increased functional mobility and pain control. MET  2.  Pt to increase AROM at 60 degs B lumbar rotation for increased functional mobility and transfers.  3.  Pt to decrease pain to less than 0/10 after session for increased functional mobility. MET  4.  Pt to increase exercise tolerance to 45 min in session for increased functional activity overall. MET  5.  Pt to increase B hip extension and abduction to 4+/5 for increased functional mobility.     Plan      Plan of care Certification: 8/5/2024 to 10/31/24.     Outpatient Physical Therapy 2 times weekly for 10 weeks to include the following interventions: Cervical/Lumbar Traction, Electrical Stimulation DN, Gait Training, Manual Therapy, Moist Heat/ Ice, Neuromuscular Re-ed, Patient Education, Therapeutic Activities, and Therapeutic Exercise.     Janusz Reddy, PTA

## 2024-10-08 ENCOUNTER — CLINICAL SUPPORT (OUTPATIENT)
Dept: REHABILITATION | Facility: HOSPITAL | Age: 74
End: 2024-10-08
Payer: MEDICARE

## 2024-10-08 DIAGNOSIS — R29.898 WEAKNESS OF BOTH LOWER EXTREMITIES: ICD-10-CM

## 2024-10-08 DIAGNOSIS — R26.89 ANTALGIC GAIT: Primary | ICD-10-CM

## 2024-10-08 PROCEDURE — 97530 THERAPEUTIC ACTIVITIES: CPT | Mod: KX,PO

## 2024-10-08 PROCEDURE — 97112 NEUROMUSCULAR REEDUCATION: CPT | Mod: KX,PO

## 2024-10-08 NOTE — PROGRESS NOTES
"Physical Therapy Daily Treatment Note     Name: Eulalia Villarreal  Clinic Number: [id number]    Therapy Diagnosis:   Encounter Diagnoses   Name Primary?    Antalgic gait Yes    Weakness of both lower extremities        Physician: Sunny Gee MD    Visit Date: 10/8/2024    Physician Orders: PT Eval and Treat   Medical Diagnosis from Referral: M70.61,M70.62 (ICD-10-CM) - Trochanteric bursitis of both hips  Evaluation Date: 8/5/2024  Authorization Period Expiration: 12/31/24  Plan of Care Expiration: 10/31/24  Progress Note Due: 9/5/24  Date of Surgery: NA  Visit # / Visits authorized: 15/ 20  FOTO: 1/ 3  Foto 2/3 complete on visit 4  Foto 3/3 visit 10 achieved goal with 61%    Time In: 10:02 am  Time Out: 10:57 am  Total Billable Time: 9425-6779 minutes 1:1 39 minutes  TA 1 NMR 2    Precautions: Standard    Subjective     Pt reports: no hip pain currently and continues to tolerate increased workload around the house without adverse effects.    She was compliant with home exercise program.  Response to previous treatment: no adverse effects  Functional change: walking better     Pain: 0/10 across lower back and 0/10 lateral hips pre-session  and 0/10 post at both back and hip  Location:  bilateral and L>R hip and buttocks      Objective     Objective measures displayed on progress notes unless otherwise noted       Treatment      EULALIA received the following manual therapy techniques:  Joint mobilizations, Manual traction, Myofacial release, and Soft tissue Mobilization were applied to the: iliacus, gluteus medius, TFL IT band for 02 minutes, including:    Long and short axis traction  STM MFR to iliacus and psoas, QL and gluteus medius and TFL      EULALIA participated in neuromuscular re-education activities to improve: Balance, Coordination, Proprioception and Posture for 25 minutes. The following activities were included:    Nu Step level 1 x 8 minutes    Seated lumbar flexion ball rollouts x 15 3" hold    PPT " x 30  Small amplitude trunk rotation x 30     Bridges with add ball 2 x 10  Supine clams GTB 2 x 10  Bridges with abd GTB 2 x 10  SLR #2 2 x 10 ea    SL hip abd 2 x 10 ea  Prone hip extensions 2 x 10 ea    Therapeutic activities: x 20 min     Sit to stand 2 x 10  Leg press 20# 2 x 10  Standing alternating marching 2 x 10 ea  Standing hip ext 2 x 10 ea  Standing lateral toe taps 2 x 10 ea  Supine to sit to stand with cues for body mechanics 5x  Stand step with cues for body mechanics  Step ups 6 inch step x 10 ea  Lateral steps RTB x 2 laps of 10 steps ea    Therapeutic exercise: x 8 min  Recumbent bike x 8 minutes  Hip add/frog stretch 3 x 30 secs  Trunk rotation stretch 3 x 30 secs  SKTC 3 x 30 secs  Davon test position hip flexor stretch 2 x 60 secs each side  Seated HS stretch 3 x 30 secs    Home Exercises Provided and Patient Education Provided     Education provided:   - HEP review    Written Home Exercises Provided: Patient instructed to cont prior HEP.  Exercises were reviewed and SUKI was able to demonstrate them prior to the end of the session.  SUKI demonstrated good  understanding of the education provided.     See EMR under Patient Instructions for exercises provided prior visit.    Assessment   Pt without issues and continued progression with standing exercise without deficits and with increased exercise tolerance with minimal guarding overall with progressive release with traction overall. Pt with pain well controlled for weeks.     SUKI Is progressing well towards her goals.   Pt prognosis is Good.     Pt will continue to benefit from skilled outpatient physical therapy to address the deficits listed in the problem list box on initial evaluation, provide pt/family education and to maximize pt's level of independence in the home and community environment.     Pt's spiritual, cultural and educational needs considered and pt agreeable to plan of care and goals.     Anticipated barriers to  physical therapy: chronic nature and bilateral     Goals: :  STG 3 weeks  1.  Pt to be independent with HEP for increased functional mobility and pain control. MET  2.  Pt to increase AROM at 55 degs B lumbar rotation for increased functional mobility and transfers.  3.  Pt to decrease pain to less than 2/10 after session for increased functional mobility. MET     Long Term Goals: 8 weeks   1.  Pt to be independent with pain management strategies and verbalize 2 strategies for increased functional mobility and pain control. MET  2.  Pt to increase AROM at 60 degs B lumbar rotation for increased functional mobility and transfers.  3.  Pt to decrease pain to less than 0/10 after session for increased functional mobility. MET  4.  Pt to increase exercise tolerance to 45 min in session for increased functional activity overall. MET  5.  Pt to increase B hip extension and abduction to 4+/5 for increased functional mobility.     Plan      Plan of care Certification: 8/5/2024 to 10/31/24.     Outpatient Physical Therapy 2 times weekly for 10 weeks to include the following interventions: Cervical/Lumbar Traction, Electrical Stimulation DN, Gait Training, Manual Therapy, Moist Heat/ Ice, Neuromuscular Re-ed, Patient Education, Therapeutic Activities, and Therapeutic Exercise.     Meera Story, PT

## 2024-10-17 ENCOUNTER — CLINICAL SUPPORT (OUTPATIENT)
Dept: REHABILITATION | Facility: HOSPITAL | Age: 74
End: 2024-10-17
Payer: MEDICARE

## 2024-10-17 ENCOUNTER — LAB VISIT (OUTPATIENT)
Dept: LAB | Facility: HOSPITAL | Age: 74
End: 2024-10-17
Attending: FAMILY MEDICINE
Payer: MEDICARE

## 2024-10-17 DIAGNOSIS — R29.898 WEAKNESS OF BOTH LOWER EXTREMITIES: ICD-10-CM

## 2024-10-17 DIAGNOSIS — R26.89 ANTALGIC GAIT: Primary | ICD-10-CM

## 2024-10-17 DIAGNOSIS — E11.9 CONTROLLED TYPE 2 DIABETES MELLITUS WITHOUT COMPLICATION, WITHOUT LONG-TERM CURRENT USE OF INSULIN: ICD-10-CM

## 2024-10-17 LAB
ALBUMIN SERPL BCP-MCNC: 4.3 G/DL (ref 3.5–5.2)
ALP SERPL-CCNC: 80 U/L (ref 40–150)
ALT SERPL W/O P-5'-P-CCNC: 19 U/L (ref 10–44)
ANION GAP SERPL CALC-SCNC: 6 MMOL/L (ref 8–16)
AST SERPL-CCNC: 19 U/L (ref 10–40)
BILIRUB SERPL-MCNC: 1 MG/DL (ref 0.1–1)
BUN SERPL-MCNC: 16 MG/DL (ref 8–23)
CALCIUM SERPL-MCNC: 9.7 MG/DL (ref 8.7–10.5)
CHLORIDE SERPL-SCNC: 108 MMOL/L (ref 95–110)
CO2 SERPL-SCNC: 28 MMOL/L (ref 23–29)
CREAT SERPL-MCNC: 0.8 MG/DL (ref 0.5–1.4)
EST. GFR  (NO RACE VARIABLE): >60 ML/MIN/1.73 M^2
ESTIMATED AVG GLUCOSE: 143 MG/DL (ref 68–131)
GLUCOSE SERPL-MCNC: 142 MG/DL (ref 70–110)
HBA1C MFR BLD: 6.6 % (ref 4–5.6)
POTASSIUM SERPL-SCNC: 4.6 MMOL/L (ref 3.5–5.1)
PROT SERPL-MCNC: 7.7 G/DL (ref 6–8.4)
SODIUM SERPL-SCNC: 142 MMOL/L (ref 136–145)

## 2024-10-17 PROCEDURE — 97530 THERAPEUTIC ACTIVITIES: CPT | Mod: PO

## 2024-10-17 PROCEDURE — 83036 HEMOGLOBIN GLYCOSYLATED A1C: CPT | Performed by: FAMILY MEDICINE

## 2024-10-17 PROCEDURE — 80053 COMPREHEN METABOLIC PANEL: CPT | Performed by: FAMILY MEDICINE

## 2024-10-17 PROCEDURE — 97112 NEUROMUSCULAR REEDUCATION: CPT | Mod: PO

## 2024-10-17 PROCEDURE — 36415 COLL VENOUS BLD VENIPUNCTURE: CPT | Mod: PO | Performed by: FAMILY MEDICINE

## 2024-10-19 NOTE — PROGRESS NOTES
Physical Therapy Daily Treatment Note     Name: Eulalia Villarreal  Clinic Number: [id number]    Therapy Diagnosis:   Encounter Diagnoses   Name Primary?    Antalgic gait Yes    Weakness of both lower extremities        Physician: Sunny Gee MD    Visit Date: 10/17/2024    Physician Orders: PT Eval and Treat   Medical Diagnosis from Referral: M70.61,M70.62 (ICD-10-CM) - Trochanteric bursitis of both hips  Evaluation Date: 8/5/2024  Authorization Period Expiration: 12/31/24  Plan of Care Expiration: 10/31/24  Progress Note Due: 9/5/24  Date of Surgery: NA  Visit # / Visits authorized: 15/ 20  FOTO: 1/ 3  Foto 2/3 complete on visit 4  Foto 3/3 visit 10 achieved goal with 61%    Time In: 800 am  Time Out: 900 am  Total Billable Time: 60 minutes 1:1 802-840 38 minutes  TA 2 NMR 1    Precautions: Standard    Subjective     Pt reports: no hip pain currently and continues to tolerate increased workload around the house without adverse effects still and feeling pretty good. I will be out of town for a while.    She was compliant with home exercise program.  Response to previous treatment: no adverse effects  Functional change: walking better     Pain: 0/10 across lower back and 0/10 lateral hips pre-session  and 0/10 post at both back and hip  Location:  bilateral and L>R hip and buttocks      Objective     Objective measures displayed on progress notes unless otherwise noted       Treatment      EULALIA received the following manual therapy techniques:  Joint mobilizations, Manual traction, Myofacial release, and Soft tissue Mobilization were applied to the: iliacus, gluteus medius, TFL IT band for 05 minutes, including:    Long and short axis traction  STM MFR to iliacus and psoas, QL and gluteus medius and TFL      EULALIA participated in neuromuscular re-education activities to improve: Balance, Coordination, Proprioception and Posture for 22 minutes. The following activities were included:    Nu Step level 1 x 5  "minutes    Seated lumbar flexion ball rollouts x 15 3" hold    PPT x 30  Small amplitude trunk rotation x 30     Bridges with add ball 2 x 10  Supine clams GTB 2 x 10  Bridges with abd GTB 2 x 10  SLR #2 2 x 10 ea    SL hip abd 2 x 10 ea  Prone hip extensions 2 x 10 ea    Therapeutic activities: x 25 min     Sit to stand 2 x 10  Leg press 20# 2 x 10  Standing alternating marching 2 x 10 ea with BTB  Standing hip ext 2 x 10 ea with BTB  Standing lateral toe taps 2 x 10 ea with BTB  Supine to sit to stand with cues for body mechanics 2x10 with 10# ankle weight around wrist  Stand step with cues for body mechanics  Step ups 6 inch step x 10 ea  Lateral steps RTB x 2 laps of 10 steps ea    Therapeutic exercise: x 8 min  Recumbent bike x 8 minutes  Hip add/frog stretch 3 x 30 secs  Trunk rotation stretch 3 x 30 secs  SKTC 3 x 30 secs  Davon test position hip flexor stretch 2 x 60 secs each side  Seated HS stretch 3 x 30 secs    Home Exercises Provided and Patient Education Provided     Education provided:   - HEP review    Written Home Exercises Provided: Patient instructed to cont prior HEP.  Exercises were reviewed and SUKI was able to demonstrate them prior to the end of the session.  SUKI demonstrated good  understanding of the education provided.     See EMR under Patient Instructions for exercises provided prior visit.    Assessment   Pt without issues and continued progression with standing exercise without deficits and with increased exercise tolerance with minimal guarding overall with progressive release with traction overall. Pt with pain well controlled for weeks and more than half of session with standing exercise for increased functional tolerance.     SUKI Is progressing well towards her goals.   Pt prognosis is Good.     Pt will continue to benefit from skilled outpatient physical therapy to address the deficits listed in the problem list box on initial evaluation, provide pt/family education " and to maximize pt's level of independence in the home and community environment.     Pt's spiritual, cultural and educational needs considered and pt agreeable to plan of care and goals.     Anticipated barriers to physical therapy: chronic nature and bilateral     Goals: :  STG 3 weeks  1.  Pt to be independent with HEP for increased functional mobility and pain control. MET  2.  Pt to increase AROM at 55 degs B lumbar rotation for increased functional mobility and transfers.  3.  Pt to decrease pain to less than 2/10 after session for increased functional mobility. MET     Long Term Goals: 8 weeks   1.  Pt to be independent with pain management strategies and verbalize 2 strategies for increased functional mobility and pain control. MET  2.  Pt to increase AROM at 60 degs B lumbar rotation for increased functional mobility and transfers.  3.  Pt to decrease pain to less than 0/10 after session for increased functional mobility. MET  4.  Pt to increase exercise tolerance to 45 min in session for increased functional activity overall. MET  5.  Pt to increase B hip extension and abduction to 4+/5 for increased functional mobility.     Plan      Plan of care Certification: 8/5/2024 to 10/31/24.     Outpatient Physical Therapy 2 times weekly for 10 weeks to include the following interventions: Cervical/Lumbar Traction, Electrical Stimulation DN, Gait Training, Manual Therapy, Moist Heat/ Ice, Neuromuscular Re-ed, Patient Education, Therapeutic Activities, and Therapeutic Exercise.     Meera Story, PT

## 2024-10-22 ENCOUNTER — CLINICAL SUPPORT (OUTPATIENT)
Dept: REHABILITATION | Facility: HOSPITAL | Age: 74
End: 2024-10-22
Payer: MEDICARE

## 2024-10-22 DIAGNOSIS — R26.89 ANTALGIC GAIT: Primary | ICD-10-CM

## 2024-10-22 DIAGNOSIS — R29.898 WEAKNESS OF BOTH LOWER EXTREMITIES: ICD-10-CM

## 2024-10-22 PROCEDURE — 97530 THERAPEUTIC ACTIVITIES: CPT | Mod: PO

## 2024-10-22 PROCEDURE — 97112 NEUROMUSCULAR REEDUCATION: CPT | Mod: PO

## 2024-10-23 NOTE — PROGRESS NOTES
Physical Therapy Daily Treatment Note     Name: Eulalia Villarreal  Clinic Number: [id number]    Therapy Diagnosis:   Encounter Diagnoses   Name Primary?    Antalgic gait Yes    Weakness of both lower extremities        Physician: Sunny Gee MD    Visit Date: 10/22/2024    Physician Orders: PT Eval and Treat   Medical Diagnosis from Referral: M70.61,M70.62 (ICD-10-CM) - Trochanteric bursitis of both hips  Evaluation Date: 8/5/2024  Authorization Period Expiration: 12/31/24  Plan of Care Expiration: 10/31/24  Progress Note Due: 9/5/24  Date of Surgery: NA  Visit # / Visits authorized: 15/ 20  FOTO: 1/ 3  Foto 2/3 complete on visit 4  Foto 3/3 visit 10 achieved goal with 61%    Time In: 900 am  Time Out: 1000 am  Total Billable Time: 60 minutes 1:1 900-955 55 minutes  TA 2 NMR 2    Precautions: Standard    Subjective     Pt reports: no hip pain currently and continues to tolerate increased workload around the house without adverse effects still and feeling pretty good except shoulders and neck with house work.    She was compliant with home exercise program.  Response to previous treatment: no adverse effects  Functional change: walking better     Pain: 0/10 across lower back and 0/10 lateral hips pre-session  and 0/10 post at both back and hip  Location:  bilateral and L>R hip and buttocks      Objective     Objective measures displayed on progress notes unless otherwise noted       Treatment      EULALIA received the following manual therapy techniques:  Joint mobilizations, Manual traction, Myofacial release, and Soft tissue Mobilization were applied to the: iliacus, gluteus medius, TFL IT band for 05 minutes, including:    Long and short axis traction  STM MFR to iliacus and psoas, QL and gluteus medius and TFL      EULALIA participated in neuromuscular re-education activities to improve: Balance, Coordination, Proprioception and Posture for 27 minutes. The following activities were included:    Nu Step level 1  "x 8minutes    Seated lumbar flexion ball rollouts x 15 3" hold    PPT x 30  Small amplitude trunk rotation x 30     Bridges with add ball 2 x 10  Supine clams GTB 2 x 10  Bridges with abd GTB 2 x 10  SLR #2 2 x 10 ea    SL hip abd 2 x 10 ea  Prone hip extensions 2 x 10 ea    Therapeutic activities: x 25 min     Sit to stand 2 x 10  Leg press 20# 2 x 10  Standing alternating marching 2 x 10 ea with BTB  Standing hip ext 2 x 10 ea with BTB  Standing lateral toe taps 2 x 10 ea with BTB  Supine to sit to stand with cues for body mechanics 2x10 with 10# ankle weight around wrist  Palloff press 2 x 10 red Tband semisquat  Stand step with cues for body mechanics  Step ups 6 inch step x 10 ea  Lateral steps RTB x 2 laps of 10 steps ea    Therapeutic exercise: x 8 min  Recumbent bike x 8 minutes  Hip add/frog stretch 3 x 30 secs  Trunk rotation stretch 3 x 30 secs  SKTC 3 x 30 secs  Davon test position hip flexor stretch 2 x 60 secs each side  Seated HS stretch 3 x 30 secs    Home Exercises Provided and Patient Education Provided     Education provided:   - HEP review    Written Home Exercises Provided: Patient instructed to cont prior HEP.  Exercises were reviewed and SUKI was able to demonstrate them prior to the end of the session.  SUKI demonstrated good  understanding of the education provided.     See EMR under Patient Instructions for exercises provided prior visit.    Assessment   Pt without issues and continued progression with standing exercise without issues and good overall tolerance with therapy progressing towards discharge as she has been pain free and progressively increased her work without issues this last few weeks and continued overall tolerance and strength.      SUKI Is progressing well towards her goals.   Pt prognosis is Good.     Pt will continue to benefit from skilled outpatient physical therapy to address the deficits listed in the problem list box on initial evaluation, provide " pt/family education and to maximize pt's level of independence in the home and community environment.     Pt's spiritual, cultural and educational needs considered and pt agreeable to plan of care and goals.     Anticipated barriers to physical therapy: chronic nature and bilateral     Goals: :  STG 3 weeks  1.  Pt to be independent with HEP for increased functional mobility and pain control. MET  2.  Pt to increase AROM at 55 degs B lumbar rotation for increased functional mobility and transfers.  3.  Pt to decrease pain to less than 2/10 after session for increased functional mobility. MET     Long Term Goals: 8 weeks   1.  Pt to be independent with pain management strategies and verbalize 2 strategies for increased functional mobility and pain control. MET  2.  Pt to increase AROM at 60 degs B lumbar rotation for increased functional mobility and transfers.  3.  Pt to decrease pain to less than 0/10 after session for increased functional mobility. MET  4.  Pt to increase exercise tolerance to 45 min in session for increased functional activity overall. MET  5.  Pt to increase B hip extension and abduction to 4+/5 for increased functional mobility.     Plan      Plan of care Certification: 8/5/2024 to 10/31/24.     Outpatient Physical Therapy 2 times weekly for 10 weeks to include the following interventions: Cervical/Lumbar Traction, Electrical Stimulation DN, Gait Training, Manual Therapy, Moist Heat/ Ice, Neuromuscular Re-ed, Patient Education, Therapeutic Activities, and Therapeutic Exercise.     Meera Story, PT

## 2024-10-24 ENCOUNTER — OFFICE VISIT (OUTPATIENT)
Dept: FAMILY MEDICINE | Facility: CLINIC | Age: 74
End: 2024-10-24
Payer: MEDICARE

## 2024-10-24 VITALS
WEIGHT: 141.75 LBS | BODY MASS INDEX: 24.2 KG/M2 | DIASTOLIC BLOOD PRESSURE: 66 MMHG | OXYGEN SATURATION: 97 % | HEART RATE: 78 BPM | HEIGHT: 64 IN | SYSTOLIC BLOOD PRESSURE: 118 MMHG

## 2024-10-24 DIAGNOSIS — E78.5 HYPERLIPIDEMIA LDL GOAL <100: ICD-10-CM

## 2024-10-24 DIAGNOSIS — G47.30 SLEEP APNEA, UNSPECIFIED TYPE: ICD-10-CM

## 2024-10-24 DIAGNOSIS — K21.00 GASTROESOPHAGEAL REFLUX DISEASE WITH ESOPHAGITIS WITHOUT HEMORRHAGE: ICD-10-CM

## 2024-10-24 DIAGNOSIS — E11.9 CONTROLLED TYPE 2 DIABETES MELLITUS WITHOUT COMPLICATION, WITHOUT LONG-TERM CURRENT USE OF INSULIN: Primary | ICD-10-CM

## 2024-10-24 PROCEDURE — 99214 OFFICE O/P EST MOD 30 MIN: CPT | Mod: S$GLB,,, | Performed by: FAMILY MEDICINE

## 2024-10-24 PROCEDURE — 3061F NEG MICROALBUMINURIA REV: CPT | Mod: CPTII,S$GLB,, | Performed by: FAMILY MEDICINE

## 2024-10-24 PROCEDURE — 3066F NEPHROPATHY DOC TX: CPT | Mod: CPTII,S$GLB,, | Performed by: FAMILY MEDICINE

## 2024-10-24 PROCEDURE — 1101F PT FALLS ASSESS-DOCD LE1/YR: CPT | Mod: CPTII,S$GLB,, | Performed by: FAMILY MEDICINE

## 2024-10-24 PROCEDURE — 3074F SYST BP LT 130 MM HG: CPT | Mod: CPTII,S$GLB,, | Performed by: FAMILY MEDICINE

## 2024-10-24 PROCEDURE — 99999 PR PBB SHADOW E&M-EST. PATIENT-LVL III: CPT | Mod: PBBFAC,,, | Performed by: FAMILY MEDICINE

## 2024-10-24 PROCEDURE — G2211 COMPLEX E/M VISIT ADD ON: HCPCS | Mod: S$GLB,,, | Performed by: FAMILY MEDICINE

## 2024-10-24 PROCEDURE — 1160F RVW MEDS BY RX/DR IN RCRD: CPT | Mod: CPTII,S$GLB,, | Performed by: FAMILY MEDICINE

## 2024-10-24 PROCEDURE — 3078F DIAST BP <80 MM HG: CPT | Mod: CPTII,S$GLB,, | Performed by: FAMILY MEDICINE

## 2024-10-24 PROCEDURE — 3008F BODY MASS INDEX DOCD: CPT | Mod: CPTII,S$GLB,, | Performed by: FAMILY MEDICINE

## 2024-10-24 PROCEDURE — 3044F HG A1C LEVEL LT 7.0%: CPT | Mod: CPTII,S$GLB,, | Performed by: FAMILY MEDICINE

## 2024-10-24 PROCEDURE — 3288F FALL RISK ASSESSMENT DOCD: CPT | Mod: CPTII,S$GLB,, | Performed by: FAMILY MEDICINE

## 2024-10-24 PROCEDURE — 1126F AMNT PAIN NOTED NONE PRSNT: CPT | Mod: CPTII,S$GLB,, | Performed by: FAMILY MEDICINE

## 2024-10-24 PROCEDURE — 1159F MED LIST DOCD IN RCRD: CPT | Mod: CPTII,S$GLB,, | Performed by: FAMILY MEDICINE

## 2024-10-24 NOTE — PROGRESS NOTES
Subjective:       Patient ID: Eulalia Villarreal is a 74 y.o. female.    Chief Complaint: Diabetes (3 month medication follow up )      Patient presents with:  Diabetes: 3 month follow up    Following with ortho for bilateral hip bursitis. She is getting injections periodically.  Pain is worse on the left and radiates to lateral knee.  PT was helpful.  Tizanidine has been helpful.    DM2 - taking metformin 500mg BID, jardiance 25mg  HLD - taking Crestor 10mg daily with CoQ 10  Daily HA - taking tylenol PRN; using voltaren topical at bedtime  GERD - controlled protonix  Sleep apnea - wearing CPAP nightly; awakening in the middle of the night at times  Heand arthritis - some chronic thumb pain with activity  saw podiatry and was diagnosed with neuropathy. Taking B12, D and ALA          Diabetes  Hypoglycemia symptoms include headaches (posterior neck with radiation into the scalp.). Pertinent negatives for hypoglycemia include no dizziness. Pertinent negatives for diabetes include no chest pain and no weakness.   Follow-up  Associated symptoms include arthralgias (hands), headaches (posterior neck with radiation into the scalp.) and numbness. Pertinent negatives include no abdominal pain, chest pain, chills, coughing, fever, nausea, neck pain, rash, sore throat, vomiting or weakness.   Headache   Associated symptoms include numbness. Pertinent negatives include no abdominal pain, coughing, dizziness, eye pain, fever, nausea, neck pain, sore throat, vomiting or weakness.   Hip Pain   Associated symptoms include numbness.       Past Medical History:   Diagnosis Date    Arthritis     hands    Diabetes mellitus     Herniated disc     L5    Hyperlipidemia     Sleep apnea, unspecified        Past Surgical History:   Procedure Laterality Date    ADENOIDECTOMY      BACK SURGERY  1982    discectomy    FLEXIBLE SIGMOIDOSCOPY  8/7/1990  [facility]    TONSILLECTOMY      WISDOM TOOTH EXTRACTION         Review of patient's allergies  indicates:   Allergen Reactions    Statins-hmg-coa reductase inhibitors Other (See Comments)     Severe pain to her left side    Meperidine      Other reaction(s): hyper    Sulfa (sulfonamide antibiotics) Rash     Other reaction(s): Itching       Social History     Socioeconomic History    Marital status:    Occupational History    Occupation: retired    Tobacco Use    Smoking status: Never    Smokeless tobacco: Never   Substance and Sexual Activity    Alcohol use: No     Comment: rare    Drug use: No    Sexual activity: Never     Social Drivers of Health     Financial Resource Strain: Low Risk  (1/11/2021)    Overall Financial Resource Strain (CARDIA)     Difficulty of Paying Living Expenses: Not very hard   Food Insecurity: No Food Insecurity (1/11/2021)    Hunger Vital Sign     Worried About Running Out of Food in the Last Year: Never true     Ran Out of Food in the Last Year: Never true   Transportation Needs: No Transportation Needs (1/11/2021)    PRAPARE - Transportation     Lack of Transportation (Medical): No     Lack of Transportation (Non-Medical): No   Physical Activity: Inactive (1/11/2021)    Exercise Vital Sign     Days of Exercise per Week: 0 days     Minutes of Exercise per Session: 0 min   Stress: Stress Concern Present (1/11/2021)    [employer] - Occupational Stress Questionnaire     Feeling of Stress : To some extent       Current Outpatient Medications on File Prior to Visit   Medication Sig Dispense Refill    ascorbic acid, vitamin C, (VITAMIN C) 500 MG tablet Take 1 tablet by mouth Daily.      diclofenac sodium (VOLTAREN) 1 % Gel Apply 2 g topically 4 (four) times daily. To hands as needed 100 g 1    empagliflozin (JARDIANCE) 25 mg tablet Take 1 tablet (25 mg total) by mouth once daily. 90 tablet 3    fluocinonide 0.05% (LIDEX) 0.05 % cream Apply topically 2 (two) times daily. 60 g 0    glucosamine-D3-Boswellia serr 1,500-400-100 mg-unit-mg Tab  Take by mouth.      metFORMIN (GLUCOPHAGE) 500 MG tablet Take 1 tablet (500 mg total) by mouth 2 (two) times daily with meals. 180 tablet 3    pantoprazole (PROTONIX) 40 MG tablet TAKE 1 TABLET BY MOUTH EVERY DAY 90 tablet 3    rosuvastatin (CRESTOR) 10 MG tablet Take 1 tablet (10 mg total) by mouth once daily. 90 tablet 3    tiZANidine (ZANAFLEX) 2 MG tablet Take 2 tablets (4 mg total) by mouth nightly as needed (headache). 30 tablet 5    UBIDECARENONE (CO Q-10 ORAL) Take by mouth.      vitamin D 185 MG Tab Take 2,000 mg by mouth once daily.      VITAMIN E ACETATE (VITAMIN E ORAL) Take 1 capsule by mouth Daily.      FLUAD QUAD 2021-22,65Y UP,,PF, 60 mcg (15 mcg x 4)/0.5 mL Syrg  (Patient not taking: Reported on 10/24/2024)      FLUAD QUAD 2022-23,65Y UP,,PF, 60 mcg (15 mcg x 4)/0.5 mL Syrg  (Patient not taking: Reported on 10/24/2024)      fluocinonide (LIDEX) 0.05 % external solution Apply topically 2 (two) times daily. (Patient not taking: Reported on 10/24/2024) 60 mL 2    ketoconazole (NIZORAL) 2 % shampoo Apply topically twice a week. (Patient not taking: Reported on 10/24/2024) 120 mL 4     No current facility-administered medications on file prior to visit.       Family History   Problem Relation Name Age of Onset    Diabetes Mother      Coronary artery disease Mother      Diabetes Son      Cancer Maternal Aunt      Cerebral aneurysm Father      Coronary artery disease Sister      Coronary artery disease Brother      Heart disease Neg Hx      Glaucoma Neg Hx      Retinal detachment Neg Hx      Macular degeneration Neg Hx      Melanoma Neg Hx         Review of Systems   Constitutional:  Negative for appetite change, chills, fever and unexpected weight change.   HENT:  Negative for sore throat and trouble swallowing.    Eyes:  Negative for pain and visual disturbance.   Respiratory:  Negative for cough, shortness of breath and wheezing.    Cardiovascular:  Negative for chest pain and palpitations.  "  Gastrointestinal:  Negative for abdominal pain, blood in stool, diarrhea, nausea and vomiting.   Genitourinary:  Negative for difficulty urinating, dysuria and hematuria.   Musculoskeletal:  Positive for arthralgias (hands). Negative for gait problem and neck pain.   Skin:  Negative for rash and wound.   Neurological:  Positive for numbness and headaches (posterior neck with radiation into the scalp.). Negative for dizziness and weakness.   Hematological:  Negative for adenopathy.   Psychiatric/Behavioral:  Negative for dysphoric mood.        Objective:      /66   Pulse 78   Ht 5' 4" (1.626 m)   Wt 64.3 kg (141 lb 12.1 oz)   SpO2 97%   BMI 24.33 kg/m²   Physical Exam  Constitutional:       Appearance: Normal appearance. She is well-developed.   HENT:      Head: Normocephalic.      Mouth/Throat:      Pharynx: No oropharyngeal exudate or posterior oropharyngeal erythema.   Eyes:      Conjunctiva/sclera: Conjunctivae normal.      Pupils: Pupils are equal, round, and reactive to light.   Neck:      Thyroid: No thyromegaly.   Cardiovascular:      Rate and Rhythm: Normal rate and regular rhythm.      Heart sounds: Normal heart sounds, S1 normal and S2 normal. No murmur heard.     No friction rub. No gallop.   Pulmonary:      Effort: Pulmonary effort is normal.      Breath sounds: Normal breath sounds. No wheezing or rales.   Abdominal:      General: Bowel sounds are normal.      Palpations: Abdomen is soft.   Musculoskeletal:      Cervical back: Normal range of motion and neck supple.      Lumbar back: Normal.      Right hip: Tenderness present. Decreased range of motion.      Left hip: Tenderness present. Decreased range of motion.      Right lower leg: No edema.      Left lower leg: No edema.   Lymphadenopathy:      Cervical: No cervical adenopathy.   Skin:     General: Skin is warm.      Findings: No rash.   Neurological:      Mental Status: She is alert and oriented to person, place, and time.      " Cranial Nerves: No cranial nerve deficit.      Gait: Gait normal.       Foot exam: inspection normal, sensation intact; 2+ DP pulses bilaterally    Results for orders placed or performed in visit on 10/17/24   Hemoglobin A1C    Collection Time: 10/17/24  7:55 AM   Result Value Ref Range    Hemoglobin A1C 6.6 (H) 4.0 - 5.6 %    Estimated Avg Glucose 143 (H) 68 - 131 mg/dL   Comprehensive Metabolic Panel    Collection Time: 10/17/24  7:55 AM   Result Value Ref Range    Sodium 142 136 - 145 mmol/L    Potassium 4.6 3.5 - 5.1 mmol/L    Chloride 108 95 - 110 mmol/L    CO2 28 23 - 29 mmol/L    Glucose 142 (H) 70 - 110 mg/dL    BUN 16 8 - 23 mg/dL    Creatinine 0.8 0.5 - 1.4 mg/dL    Calcium 9.7 8.7 - 10.5 mg/dL    Total Protein 7.7 6.0 - 8.4 g/dL    Albumin 4.3 3.5 - 5.2 g/dL    Total Bilirubin 1.0 0.1 - 1.0 mg/dL    Alkaline Phosphatase 80 40 - 150 U/L    AST 19 10 - 40 U/L    ALT 19 10 - 44 U/L    eGFR >60.0 >60 mL/min/1.73 m^2    Anion Gap 6 (L) 8 - 16 mmol/L     Labs reviewed    Assessment:       1. Controlled type 2 diabetes mellitus without complication, without long-term current use of insulin    2. Hyperlipidemia LDL goal <100    3. Gastroesophageal reflux disease with esophagitis without hemorrhage    4. Sleep apnea, unspecified type                  Plan:       Controlled type 2 diabetes mellitus without complication, without long-term current use of insulin  -     Hemoglobin A1C; Future; Expected date: 04/22/2025  -     Comprehensive Metabolic Panel; Future; Expected date: 04/22/2025  -     Lipid Panel; Future; Expected date: 04/22/2025  -     Microalbumin/Creatinine Ratio, Urine; Future; Expected date: 04/22/2025    Hyperlipidemia LDL goal <100    Gastroesophageal reflux disease with esophagitis without hemorrhage    Sleep apnea, unspecified type            Ok to try melatonin  Continue Protonix   Continue Jardiance 25mg; continue Metformin  continue other present meds  Continue CPAP and benefiting from  use  F/u 6 months with labs  Counseled on regular exercise, maintenance of a healthy weight, balanced diet rich in fruits/vegetables and lean protein, and avoidance of unhealthy habits like smoking and excessive alcohol intake.      Visit today included increased complexity associated with the care of the episodic problems listed above addressed and managing the longitudinal care of the patient due to the serious and/or complex managed problem(s) listed above.

## 2024-12-27 ENCOUNTER — TELEPHONE (OUTPATIENT)
Dept: PHARMACY | Facility: CLINIC | Age: 74
End: 2024-12-27
Payer: MEDICARE

## 2024-12-27 NOTE — TELEPHONE ENCOUNTER
[facility]/Population Health Chart Review & Patient Outreach Details For Medication Adherence Project    Reason for Outreach Encounter: 3rd Party payor non-compliance report ([employer], [employer], [employer], etc)  2.  Patient Outreach Method: Reviewed patient chart  and [employer] message  3.   Medication in question:    Hyperlipidemia Medications               rosuvastatin (CRESTOR) 10 MG tablet Take 1 tablet (10 mg total) by mouth once daily.                  Rosuvastatin  last filled  7/18/24 for 90 day supply    4.  Reviewed and or Updates Made To: Patient Chart  5. Outreach Outcomes and/or actions taken: Sent inquiry to patient: Waiting for response  Additional Notes:

## 2025-01-30 DIAGNOSIS — Z00.00 ENCOUNTER FOR MEDICARE ANNUAL WELLNESS EXAM: ICD-10-CM

## 2025-02-17 ENCOUNTER — PATIENT OUTREACH (OUTPATIENT)
Dept: ADMINISTRATIVE | Facility: HOSPITAL | Age: 75
End: 2025-02-17
Payer: MEDICARE

## 2025-02-17 DIAGNOSIS — E78.5 HYPERLIPIDEMIA LDL GOAL <100: ICD-10-CM

## 2025-02-17 RX ORDER — ROSUVASTATIN CALCIUM 10 MG/1
10 TABLET, COATED ORAL DAILY
Qty: 90 TABLET | Refills: 3 | Status: SHIPPED | OUTPATIENT
Start: 2025-02-17 | End: 2026-02-17

## 2025-02-17 NOTE — PROGRESS NOTES
Pts order for statin therapy has .     REFILL ENCOUNTER SENT TO PCP     Statin Use in Persons with Diabetes -Patients aged 40-75 years with a diagnosis of diabetes: Type 1 or Type 2.  Needs any Intensity Statin during the calendar year    Moderate-Intensity Statin Therapy  Atorvastatin 10-20 mg  Pitavastatin 1-4 mg  Simvastatin 20-40 mg  Rosuvastatin 5-10 mg  Pravastatin 40-80 mg  Lovastatin 40 mg  Fluvastatin 40 mg  Fluvastatin XL 80 mg    High-intensity Statin Therapy  Atorvastatin 40-80 mg  Rosuvastatin 20-40 mg  Simvastatin 80 mg    Exclusions: Intolerance needs a  code (myalgia, myositis, myopathy, or rhabdomyolysis) during the current measurement year AND problem list updated and reviewed IF APPLIES.   G72.0  Drug-induced myopathy or rhabdomyolysis   G72.9  Myopathy, unspecified  M60.9  Myositis, unspecified  M79.10 Myalgia, unspecified site

## 2025-04-01 DIAGNOSIS — E11.9 CONTROLLED TYPE 2 DIABETES MELLITUS WITHOUT COMPLICATION, WITHOUT LONG-TERM CURRENT USE OF INSULIN: ICD-10-CM

## 2025-04-01 RX ORDER — METFORMIN HYDROCHLORIDE 500 MG/1
500 TABLET ORAL 2 TIMES DAILY WITH MEALS
Qty: 180 TABLET | Refills: 0 | Status: SHIPPED | OUTPATIENT
Start: 2025-04-01

## 2025-04-01 NOTE — TELEPHONE ENCOUNTER
Refill Decision Note   Eulalia Villarreal  is requesting a refill authorization.    Brief Assessment and Rationale for Refill:  Approve       Medication Therapy Plan:  FLOS      Comments:     Note composed:3:02 PM 04/01/2025

## 2025-04-01 NOTE — TELEPHONE ENCOUNTER
Care Due:                  Date            Visit Type   Department     Provider  --------------------------------------------------------------------------------                                EP [facility]  Last Visit: 10-      CARE ([facility])   MEDICINE       EDWIN BO                              EP -                              PRIMARY      [facility]  Next Visit: 04-      CARE ([facility])   MEDICINE       EDWIN BO                                                            Last  Test          Frequency    Reason                     Performed    Due Date  --------------------------------------------------------------------------------    HBA1C.......  6 months...  empagliflozin, metFORMIN.  10-   04-    Lipid Panel.  12 months..  rosuvastatin.............  01- 01-    [facility] Embedded Care Due Messages. Reference number: [id number].   4/01/2025 12:16:23 AM CDT

## 2025-04-15 DIAGNOSIS — K21.00 GASTROESOPHAGEAL REFLUX DISEASE WITH ESOPHAGITIS WITHOUT HEMORRHAGE: ICD-10-CM

## 2025-04-15 RX ORDER — PANTOPRAZOLE SODIUM 40 MG/1
40 TABLET, DELAYED RELEASE ORAL
Qty: 90 TABLET | Refills: 1 | Status: SHIPPED | OUTPATIENT
Start: 2025-04-15

## 2025-04-15 NOTE — TELEPHONE ENCOUNTER
Refill Decision Note   Eulalia Villarreal  is requesting a refill authorization.  Brief Assessment and Rationale for Refill:  Approve     Medication Therapy Plan:         Comments:     Note composed:6:09 AM 04/15/2025

## 2025-04-15 NOTE — TELEPHONE ENCOUNTER
No care due was identified.  [facility] Embedded Care Due Messages. Reference number: [id number].   4/15/2025 12:17:43 AM CDT

## 2025-04-17 ENCOUNTER — LAB VISIT (OUTPATIENT)
Dept: LAB | Facility: HOSPITAL | Age: 75
End: 2025-04-17
Attending: FAMILY MEDICINE
Payer: MEDICARE

## 2025-04-17 ENCOUNTER — TELEPHONE (OUTPATIENT)
Dept: PHARMACY | Facility: CLINIC | Age: 75
End: 2025-04-17
Payer: MEDICARE

## 2025-04-17 DIAGNOSIS — E11.9 CONTROLLED TYPE 2 DIABETES MELLITUS WITHOUT COMPLICATION, WITHOUT LONG-TERM CURRENT USE OF INSULIN: ICD-10-CM

## 2025-04-17 LAB
ALBUMIN SERPL BCP-MCNC: 3.8 G/DL (ref 3.5–5.2)
ALP SERPL-CCNC: 94 UNIT/L (ref 40–150)
ALT SERPL W/O P-5'-P-CCNC: 15 UNIT/L (ref 10–44)
ANION GAP (OHS): 6 MMOL/L (ref 8–16)
AST SERPL-CCNC: 17 UNIT/L (ref 11–45)
BILIRUB SERPL-MCNC: 1.1 MG/DL (ref 0.1–1)
BUN SERPL-MCNC: 18 MG/DL (ref 8–23)
CALCIUM SERPL-MCNC: 9.2 MG/DL (ref 8.7–10.5)
CHLORIDE SERPL-SCNC: 108 MMOL/L (ref 95–110)
CHOLEST SERPL-MCNC: 175 MG/DL (ref 120–199)
CHOLEST/HDLC SERPL: 3.2 {RATIO} (ref 2–5)
CO2 SERPL-SCNC: 29 MMOL/L (ref 23–29)
CREAT SERPL-MCNC: 0.8 MG/DL (ref 0.5–1.4)
EAG (OHS): 148 MG/DL (ref 68–131)
GFR SERPLBLD CREATININE-BSD FMLA CKD-EPI: >60 ML/MIN/1.73/M2
GLUCOSE SERPL-MCNC: 128 MG/DL (ref 70–110)
HBA1C MFR BLD: 6.8 % (ref 4–5.6)
HDLC SERPL-MCNC: 55 MG/DL (ref 40–75)
HDLC SERPL: 31.4 % (ref 20–50)
LDLC SERPL CALC-MCNC: 92.4 MG/DL (ref 63–159)
NONHDLC SERPL-MCNC: 120 MG/DL
POTASSIUM SERPL-SCNC: 5.3 MMOL/L (ref 3.5–5.1)
PROT SERPL-MCNC: 7.1 GM/DL (ref 6–8.4)
SODIUM SERPL-SCNC: 143 MMOL/L (ref 136–145)
TRIGL SERPL-MCNC: 138 MG/DL (ref 30–150)

## 2025-04-17 PROCEDURE — 84075 ASSAY ALKALINE PHOSPHATASE: CPT

## 2025-04-17 PROCEDURE — 80061 LIPID PANEL: CPT

## 2025-04-17 PROCEDURE — 83036 HEMOGLOBIN GLYCOSYLATED A1C: CPT

## 2025-04-17 PROCEDURE — 36415 COLL VENOUS BLD VENIPUNCTURE: CPT | Mod: PO

## 2025-04-17 NOTE — TELEPHONE ENCOUNTER
[facility]/Population Health Chart Review & Patient Outreach Details For Medication Adherence Project    Reason for Outreach Encounter: 3rd Party payor non-compliance report ([employer], [employer], [employer], etc)  2.  Patient Outreach Method: Reviewed patient chart   3.   Medication in question:    Diabetes Medications              empagliflozin (JARDIANCE) 25 mg tablet Take 1 tablet (25 mg total) by mouth once daily.    metFORMIN (GLUCOPHAGE) 500 MG tablet TAKE 1 TABLET BY MOUTH TWICE A DAY WITH MEALS                 LF 90 ds 4/16/25  Metformin LF 90 ds 4/1/25    4.  Reviewed and or Updates Made To: Patient Chart  5. Outreach Outcomes and/or actions taken: Patient filled medication and is on track to be adherent  Additional Notes:

## 2025-04-29 ENCOUNTER — OFFICE VISIT (OUTPATIENT)
Dept: FAMILY MEDICINE | Facility: CLINIC | Age: 75
End: 2025-04-29
Payer: MEDICARE

## 2025-04-29 ENCOUNTER — TELEPHONE (OUTPATIENT)
Dept: FAMILY MEDICINE | Facility: CLINIC | Age: 75
End: 2025-04-29
Payer: MEDICARE

## 2025-04-29 VITALS
BODY MASS INDEX: 23.75 KG/M2 | HEIGHT: 64 IN | OXYGEN SATURATION: 97 % | DIASTOLIC BLOOD PRESSURE: 64 MMHG | HEART RATE: 72 BPM | WEIGHT: 139.13 LBS | SYSTOLIC BLOOD PRESSURE: 128 MMHG

## 2025-04-29 DIAGNOSIS — G47.30 SLEEP APNEA, UNSPECIFIED TYPE: ICD-10-CM

## 2025-04-29 DIAGNOSIS — K21.00 GASTROESOPHAGEAL REFLUX DISEASE WITH ESOPHAGITIS WITHOUT HEMORRHAGE: ICD-10-CM

## 2025-04-29 DIAGNOSIS — E11.9 CONTROLLED TYPE 2 DIABETES MELLITUS WITHOUT COMPLICATION, WITHOUT LONG-TERM CURRENT USE OF INSULIN: ICD-10-CM

## 2025-04-29 DIAGNOSIS — E78.5 HYPERLIPIDEMIA LDL GOAL <100: ICD-10-CM

## 2025-04-29 DIAGNOSIS — E11.9 CONTROLLED TYPE 2 DIABETES MELLITUS WITHOUT COMPLICATION, WITHOUT LONG-TERM CURRENT USE OF INSULIN: Primary | ICD-10-CM

## 2025-04-29 PROCEDURE — 1160F RVW MEDS BY RX/DR IN RCRD: CPT | Mod: CPTII,S$GLB,, | Performed by: FAMILY MEDICINE

## 2025-04-29 PROCEDURE — 3066F NEPHROPATHY DOC TX: CPT | Mod: CPTII,S$GLB,, | Performed by: FAMILY MEDICINE

## 2025-04-29 PROCEDURE — 3072F LOW RISK FOR RETINOPATHY: CPT | Mod: CPTII,S$GLB,, | Performed by: FAMILY MEDICINE

## 2025-04-29 PROCEDURE — 3044F HG A1C LEVEL LT 7.0%: CPT | Mod: CPTII,S$GLB,, | Performed by: FAMILY MEDICINE

## 2025-04-29 PROCEDURE — 99214 OFFICE O/P EST MOD 30 MIN: CPT | Mod: S$GLB,,, | Performed by: FAMILY MEDICINE

## 2025-04-29 PROCEDURE — 3061F NEG MICROALBUMINURIA REV: CPT | Mod: CPTII,S$GLB,, | Performed by: FAMILY MEDICINE

## 2025-04-29 PROCEDURE — G2211 COMPLEX E/M VISIT ADD ON: HCPCS | Mod: S$GLB,,, | Performed by: FAMILY MEDICINE

## 2025-04-29 PROCEDURE — 3008F BODY MASS INDEX DOCD: CPT | Mod: CPTII,S$GLB,, | Performed by: FAMILY MEDICINE

## 2025-04-29 PROCEDURE — 1101F PT FALLS ASSESS-DOCD LE1/YR: CPT | Mod: CPTII,S$GLB,, | Performed by: FAMILY MEDICINE

## 2025-04-29 PROCEDURE — 1159F MED LIST DOCD IN RCRD: CPT | Mod: CPTII,S$GLB,, | Performed by: FAMILY MEDICINE

## 2025-04-29 PROCEDURE — 3078F DIAST BP <80 MM HG: CPT | Mod: CPTII,S$GLB,, | Performed by: FAMILY MEDICINE

## 2025-04-29 PROCEDURE — 3074F SYST BP LT 130 MM HG: CPT | Mod: CPTII,S$GLB,, | Performed by: FAMILY MEDICINE

## 2025-04-29 PROCEDURE — 99999 PR PBB SHADOW E&M-EST. PATIENT-LVL III: CPT | Mod: PBBFAC,,, | Performed by: FAMILY MEDICINE

## 2025-04-29 PROCEDURE — 3288F FALL RISK ASSESSMENT DOCD: CPT | Mod: CPTII,S$GLB,, | Performed by: FAMILY MEDICINE

## 2025-04-29 PROCEDURE — 1126F AMNT PAIN NOTED NONE PRSNT: CPT | Mod: CPTII,S$GLB,, | Performed by: FAMILY MEDICINE

## 2025-04-29 RX ORDER — METFORMIN HYDROCHLORIDE 500 MG/1
500 TABLET ORAL 2 TIMES DAILY WITH MEALS
Qty: 180 TABLET | Refills: 3 | Status: SHIPPED | OUTPATIENT
Start: 2025-04-29

## 2025-04-29 RX ORDER — PANTOPRAZOLE SODIUM 40 MG/1
40 TABLET, DELAYED RELEASE ORAL DAILY
Qty: 90 TABLET | Refills: 3 | Status: SHIPPED | OUTPATIENT
Start: 2025-04-29

## 2025-04-29 RX ORDER — ROSUVASTATIN CALCIUM 10 MG/1
10 TABLET, COATED ORAL DAILY
Qty: 90 TABLET | Refills: 3 | Status: SHIPPED | OUTPATIENT
Start: 2025-04-29 | End: 2026-04-29

## 2025-04-29 NOTE — PROGRESS NOTES
Subjective:       Patient ID: Eulalia Villarreal is a 74 y.o. female.    Chief Complaint: Diabetes (6 month follow up)      Patient presents with:  Diabetes: 6 month follow up      DM2 - taking metformin 500mg BID, jardiance 25mg  HLD - taking Crestor 10mg daily with CoQ 10  Daily HA - taking tylenol PRN; using voltaren topical at bedtime  GERD - controlled protonix  Sleep apnea - wearing CPAP nightly; awakening in the middle of the night at times; some daytime fatigue. She has a follow-up upcoming.  Heand arthritis - some chronic thumb pain with activity  saw podiatry and was diagnosed with neuropathy. Taking B12, D and ALA          Diabetes  Hypoglycemia symptoms include headaches (posterior neck with radiation into the scalp.). Pertinent negatives for hypoglycemia include no dizziness. Pertinent negatives for diabetes include no chest pain and no weakness.   Follow-up  Associated symptoms include arthralgias (hands), headaches (posterior neck with radiation into the scalp.) and numbness. Pertinent negatives include no abdominal pain, chest pain, chills, coughing, fever, nausea, neck pain, rash, sore throat, vomiting or weakness.   Headache   Associated symptoms include numbness. Pertinent negatives include no abdominal pain, coughing, dizziness, eye pain, fever, nausea, neck pain, sore throat, vomiting or weakness.   Hip Pain   Associated symptoms include numbness.       Past Medical History:   Diagnosis Date    Arthritis     hands    Diabetes mellitus     Herniated disc     L5    Hyperlipidemia     Sleep apnea, unspecified        Past Surgical History:   Procedure Laterality Date    ADENOIDECTOMY      BACK SURGERY  1982    discectomy    FLEXIBLE SIGMOIDOSCOPY  8/7/1990  [facility]    TONSILLECTOMY      WISDOM TOOTH EXTRACTION         Review of patient's allergies indicates:   Allergen Reactions    Statins-hmg-coa reductase inhibitors Other (See Comments)     Severe pain to her left side    Meperidine      Other  reaction(s): hyper    Sulfa (sulfonamide antibiotics) Rash     Other reaction(s): Itching       Social History     Socioeconomic History    Marital status:    Occupational History    Occupation: retired    Tobacco Use    Smoking status: Never    Smokeless tobacco: Never   Substance and Sexual Activity    Alcohol use: No     Comment: rare    Drug use: No    Sexual activity: Never     Social Drivers of Health     Financial Resource Strain: Low Risk  (1/11/2021)    Overall Financial Resource Strain (CARDIA)     Difficulty of Paying Living Expenses: Not very hard   Food Insecurity: No Food Insecurity (1/11/2021)    Hunger Vital Sign     Worried About Running Out of Food in the Last Year: Never true     Ran Out of Food in the Last Year: Never true   Transportation Needs: No Transportation Needs (1/11/2021)    PRAPARE - Transportation     Lack of Transportation (Medical): No     Lack of Transportation (Non-Medical): No   Physical Activity: Inactive (1/11/2021)    Exercise Vital Sign     Days of Exercise per Week: 0 days     Minutes of Exercise per Session: 0 min   Stress: Stress Concern Present (1/11/2021)    [employer] - Occupational Stress Questionnaire     Feeling of Stress : To some extent       Current Outpatient Medications on File Prior to Visit   Medication Sig Dispense Refill    ascorbic acid, vitamin C, (VITAMIN C) 500 MG tablet Take 1 tablet by mouth Daily.      diclofenac sodium (VOLTAREN) 1 % Gel Apply 2 g topically 4 (four) times daily. To hands as needed 100 g 1    fluocinonide 0.05% (LIDEX) 0.05 % cream Apply topically 2 (two) times daily. 60 g 0    glucosamine-D3-Boswellia serr 1,500-400-100 mg-unit-mg Tab Take by mouth.      ketoconazole (NIZORAL) 2 % shampoo Apply topically twice a week. 120 mL 4    tiZANidine (ZANAFLEX) 2 MG tablet Take 2 tablets (4 mg total) by mouth nightly as needed (headache). 30 tablet 5    UBIDECARENONE (CO Q-10 ORAL) Take by  mouth.      vitamin D 185 MG Tab Take 2,000 mg by mouth once daily.      VITAMIN E ACETATE (VITAMIN E ORAL) Take 1 capsule by mouth Daily.      [DISCONTINUED] empagliflozin (JARDIANCE) 25 mg tablet Take 1 tablet (25 mg total) by mouth once daily. 90 tablet 3    [DISCONTINUED] metFORMIN (GLUCOPHAGE) 500 MG tablet TAKE 1 TABLET BY MOUTH TWICE A DAY WITH MEALS 180 tablet 0    [DISCONTINUED] pantoprazole (PROTONIX) 40 MG tablet TAKE 1 TABLET BY MOUTH EVERY DAY 90 tablet 1    [DISCONTINUED] rosuvastatin (CRESTOR) 10 MG tablet Take 1 tablet (10 mg total) by mouth once daily. 90 tablet 3    FLUAD QUAD 2021-22,65Y UP,,PF, 60 mcg (15 mcg x 4)/0.5 mL Syrg  (Patient not taking: Reported on 7/24/2024)      FLUAD QUAD 2022-23,65Y UP,,PF, 60 mcg (15 mcg x 4)/0.5 mL Syrg  (Patient not taking: Reported on 7/24/2024)      fluocinonide (LIDEX) 0.05 % external solution Apply topically 2 (two) times daily. (Patient not taking: Reported on 7/24/2024) 60 mL 2     No current facility-administered medications on file prior to visit.       Family History   Problem Relation Name Age of Onset    Diabetes Mother      Coronary artery disease Mother      Diabetes Son      Cancer Maternal Aunt      Cerebral aneurysm Father      Coronary artery disease Sister      Coronary artery disease Brother      Heart disease Neg Hx      Glaucoma Neg Hx      Retinal detachment Neg Hx      Macular degeneration Neg Hx      Melanoma Neg Hx         Review of Systems   Constitutional:  Negative for appetite change, chills, fever and unexpected weight change.   HENT:  Negative for sore throat and trouble swallowing.    Eyes:  Negative for pain and visual disturbance.   Respiratory:  Negative for cough, shortness of breath and wheezing.    Cardiovascular:  Negative for chest pain and palpitations.   Gastrointestinal:  Negative for abdominal pain, blood in stool, diarrhea, nausea and vomiting.   Genitourinary:  Negative for difficulty urinating, dysuria and  "hematuria.   Musculoskeletal:  Positive for arthralgias (hands). Negative for gait problem and neck pain.   Skin:  Negative for rash and wound.   Neurological:  Positive for numbness and headaches (posterior neck with radiation into the scalp.). Negative for dizziness and weakness.   Hematological:  Negative for adenopathy.   Psychiatric/Behavioral:  Negative for dysphoric mood.        Objective:      /64   Pulse 72   Ht 5' 4" (1.626 m)   Wt 63.1 kg (139 lb 1.8 oz)   SpO2 97%   BMI 23.88 kg/m²   Physical Exam  Constitutional:       Appearance: Normal appearance. She is well-developed.   HENT:      Head: Normocephalic.      Mouth/Throat:      Pharynx: No oropharyngeal exudate or posterior oropharyngeal erythema.   Eyes:      Conjunctiva/sclera: Conjunctivae normal.      Pupils: Pupils are equal, round, and reactive to light.   Neck:      Thyroid: No thyromegaly.   Cardiovascular:      Rate and Rhythm: Normal rate and regular rhythm.      Heart sounds: Normal heart sounds, S1 normal and S2 normal. No murmur heard.     No friction rub. No gallop.   Pulmonary:      Effort: Pulmonary effort is normal.      Breath sounds: Normal breath sounds. No wheezing or rales.   Abdominal:      General: Bowel sounds are normal.      Palpations: Abdomen is soft.   Musculoskeletal:      Cervical back: Normal range of motion and neck supple.      Lumbar back: Normal.      Right hip: Tenderness present. Decreased range of motion.      Left hip: Tenderness present. Decreased range of motion.      Right lower leg: No edema.      Left lower leg: No edema.   Lymphadenopathy:      Cervical: No cervical adenopathy.   Skin:     General: Skin is warm.      Findings: No rash.   Neurological:      Mental Status: She is alert and oriented to person, place, and time.      Cranial Nerves: No cranial nerve deficit.      Gait: Gait normal.       Foot exam: inspection normal, sensation intact; 2+ DP pulses bilaterally    Results for orders " placed or performed in visit on 04/17/25   Microalbumin/Creatinine Ratio, Urine    Collection Time: 04/17/25  7:46 AM   Result Value Ref Range    Urine Microalbumin 6.0   ug/mL    Urine Creatinine 56.0 15.0 - 325.0 mg/dL    Microalbumin/Creatinine Ratio Urine 10.7 <=30.0 ug/mg     Labs 4/17/25 reviewed    Assessment:       1. Controlled type 2 diabetes mellitus without complication, without long-term current use of insulin    2. Hyperlipidemia LDL goal <100    3. Gastroesophageal reflux disease with esophagitis without hemorrhage    4. Sleep apnea, unspecified type                    Plan:       Controlled type 2 diabetes mellitus without complication, without long-term current use of insulin  -     Comprehensive Metabolic Panel; Future; Expected date: 10/29/2025  -     Hemoglobin A1C; Future; Expected date: 10/29/2025  -     Discontinue: empagliflozin (JARDIANCE) 25 mg tablet; Take 1 tablet (25 mg total) by mouth once daily.  Dispense: 90 tablet; Refill: 3  -     metFORMIN (GLUCOPHAGE) 500 MG tablet; Take 1 tablet (500 mg total) by mouth 2 (two) times daily with meals.  Dispense: 180 tablet; Refill: 3    Hyperlipidemia LDL goal <100  -     rosuvastatin (CRESTOR) 10 MG tablet; Take 1 tablet (10 mg total) by mouth once daily.  Dispense: 90 tablet; Refill: 3    Gastroesophageal reflux disease with esophagitis without hemorrhage  -     pantoprazole (PROTONIX) 40 MG tablet; Take 1 tablet (40 mg total) by mouth once daily.  Dispense: 90 tablet; Refill: 3    Sleep apnea, unspecified type            Continue Protonix   Continue Jardiance 25mg; continue Metformin  continue other present meds  Continue CPAP and benefiting from use; f/u with sleep medicine as planned  F/u 6 months with labs  Counseled on regular exercise, maintenance of a healthy weight, balanced diet rich in fruits/vegetables and lean protein, and avoidance of unhealthy habits like smoking and excessive alcohol intake.      Visit today included increased  complexity associated with the care of the episodic problems listed above addressed and managing the longitudinal care of the patient due to the serious and/or complex managed problem(s) listed above.

## 2025-04-29 NOTE — TELEPHONE ENCOUNTER
Patient stated that she went to the pharmacy to  her empagliflozin (JARDIANCE) 25 mg tablet, but the pharmacy would left her pick it up because it shows discontinues. Patient is needing a refill sent to pharmacy just this one time.

## 2025-05-22 DIAGNOSIS — E11.9 CONTROLLED TYPE 2 DIABETES MELLITUS WITHOUT COMPLICATION, WITHOUT LONG-TERM CURRENT USE OF INSULIN: ICD-10-CM

## 2025-05-22 NOTE — TELEPHONE ENCOUNTER
----- Message from Shonna sent at 5/22/2025  9:53 AM CDT -----  Contact: pt  Type:  Needs Medical AdviceWho Called: Eulalia Mercado name and phone #:  Judy (Home Delivery) [facility] [address] [address]Ekbvc: [phone] Fax: [phone] the patient rather a call back or a response via [facility]? Call [facility] Call Back Number: [phone] Information: would like switch jardiance to above pharm, also has questionPlease call and adviseThanks

## 2025-05-22 NOTE — TELEPHONE ENCOUNTER
Patient is requesting a 60 day supply sent to [facility].  In June she would like a 90 day supply.  She is trying to get all her medication shipped to her at the same time.

## 2025-05-22 NOTE — TELEPHONE ENCOUNTER
No care due was identified.  [facility] Embedded Care Due Messages. Reference number: [id number].   5/22/2025 11:20:12 AM CDT

## 2025-05-28 ENCOUNTER — TELEPHONE (OUTPATIENT)
Dept: PHARMACY | Facility: CLINIC | Age: 75
End: 2025-05-28
Payer: MEDICARE

## 2025-05-28 NOTE — TELEPHONE ENCOUNTER
[facility]/Population Health Chart Review & Patient Outreach Details For Medication Adherence Project    Reason for Outreach Encounter: 3rd Party payor non-compliance report ([employer], [employer], [employer], etc)  2.  Patient Outreach Method: Reviewed patient chart   3.   Medication in question:    Diabetes Medications              empagliflozin (JARDIANCE) 25 mg tablet Take 1 tablet (25 mg total) by mouth once daily.    metFORMIN (GLUCOPHAGE) 500 MG tablet Take 1 tablet (500 mg total) by mouth 2 (two) times daily with meals.              LF 60 ds 5/23/25 - Jardiance    4.  Reviewed and or Updates Made To: Patient Chart  5. Outreach Outcomes and/or actions taken: Patient filled medication and is on track to be adherent  Additional Notes:

## 2025-06-18 ENCOUNTER — OFFICE VISIT (OUTPATIENT)
Dept: OPTOMETRY | Facility: CLINIC | Age: 75
End: 2025-06-18
Payer: MEDICARE

## 2025-06-18 DIAGNOSIS — H25.13 NUCLEAR SCLEROSIS OF BOTH EYES: ICD-10-CM

## 2025-06-18 DIAGNOSIS — H52.7 REFRACTIVE ERROR: ICD-10-CM

## 2025-06-18 DIAGNOSIS — E11.9 TYPE 2 DIABETES MELLITUS WITHOUT RETINOPATHY: Primary | ICD-10-CM

## 2025-06-18 PROCEDURE — 3066F NEPHROPATHY DOC TX: CPT | Mod: CPTII,S$GLB,, | Performed by: OPTOMETRIST

## 2025-06-18 PROCEDURE — 1159F MED LIST DOCD IN RCRD: CPT | Mod: CPTII,S$GLB,, | Performed by: OPTOMETRIST

## 2025-06-18 PROCEDURE — 3061F NEG MICROALBUMINURIA REV: CPT | Mod: CPTII,S$GLB,, | Performed by: OPTOMETRIST

## 2025-06-18 PROCEDURE — 3044F HG A1C LEVEL LT 7.0%: CPT | Mod: CPTII,S$GLB,, | Performed by: OPTOMETRIST

## 2025-06-18 PROCEDURE — 3288F FALL RISK ASSESSMENT DOCD: CPT | Mod: CPTII,S$GLB,, | Performed by: OPTOMETRIST

## 2025-06-18 PROCEDURE — 92014 COMPRE OPH EXAM EST PT 1/>: CPT | Mod: S$GLB,,, | Performed by: OPTOMETRIST

## 2025-06-18 PROCEDURE — 92015 DETERMINE REFRACTIVE STATE: CPT | Mod: S$GLB,,, | Performed by: OPTOMETRIST

## 2025-06-18 PROCEDURE — 1160F RVW MEDS BY RX/DR IN RCRD: CPT | Mod: CPTII,S$GLB,, | Performed by: OPTOMETRIST

## 2025-06-18 PROCEDURE — 1126F AMNT PAIN NOTED NONE PRSNT: CPT | Mod: CPTII,S$GLB,, | Performed by: OPTOMETRIST

## 2025-06-18 PROCEDURE — 1101F PT FALLS ASSESS-DOCD LE1/YR: CPT | Mod: CPTII,S$GLB,, | Performed by: OPTOMETRIST

## 2025-06-18 PROCEDURE — 99999 PR PBB SHADOW E&M-EST. PATIENT-LVL III: CPT | Mod: PBBFAC,,, | Performed by: OPTOMETRIST

## 2025-06-18 NOTE — PROGRESS NOTES
HPI    Pt here for annual DM eye exam DLS- 06/05/24    Pt complains of blurry vision. Can see better to read without specs.   DM and BP are both stable on meds.   Has floaters. Denies FOL.   Using OTC GTTS PRN with relief.     Hemoglobin A1C       Date                     Value               Ref Range             Status                10/17/2024               6.6 (H)             4.0 - 5.6 %           Final              Comment:    ADA Screening Guidelines:  5.7-6.4%  Consistent with   prediabetes  >or=6.5%  Consistent with diabetes    High levels of fetal   hemoglobin interfere with the HbA1C  assay. Heterozygous hemoglobin   variants (HbS, HgC, etc)do  not significantly interfere with this assay.     However, presence of multiple variants may affect accuracy.         07/17/2024               7.1 (H)             4.0 - 5.6 %           Final              Comment:    ADA Screening Guidelines:  5.7-6.4%  Consistent with   prediabetes  >or=6.5%  Consistent with diabetes    High levels of fetal   hemoglobin interfere with the HbA1C  assay. Heterozygous hemoglobin   variants (HbS, HgC, etc)do  not significantly interfere with this assay.     However, presence of multiple variants may affect accuracy.         01/17/2024               6.7 (H)             4.0 - 5.6 %           Final              Comment:    ADA Screening Guidelines:  5.7-6.4%  Consistent with   prediabetes  >or=6.5%  Consistent with diabetes    High levels of fetal   hemoglobin interfere with the HbA1C  assay. Heterozygous hemoglobin   variants (HbS, HgC, etc)do  not significantly interfere with this assay.     However, presence of multiple variants may affect accuracy.    ----------  Hemoglobin A1c       Date                     Value               Ref Range             Status                04/17/2025               6.8 (H)             4.0 - 5.6 %           Final              Comment:    ADA Screening Guidelines:  5.7-6.4%  Consistent with  prediabetes  >=6.5%    Consistent with diabetes    High levels of fetal hemoglobin interfere   with the HbA1C  assay. Heterozygous hemoglobin variants (HbS, HgC,   etc)do  not significantly interfere with this assay.   However, presence   of multiple variants may affect accuracy.  ----------    Last edited by Naomy Wilkinson on 6/18/2025  9:43 AM.            Assessment /Plan     For exam results, see Encounter Report.    Type 2 diabetes mellitus without retinopathy    Nuclear sclerosis of both eyes    Refractive error      No diabetic retinopathy, no csme. Return in 1 year for dilated eye exam.  2. Educated pt on presence of cataracts and effects on vision. No surgery at this time. Recheck in one year.  3. New Spectacle Rx given, discussed different options for glasses. RTC 1 year routine eye exam.

## 2025-06-27 ENCOUNTER — TELEPHONE (OUTPATIENT)
Dept: FAMILY MEDICINE | Facility: CLINIC | Age: 75
End: 2025-06-27
Payer: MEDICARE

## 2025-06-27 NOTE — TELEPHONE ENCOUNTER
Copied from CRM #[id number]. Topic: Appointments - Same Day Access  >> Jun 27, 2025  9:29 AM Анна wrote:  Type:  Same Day Appointment Request    Caller is requesting a same day appointment.  Caller declined first available appointment listed below.    Name of Caller:PT   When is the first available appointment?PT [facility] FOR 06/30/25  Symptoms:SORE THROAT   Best Call Back Number: [phone]  Additional Information: PT IS OPEN TO SEEING ANY AVAILABLE PROVIDER   THANK YOU

## 2025-07-02 DIAGNOSIS — Z78.0 MENOPAUSE: ICD-10-CM

## 2025-08-26 ENCOUNTER — HOSPITAL ENCOUNTER (OUTPATIENT)
Dept: RADIOLOGY | Facility: HOSPITAL | Age: 75
Discharge: HOME | End: 2025-08-26
Attending: FAMILY MEDICINE
Payer: MEDICARE

## 2025-08-26 DIAGNOSIS — Z12.31 ENCOUNTER FOR SCREENING MAMMOGRAM FOR BREAST CANCER: ICD-10-CM

## 2025-08-26 PROCEDURE — 77067 SCR MAMMO BI INCL CAD: CPT | Mod: 26,,, | Performed by: RADIOLOGY

## 2025-08-26 PROCEDURE — 77063 BREAST TOMOSYNTHESIS BI: CPT | Mod: TC,PO

## 2025-08-26 PROCEDURE — 77063 BREAST TOMOSYNTHESIS BI: CPT | Mod: 26,,, | Performed by: RADIOLOGY
